# Patient Record
Sex: MALE | Race: WHITE | NOT HISPANIC OR LATINO | Employment: OTHER | URBAN - METROPOLITAN AREA
[De-identification: names, ages, dates, MRNs, and addresses within clinical notes are randomized per-mention and may not be internally consistent; named-entity substitution may affect disease eponyms.]

---

## 2021-10-16 ENCOUNTER — OFFICE VISIT (OUTPATIENT)
Dept: URGENT CARE | Facility: URGENT CARE | Age: 31
End: 2021-10-16
Payer: COMMERCIAL

## 2021-10-16 VITALS
TEMPERATURE: 97.9 F | WEIGHT: 315 LBS | OXYGEN SATURATION: 96 % | DIASTOLIC BLOOD PRESSURE: 94 MMHG | RESPIRATION RATE: 20 BRPM | SYSTOLIC BLOOD PRESSURE: 154 MMHG | HEART RATE: 84 BPM

## 2021-10-16 DIAGNOSIS — L02.211 ABDOMINAL WALL ABSCESS: ICD-10-CM

## 2021-10-16 DIAGNOSIS — L03.311 ABDOMINAL WALL CELLULITIS: Primary | ICD-10-CM

## 2021-10-16 PROCEDURE — 99203 OFFICE O/P NEW LOW 30 MIN: CPT | Performed by: NURSE PRACTITIONER

## 2021-10-16 RX ORDER — HYDROXYZINE HYDROCHLORIDE 25 MG/1
TABLET, FILM COATED ORAL
COMMUNITY
Start: 2019-12-04 | End: 2023-07-20

## 2021-10-16 RX ORDER — GABAPENTIN 300 MG/1
600 CAPSULE ORAL
COMMUNITY
Start: 2021-08-18 | End: 2023-07-13

## 2021-10-16 RX ORDER — SULFAMETHOXAZOLE/TRIMETHOPRIM 800-160 MG
1 TABLET ORAL 2 TIMES DAILY
Qty: 14 TABLET | Refills: 0 | Status: SHIPPED | OUTPATIENT
Start: 2021-10-16 | End: 2021-10-23

## 2021-10-16 NOTE — PROGRESS NOTES
Chief Complaint   Patient presents with     Urgent Care     cyst on his abdominal          ICD-10-CM    1. Abdominal wall cellulitis  L03.311 sulfamethoxazole-trimethoprim (BACTRIM DS) 800-160 MG tablet   2. Abdominal wall abscess  L02.211    Warm wet compresses, antibiotics as prescribed.  Not able to drain at this point but if symptoms worsen patient will return for possible drainage in the future.    Subjective     Troy Mccoy is an 31 year old male who presents to clinic today for abdominal cysts.  He has a history of developing cysts and abscesses that need to be drained.  The 2 he has now are on his waistline and lower abdomen, no drainage, present for about a week.    ROS: 10 point ROS neg other than the symptoms noted above in the HPI.       Objective    BP (!) 154/94   Pulse 84   Temp 97.9  F (36.6  C)   Resp 20   Wt (!) 152.9 kg (337 lb)   SpO2 96%     Physical Exam       GENERAL APPEARANCE: healthy appearing, alert     ABDOMEN:  soft, nontender, no HSM or masses and bowel sounds normal     MS: extremities normal- no gross deformities noted; normal muscle tone.     SKIN: 1.5 cm diameter area of erythema at the bottom of center abdominal wall with another larger area of erythema 3 x 3 cm underneath the first, no flocculence palpated area is hot to the touch    Patient Instructions   MUST START MEDICATION TODAY!!      Patient Education     Cellulitis  Cellulitis is an infection of the deep layers of skin. A break in the skin, such as a cut or scratch, can let bacteria under the skin. If the bacteria get to deep layers of the skin, it can be serious. If not treated, cellulitis can get into the bloodstream and lymph nodes. The infection can then spread throughout the body. This causes serious illness.   Cellulitis causes the affected skin to become red, swollen, warm, and sore. The reddened areas have a visible border. An open sore may leak fluid (pus). You may have a fever, chills, and pain.    Cellulitis is treated with antibiotics taken for 7 to 10 days. An open sore may be cleaned and covered with cool wet gauze. Symptoms should get better 1 to 2 days after treatment is started. Make sure to take all the antibiotics for the full number of days until they are gone. Keep taking the medicine even if your symptoms go away.   Home care  Follow these tips:    Limit the use of the part of your body with cellulitis.     If the infection is on your leg, keep your leg raised while sitting. This helps reduce swelling.    Take all of the antibiotic medicine exactly as directed until it is gone. Don't miss any doses, especially during the first 7 days. Don t stop taking the medicine when your symptoms get better.    Keep the affected area clean and dry.    Wash your hands with soap and clean, running water before and after touching your skin. Anyone else who touches your skin should also wash his or her hands. Don't share towels.  Follow-up care  Follow up with your healthcare provider, or as advised. If your infection doesn't go away on the first antibiotic, your healthcare provider will prescribe a different one.   When to seek medical advice  Call your healthcare provider right away if any of these occur:    Red areas that spread    Swelling or pain that gets worse    Fluid leaking from the skin (pus)    Fever higher of 100.4  F (38.0  C) or higher after 2 days on antibiotics  [employer] last reviewed this educational content on 8/1/2019 2000-2021 [employer]. All rights reserved. This information is not intended as a substitute for professional medical care. Always follow your healthcare professional's instructions.               ARMANDO Boggs, CNP  [facility] Provider

## 2021-10-16 NOTE — PATIENT INSTRUCTIONS
MUST START MEDICATION TODAY!!      Patient Education     Cellulitis  Cellulitis is an infection of the deep layers of skin. A break in the skin, such as a cut or scratch, can let bacteria under the skin. If the bacteria get to deep layers of the skin, it can be serious. If not treated, cellulitis can get into the bloodstream and lymph nodes. The infection can then spread throughout the body. This causes serious illness.   Cellulitis causes the affected skin to become red, swollen, warm, and sore. The reddened areas have a visible border. An open sore may leak fluid (pus). You may have a fever, chills, and pain.   Cellulitis is treated with antibiotics taken for 7 to 10 days. An open sore may be cleaned and covered with cool wet gauze. Symptoms should get better 1 to 2 days after treatment is started. Make sure to take all the antibiotics for the full number of days until they are gone. Keep taking the medicine even if your symptoms go away.   Home care  Follow these tips:    Limit the use of the part of your body with cellulitis.     If the infection is on your leg, keep your leg raised while sitting. This helps reduce swelling.    Take all of the antibiotic medicine exactly as directed until it is gone. Don't miss any doses, especially during the first 7 days. Don t stop taking the medicine when your symptoms get better.    Keep the affected area clean and dry.    Wash your hands with soap and clean, running water before and after touching your skin. Anyone else who touches your skin should also wash his or her hands. Don't share towels.  Follow-up care  Follow up with your healthcare provider, or as advised. If your infection doesn't go away on the first antibiotic, your healthcare provider will prescribe a different one.   When to seek medical advice  Call your healthcare provider right away if any of these occur:    Red areas that spread    Swelling or pain that gets worse    Fluid leaking from the skin  (pus)    Fever higher of 100.4  F (38.0  C) or higher after 2 days on antibiotics  [employer] last reviewed this educational content on 8/1/2019 2000-2021 [employer]. All rights reserved. This information is not intended as a substitute for professional medical care. Always follow your healthcare professional's instructions.

## 2022-12-22 ENCOUNTER — HOSPITAL ENCOUNTER (EMERGENCY)
Facility: CLINIC | Age: 32
Discharge: HOME | End: 2022-12-22
Attending: EMERGENCY MEDICINE | Admitting: EMERGENCY MEDICINE
Payer: COMMERCIAL

## 2022-12-22 ENCOUNTER — APPOINTMENT (OUTPATIENT)
Dept: CT IMAGING | Facility: CLINIC | Age: 32
End: 2022-12-22
Attending: EMERGENCY MEDICINE
Payer: COMMERCIAL

## 2022-12-22 VITALS
DIASTOLIC BLOOD PRESSURE: 94 MMHG | WEIGHT: 315 LBS | HEART RATE: 83 BPM | HEIGHT: 75 IN | OXYGEN SATURATION: 99 % | BODY MASS INDEX: 39.17 KG/M2 | SYSTOLIC BLOOD PRESSURE: 147 MMHG | RESPIRATION RATE: 18 BRPM | TEMPERATURE: 98.7 F

## 2022-12-22 DIAGNOSIS — Z87.19 HISTORY OF CIRRHOSIS: ICD-10-CM

## 2022-12-22 DIAGNOSIS — R10.31 ABDOMINAL PAIN, RIGHT LOWER QUADRANT: ICD-10-CM

## 2022-12-22 DIAGNOSIS — R19.7 VOMITING AND DIARRHEA: ICD-10-CM

## 2022-12-22 DIAGNOSIS — R11.10 VOMITING AND DIARRHEA: ICD-10-CM

## 2022-12-22 LAB
ALBUMIN SERPL-MCNC: 4.5 G/DL (ref 3.4–5)
ALP SERPL-CCNC: 98 U/L (ref 40–150)
ALT SERPL W P-5'-P-CCNC: 34 U/L (ref 0–70)
ANION GAP SERPL CALCULATED.3IONS-SCNC: 11 MMOL/L (ref 3–14)
AST SERPL W P-5'-P-CCNC: 23 U/L (ref 0–45)
BASOPHILS # BLD AUTO: 0 10E3/UL (ref 0–0.2)
BASOPHILS NFR BLD AUTO: 0 %
BILIRUB SERPL-MCNC: 0.9 MG/DL (ref 0.2–1.3)
BUN SERPL-MCNC: 17 MG/DL (ref 7–30)
CALCIUM SERPL-MCNC: 9.9 MG/DL (ref 8.5–10.1)
CHLORIDE BLD-SCNC: 102 MMOL/L (ref 94–109)
CO2 SERPL-SCNC: 23 MMOL/L (ref 20–32)
CREAT SERPL-MCNC: 0.93 MG/DL (ref 0.66–1.25)
EOSINOPHIL # BLD AUTO: 0 10E3/UL (ref 0–0.7)
EOSINOPHIL NFR BLD AUTO: 0 %
ERYTHROCYTE [DISTWIDTH] IN BLOOD BY AUTOMATED COUNT: 13.2 % (ref 10–15)
GFR SERPL CREATININE-BSD FRML MDRD: >90 ML/MIN/1.73M2
GLUCOSE BLD-MCNC: 96 MG/DL (ref 70–99)
HCT VFR BLD AUTO: 50.3 % (ref 40–53)
HGB BLD-MCNC: 17.8 G/DL (ref 13.3–17.7)
HOLD SPECIMEN: NORMAL
IMM GRANULOCYTES # BLD: 0.1 10E3/UL
IMM GRANULOCYTES NFR BLD: 1 %
LIPASE SERPL-CCNC: 124 U/L (ref 73–393)
LYMPHOCYTES # BLD AUTO: 3.1 10E3/UL (ref 0.8–5.3)
LYMPHOCYTES NFR BLD AUTO: 23 %
MCH RBC QN AUTO: 30.5 PG (ref 26.5–33)
MCHC RBC AUTO-ENTMCNC: 35.4 G/DL (ref 31.5–36.5)
MCV RBC AUTO: 86 FL (ref 78–100)
MONOCYTES # BLD AUTO: 0.6 10E3/UL (ref 0–1.3)
MONOCYTES NFR BLD AUTO: 4 %
NEUTROPHILS # BLD AUTO: 9.6 10E3/UL (ref 1.6–8.3)
NEUTROPHILS NFR BLD AUTO: 72 %
NRBC # BLD AUTO: 0 10E3/UL
NRBC BLD AUTO-RTO: 0 /100
PLATELET # BLD AUTO: 193 10E3/UL (ref 150–450)
POTASSIUM BLD-SCNC: 4 MMOL/L (ref 3.4–5.3)
PROT SERPL-MCNC: 10.2 G/DL (ref 6.8–8.8)
RBC # BLD AUTO: 5.84 10E6/UL (ref 4.4–5.9)
SODIUM SERPL-SCNC: 136 MMOL/L (ref 133–144)
WBC # BLD AUTO: 13.5 10E3/UL (ref 4–11)

## 2022-12-22 PROCEDURE — 250N000011 HC RX IP 250 OP 636: Performed by: EMERGENCY MEDICINE

## 2022-12-22 PROCEDURE — 80053 COMPREHEN METABOLIC PANEL: CPT | Performed by: EMERGENCY MEDICINE

## 2022-12-22 PROCEDURE — 250N000009 HC RX 250: Performed by: EMERGENCY MEDICINE

## 2022-12-22 PROCEDURE — 258N000003 HC RX IP 258 OP 636: Performed by: EMERGENCY MEDICINE

## 2022-12-22 PROCEDURE — 99285 EMERGENCY DEPT VISIT HI MDM: CPT | Mod: 25

## 2022-12-22 PROCEDURE — 36415 COLL VENOUS BLD VENIPUNCTURE: CPT | Performed by: EMERGENCY MEDICINE

## 2022-12-22 PROCEDURE — 83690 ASSAY OF LIPASE: CPT | Performed by: EMERGENCY MEDICINE

## 2022-12-22 PROCEDURE — 85014 HEMATOCRIT: CPT | Performed by: EMERGENCY MEDICINE

## 2022-12-22 PROCEDURE — 74177 CT ABD & PELVIS W/CONTRAST: CPT

## 2022-12-22 PROCEDURE — 96374 THER/PROPH/DIAG INJ IV PUSH: CPT | Mod: 59

## 2022-12-22 PROCEDURE — 96361 HYDRATE IV INFUSION ADD-ON: CPT

## 2022-12-22 PROCEDURE — 85025 COMPLETE CBC W/AUTO DIFF WBC: CPT | Performed by: EMERGENCY MEDICINE

## 2022-12-22 RX ORDER — ONDANSETRON 2 MG/ML
4 INJECTION INTRAMUSCULAR; INTRAVENOUS
Status: DISCONTINUED | OUTPATIENT
Start: 2022-12-22 | End: 2022-12-23 | Stop reason: HOSPADM

## 2022-12-22 RX ORDER — IOPAMIDOL 755 MG/ML
135 INJECTION, SOLUTION INTRAVASCULAR ONCE
Status: COMPLETED | OUTPATIENT
Start: 2022-12-22 | End: 2022-12-22

## 2022-12-22 RX ORDER — QUETIAPINE FUMARATE 100 MG/1
100 TABLET, FILM COATED ORAL 2 TIMES DAILY
COMMUNITY
End: 2023-07-20

## 2022-12-22 RX ADMIN — ONDANSETRON 4 MG: 2 INJECTION INTRAMUSCULAR; INTRAVENOUS at 21:36

## 2022-12-22 RX ADMIN — IOPAMIDOL 135 ML: 755 INJECTION, SOLUTION INTRAVENOUS at 21:14

## 2022-12-22 RX ADMIN — SODIUM CHLORIDE 79 ML: 9 INJECTION, SOLUTION INTRAVENOUS at 21:15

## 2022-12-22 RX ADMIN — SODIUM CHLORIDE 1000 ML: 9 INJECTION, SOLUTION INTRAVENOUS at 21:35

## 2022-12-22 ASSESSMENT — ACTIVITIES OF DAILY LIVING (ADL): ADLS_ACUITY_SCORE: 33

## 2022-12-23 NOTE — ED TRIAGE NOTES
States R lower abd pain with N/V and fatigue. States has hx of liver problems     Triage Assessment     Row Name 12/22/22 1928       Triage Assessment (Adult)    Airway WDL WDL       Respiratory WDL    Respiratory WDL WDL       Skin Circulation/Temperature WDL    Skin Circulation/Temperature WDL WDL       Cardiac WDL    Cardiac WDL WDL       Peripheral/Neurovascular WDL    Peripheral Neurovascular WDL WDL       Cognitive/Neuro/Behavioral WDL    Cognitive/Neuro/Behavioral WDL WDL

## 2022-12-23 NOTE — ED PROVIDER NOTES
"  History   Chief Complaint:  Abdominal Pain     The history is provided by the patient, medical records and a parent.      Troy Mccoy is a 32 year old male with history of polysubstance abuse, alcoholic cirrhosis, and Hepatitis C who presents with nausea and non-bloody diarrhea and vomiting, all worsening over the past few weeks. The patient has history of hepatitis (recently started on Mavyret) and alcoholic cirrhosis and states that over the past 3 weeks, \"I can feel my liver distending more than normal.\" Over this time, he also reports worsening right-sided lower abdominal pain from his baseline (has chronic RLQ abdominal pain \"for years\") with accompanying nausea, diarrhea (10x yesterday, 4x today) and \"retching.\" He has been treating at home with Tylenol - has taken 1000 mg twice today. Today, his mother grew particularly concerned over his worsening diarrhea and increasing fatigue, ultimately prompting their presentation to ED today for his evaluation.  The patient denies measured fever along with any urinary symptoms. He denies past abdominal surgeries. The patient endorses THC use and has been sober from alcohol for 90 days. He denies recent antibiotic use, travel, or exposure to new/contaminated food.    Review of Systems   All other systems reviewed and are negative.     Allergies:  Cephalosporins  Cefaclor    Medications:  Quetiapine  Gabapentin  Hydroxyzine  Thiamine  Pantoprazole  Nicotine    Past Medical History:     Opioid use disorder  Psychoactive substance-induced psychosis  Alcoholic cirrhosis  Methamphetamine use disorder, severe  Depression  Atrial fibrillation  Hepatitis C virus  Neuropathy  Anxiety  Upper GI bleed  Polysubstance overdose  Alcoholic hepatitis  Retinal hemorrhage  Leukocytosis  Thrombocytopenia  Normocytic anemia  Alcohol withdrawal syndrome    Past Surgical History:    [place] teeth extraction  EGD, combined  Cyst removal    Social History:  The patient presents to the ED " "with his mother.  The patient arrived in a private vehicle.  PCP: [facility]     Physical Exam     Patient Vitals for the past 24 hrs:   BP Temp Temp src Pulse Resp SpO2 Height Weight   12/22/22 2230 (!) 147/94 -- -- 83 -- -- -- --   12/22/22 1931 136/87 98.7  F (37.1  C) Oral 97 18 99 % -- --   12/22/22 1927 -- -- -- -- -- -- 1.905 m (6' 3\") 142.9 kg (315 lb)     Physical Exam  General: Nontoxic-appearing male recumbent in room 21, mother at bedside  HENT: mucous membranes slightly dry, OP clear  CV: rate as above, no murmur audible  Resp: normal effort, speaks in full phrases, no stridor, no cough observed  GI: Abdomen soft, nondistended, no palpable fluid wave, minimal tenderness in right lower quadrant without masses or bulging appreciable, no distention, negative Caballero sign  MSK: no bony tenderness, no CVAT  Skin: appropriately warm and dry, no jaundice  Neuro: alert, clear speech, oriented, clear speech, no tremors, no asterixis  Psych: cooperative, no apparent hallucinations    Emergency Department Course     Imaging:  CT Abdomen Pelvis w Contrast   Final Result   IMPRESSION:    1.  Descending and sigmoid colon are nondistended and may account for wall thickening. Colitis possible, however there are no additional inflammatory changes. No evidence for obstruction.   2.  Normal appendix.   3.  Cirrhosis.   4.  No other findings to account for the patient's symptoms.        Report per radiology    Laboratory:  Labs Ordered and Resulted from Time of ED Arrival to Time of ED Departure   COMPREHENSIVE METABOLIC PANEL - Abnormal       Result Value    Sodium 136      Potassium 4.0      Chloride 102      Carbon Dioxide (CO2) 23      Anion Gap 11      Urea Nitrogen 17      Creatinine 0.93      Calcium 9.9      Glucose 96      Alkaline Phosphatase 98      AST 23      ALT 34      Protein Total 10.2 (*)     Albumin 4.5      Bilirubin Total 0.9      GFR Estimate >90     CBC WITH PLATELETS AND " DIFFERENTIAL - Abnormal    WBC Count 13.5 (*)     RBC Count 5.84      Hemoglobin 17.8 (*)     Hematocrit 50.3      MCV 86      MCH 30.5      MCHC 35.4      RDW 13.2      Platelet Count 193      % Neutrophils 72      % Lymphocytes 23      % Monocytes 4      % Eosinophils 0      % Basophils 0      % Immature Granulocytes 1      NRBCs per 100 WBC 0      Absolute Neutrophils 9.6 (*)     Absolute Lymphocytes 3.1      Absolute Monocytes 0.6      Absolute Eosinophils 0.0      Absolute Basophils 0.0      Absolute Immature Granulocytes 0.1      Absolute NRBCs 0.0     LIPASE - Normal    Lipase 124     ROUTINE UA WITH MICROSCOPIC   ENTERIC BACTERIA AND VIRUS PANEL BY DRISS STOOL   C. DIFFICILE TOXIN B PCR WITH REFLEX TO C. DIFFICILE ANTIGEN AND TOXINS A/B EIA      Emergency Department Course:      Reviewed:  I reviewed nursing notes, vitals, past medical history and Care Everywhere.    Assessments:  2050 I obtained history and examined the patient as noted above.   2159 I rechecked the patient and explained findings. I believe that they are safe for discharge at this time.     Interventions:  2135 NS 1 L IV  2136 Zofran 4 mg IV    Disposition:  The patient was discharged to home.     Impression & Plan     Medical Decision Making:  Differential diagnosis includes gastroenteritis, colitis, diverticulitis, appendicitis, inflammatory bowel disease, and many others.  He has no peritonitis, and reports some longstanding abdominal pain, but given concern for possible recent worsening in the setting of known liver disease, work-up was pursued as above.  CT does not show any immediately surgical condition nor clear diagnosis for his symptoms, noting possible colitis for which I do not think that antibiotics are indicated.  He expressed concern for his liver though his liver labs actually look quite good at this time.  No evidence of decompensated cirrhosis.  He is tolerating oral intake here and agreed with the plan for subsequent  discharge home in improved condition.  He should return here for worsening at any hour, maintain abstinence from alcohol, and follow-up closely through his clinic for recheck.    Diagnosis:    ICD-10-CM    1. Abdominal pain, right lower quadrant  R10.31       2. Vomiting and diarrhea  R11.10     R19.7       3. History of cirrhosis  Z87.19         Scribe Disclosure:  I, Larissa Dean, am serving as a scribe at 8:46 PM on 12/22/2022 to document services personally performed by Malik Lucia MD based on my observations and the provider's statements to me.     Malik Lucia MD  12/23/22 0035

## 2023-07-13 ENCOUNTER — OFFICE VISIT (OUTPATIENT)
Dept: BEHAVIORAL HEALTH | Facility: CLINIC | Age: 33
End: 2023-07-13
Payer: COMMERCIAL

## 2023-07-13 VITALS — SYSTOLIC BLOOD PRESSURE: 142 MMHG | HEART RATE: 111 BPM | DIASTOLIC BLOOD PRESSURE: 91 MMHG

## 2023-07-13 DIAGNOSIS — F11.90 OPIOID USE DISORDER: Primary | ICD-10-CM

## 2023-07-13 DIAGNOSIS — F19.90 IVDU (INTRAVENOUS DRUG USER): ICD-10-CM

## 2023-07-13 DIAGNOSIS — Z86.19 HISTORY OF HEPATITIS C: ICD-10-CM

## 2023-07-13 DIAGNOSIS — F11.20 OPIOID USE DISORDER, SEVERE, DEPENDENCE (H): Primary | ICD-10-CM

## 2023-07-13 DIAGNOSIS — F15.21 METHAMPHETAMINE USE DISORDER, SEVERE, IN SUSTAINED REMISSION (H): ICD-10-CM

## 2023-07-13 DIAGNOSIS — F10.20 ALCOHOL USE DISORDER, SEVERE, DEPENDENCE (H): ICD-10-CM

## 2023-07-13 DIAGNOSIS — F11.93 OPIATE WITHDRAWAL (H): ICD-10-CM

## 2023-07-13 LAB
AMPHETAMINE QUAL URINE POCT: NEGATIVE
BARBITURATE QUAL URINE POCT: NEGATIVE
BENZODIAZEPINE QUAL URINE POCT: NEGATIVE
BUPRENORPHINE QUAL URINE POCT: NEGATIVE
COCAINE QUAL URINE POCT: NEGATIVE
CREATININE QUAL URINE POCT: ABNORMAL
FENTANYL UR QL: ABNORMAL
INTERNAL QC QUAL URINE POCT: ABNORMAL
MDMA QUAL URINE POCT: NEGATIVE
METHADONE QUAL URINE POCT: NEGATIVE
METHAMPHETAMINE QUAL URINE POCT: NEGATIVE
OPIATE QUAL URINE POCT: NEGATIVE
OXYCODONE QUAL URINE POCT: NEGATIVE
PH QUAL URINE POCT: ABNORMAL
PHENCYCLIDINE QUAL URINE POCT: NEGATIVE
POCT KIT EXPIRATION DATE: ABNORMAL
POCT KIT LOT NUMBER: ABNORMAL
SPECIFIC GRAVITY POCT: 1.02
TEMPERATURE URINE POCT: ABNORMAL
THC QUAL URINE POCT: ABNORMAL

## 2023-07-13 PROCEDURE — 99207 FENTANYL, QUALITATIVE, WITH REFLEX TO QUANT URINE: CPT | Performed by: NURSE PRACTITIONER

## 2023-07-13 PROCEDURE — 99207 DRUGS OF ABUSE SCREEN URINE (POC CUPS) POCT: CPT | Performed by: NURSE PRACTITIONER

## 2023-07-13 PROCEDURE — G0480 DRUG TEST DEF 1-7 CLASSES: HCPCS | Mod: 90 | Performed by: NURSE PRACTITIONER

## 2023-07-13 PROCEDURE — 99204 OFFICE O/P NEW MOD 45 MIN: CPT | Performed by: NURSE PRACTITIONER

## 2023-07-13 PROCEDURE — H0038 SELF-HELP/PEER SVC PER 15MIN: HCPCS

## 2023-07-13 RX ORDER — GABAPENTIN 300 MG/1
900 CAPSULE ORAL 3 TIMES DAILY
Qty: 270 CAPSULE | Refills: 0 | Status: SHIPPED | OUTPATIENT
Start: 2023-07-13 | End: 2023-09-12

## 2023-07-13 RX ORDER — CLONIDINE HYDROCHLORIDE 0.1 MG/1
0.1 TABLET ORAL 3 TIMES DAILY PRN
Qty: 15 TABLET | Refills: 0 | Status: SHIPPED | OUTPATIENT
Start: 2023-07-13 | End: 2023-07-20

## 2023-07-13 RX ORDER — BUPRENORPHINE AND NALOXONE 8; 2 MG/1; MG/1
1 FILM, SOLUBLE BUCCAL; SUBLINGUAL 2 TIMES DAILY
Qty: 18 FILM | Refills: 0 | Status: SHIPPED | OUTPATIENT
Start: 2023-07-13 | End: 2023-07-20

## 2023-07-13 ASSESSMENT — PATIENT HEALTH QUESTIONNAIRE - PHQ9: SUM OF ALL RESPONSES TO PHQ QUESTIONS 1-9: 22

## 2023-07-13 NOTE — PROGRESS NOTES
[facility] Initial Visit    ASSESSMENT/PLAN                                                        1. Opioid use disorder, severe, dependence (H)  32 year old male with 1 year h/o of IV fentanyl/heroin, 0.5-1 gram daily, last use 7/2/23. He entered [facility] yesterday and is interested in starting suboxone.   -Plan to titrate suboxone to 16 mg TDD. Providing extra films to take an additional 4 mg if needed.   -It has been >7 days since his last use, okay to start suboxone.   -Confirms access to narcan at treatment, providing narcan today as well to have when he his discharged.   -Continue programming at [facility]. Encouraged plans for IOP with sober living at discharge.   -Attendance at support groups encouraged as able.   - Drugs of Abuse Screen Urine (POC CUPS) POCT; Standing  - Fentanyl Qualitative with Reflex to Quant Urine; Future  - Drugs of Abuse Screen Urine (POC CUPS) POCT  - Fentanyl Qualitative with Reflex to Quant Urine  - buprenorphine HCl-naloxone HCl (SUBOXONE) 8-2 MG per film; Place 1 Film under the tongue 2 times daily  Dispense: 18 Film; Refill: 0  - naloxone (NARCAN) 4 MG/0.1ML nasal spray; Spray 1 spray (4 mg) into one nostril alternating nostrils as needed every 2-3 minutes until assistance arrives  Dispense: 0.2 mL; Refill: 11  - Fentanyl and Metabolite Quantitative, Urine    2. Opiate withdrawal (H)  Patient still experiencing withdrawal symptoms. Providing clonidine today.    Is prescribed gabapentin 800 mg TID by his PCP, last received 7/3. States he lost his prescription.  Providing refill today and increasing to 900 mg TID to help with anxiety and withdrawal symptoms.   -Recommend that he discuss future refills with his PCP.   - gabapentin (NEURONTIN) 300 MG capsule; Take 3 capsules (900 mg) by mouth 3 times daily  Dispense: 270 capsule; Refill: 0  - cloNIDine (CATAPRES) 0.1 MG tablet; Take 1 tablet (0.1 mg) by mouth 3 times daily as needed (opiate  withdrawal (restlessness, anxiety, sweats))  Dispense: 15 tablet; Refill: 0    3. Alcohol use disorder, severe, dependence (H)  History of drinking 1 gallon of vodka daily until 2018. Currently drinking a few drinks a few times monthly. Reports occasional cravings.   -Gabapentin may provide some benefit.   -Consider adding acamprosate if cravings persist.   -Recommend rechecking hepatic profile at follow up. Encouraged him to complete prior to his follow up appointment   - Hepatic panel (Albumin, ALT, AST, Bili, Alk Phos, TP); Future    4. Methamphetamine use disorder, severe, in sustained remission (H)  Hx of daily IV meth use, in remission since 2018. Monitor for cravings.     5. History of hepatitis C  Received treatment with [facility] 2022, but has not had follow up lab work.    -Recommend rechecking Hep C RNA, encouraged to complete at follow up.    - Hepatitis C RNA, quantitative; Future  - Hepatic panel (Albumin, ALT, AST, Bili, Alk Phos, TP); Future    6. IVDU (intravenous drug user)  Recommend rechecking HIV at follow up.            Return in about 1 week (around 7/20/2023) for Follow up, in person.    Patient counseling completed today:  Discussed mechanism of action, potential risks/benefits/side effects of medications and other recommendations above.    Discussed risk of precipitated withdrawal with initiation of buprenorphine in the presence of full opioid agonists.    Reviewed directions for initiation of buprenorphine to reduce risk of precipitated withdrawal and maximize efficacy.    Harm reduction counseling including never use alone, availability of naloxone, avoiding combination of opioids with benzodiazepines, alcohol, or other sedatives, safer administration.      Discussed importance of avoiding isolation, building a network of supportive relationships, avoiding people/places/things associated with past use to reduce risk of relapse; including motivational interviewing regarding psychosocial  "treatment for addiction.     SUBJECTIVE                                                      CC/HPI:  Troy Mccoy is a 32 year old male with PMH  HX of hepatitis C, anxiety, severe alcohol use disorder, methamphetamine use disorder, in remission, and opioid use disorder who presents to the Recovery Clinic for initial visit.      Brief History:  Troy Mccoy was first seen in Recovery Clinic on 23. They were referred by [facility]   Patient's reasons for seeking treatment on this date include starting suboxone. He has been using heroin/fentnayl 0.5-1 gram daily, last use was 23.      He has history of daily IV meth use, last use , and history of drinking 1 gallon of vodka daily until 2018 when he was hospitalized and in ICU for seizures. He maintained 1 year of abstinence, and returned to alcohol use. He began using fentanyl/heroin IV 1 year ago,  and decreased his alcohol use to \"few drinks\", couple times per month. He last used He entered residential treatment at [facility] yesterday and wants to start suboxone.       Substance Use History :  Opioids:   Age at first use: [age] years. For past year 0.5-1 gram daily. Using IV  Current use: substance: fentanayl, Heroin; quantity NA; route: fent inhalation, heroin, IV ; timing of last use: approx week of  ;      IV drug use: Yes: heroin   History of overdose: Yes: 12 times  Previous residential or outpatient treatments for addiction : Yes: [facility] 23  Previous medication treatments for addiction: No  Longest period of sobriety: 1 year,   Medical complications related to substance use: no  Hepatitis C: pt report positive in , but has been cleared since ; Date of most recent testin, havent been sharing any needles  HIV: pt report neg; Date of most recent testin    Taking buprenorphine? No  Narcan currently available: Yes    DSM-5 OUD criteria met:  Taken in larger amounts/greater time spent in " behavior over longer period of time than intended,Yes: opioids   Persistent desire or unsuccessful efforts to cut down or control use/behavior, Yes: opioids   A great deal of time is spent in activities necessary to obtain the substance/participate in the behavior or recover from its effects, Yes:    Cravings, Yes:    Recurrent use/behavior resulting in failure to fulfill major role obligations at work, school, or home, Yes:    Continued use/behavior despite having persistent or recurrent social or interpersonal problems caused or exacerbated by effects of use/behavior, Yes:    Important social, occupational, or recreational activities are given up or reduced because of use/behavior, Yes:    Recurrent use/behavior in situations in which it is physically hazardous, Yes:    Continued use/behavior despite knowledge of having a persistent or recurrent physical or psychological problem that is likely to have been caused or exacerbated by use/behavior, Yes:    Tolerance, Yes:     Withdrawal, Yes:      Other Addiction History:  Stimulants (cocaine, methamphetamine, MDMA/ecstasy)   Meth IV, hx of faily before 2018. Last use was in 2018  Sedatives/hypnotics/anxiolytics: (benzodiazepines, GHB, Ambien, phenobarbital)  BDZ sometimes, 2018  Alcohol:   Yes, HX of drinking 1 gallon a day of vodka until 2018. Since then drinking few drinks couple times monthly.   Nicotine: (cigarettes, vaping, chew/snuff)  vaping  Cannabis:   Yes, smokes couple of hits here and there.   Hallucinogens/Dissociatives: (acid, mushrooms, ketamine)  Yes, none recently  Eating disorder:  no  Gambling:   no        [place] Prescription Drug Monitoring Program Reviewed:  Yes    07/03/2023 07/03/2023   2  Gabapentin 800 Mg Tablet 90.00  30  Francia Snyder  [id number]   Sup ([id number])  0/2   Medicaid  [place]    05/26/2023 04/07/2023   2  Gabapentin 800 Mg Tablet 90.00  30  Francia Snyder  [id number]   Sup ([id number]             Past Medical History:   Diagnosis Date     Cirrhosis of liver  (H)          PAST PSYCHIATRIC HISTORY:  Diagnoses- anxiety  Suicide Attempts: Yes 2 years ago approx 2021  Hospitalizations: Yes 2 nyears ago         7/13/2023     1:00 PM   PHQ   PHQ-9 Total Score 22   Q9: Thoughts of better off dead/self-harm past 2 weeks Not at all         If PHQ-9 score of 15 or higher, has [facility] therapist or provider been notified? Yes    Any current suicidal ideation? No  If yes, has [facility] therapist or provider been notified? N/A    Mental health provider: none(follow up on  referral if needed)    No past surgical history on file.    Medications:  hydrOXYzine (ATARAX) 25 MG tablet, TAKE 1 TABLET BY MOUTH THREE TIMES A DAY AS NEEDED FOR ANXIETY. (Patient not taking: Reported on 7/13/2023)  QUEtiapine (SEROQUEL) 100 MG tablet, Take 100 mg by mouth 2 times daily (Patient not taking: Reported on 7/13/2023)    No current facility-administered medications on file prior to visit.      Allergies   Allergen Reactions     Cephalosporins Hives     Cefaclor Rash       No family history on file.      Social History  Housing status: [facility]  Employment status: Unemployed, not seeking work  Relationship status: Single  Children: no children  Legal: none  Insurance needs: active  Contact information up to date? yes    3rd Party Involvement no tttoday (please obtain LILIAN if pt would like to include)    REVIEW OF SYSTEMS:  General: Withdrawal symptoms as described below.  No recent fevers.   Eyes:  No vision concerns.  No jaundice.    Resp: No coughing, wheezing or shortness of breath  CV: No chest pains or palpitations  GI: No complaints other than as above  : No urinary frequency or dysuria, no discharge  Musculoskeletal: No significant muscle or joint pains other than as above.  No edema  Neurologic: No numbness, tingling, weakness, problems with balance or coordination  Psychiatric: No acute concerns other than as above.   Skin: No rashes or areas of acute  infection    OBJECTIVE                                                        Clinical Opioid Withdrawal Scale (COWS)    Resting Pulse Rate  2  =  101-120   Sweating    (over past 1/2 hour) 1  =  subjective report of chills or flushing   Restlessness  1  =  reports difficulty sitting still, but is able to do so   Pupil size  0  =  pupils pinned or normal size for room light   Bone or Joint Aches    (acute only) 0  =  not present   Runny nose or tearing    (unrelated to cold/allergies) 2  =  nose running or tearing   GI Upset    (over past 1/2 hour) 0  =  no GI symptoms   Tremor    (outstretched hands) 0  =  no tremor   Yawning    (during assessment) 0  =  no yawning   Anxiety/Irritability 2  =  patient obviously irritable or anxious   Gooseflesh skin 0  =  skin is smooth     TOTAL SCORE  Add column for score   8       BP (!) 142/91   Pulse 111     Physical Exam  HENT:      Head: Normocephalic.      Nose: Congestion and rhinorrhea present.   Eyes:      Conjunctiva/sclera: Conjunctivae normal.   Cardiovascular:      Rate and Rhythm: Tachycardia present.   Pulmonary:      Effort: Pulmonary effort is normal.   Neurological:      General: No focal deficit present.      Mental Status: He is alert and oriented to person, place, and time.   Psychiatric:         Attention and Perception: Attention normal.         Mood and Affect: Mood is anxious. Affect is blunt.         Speech: Speech normal.         Behavior: Behavior is cooperative.         Thought Content: Thought content does not include suicidal ideation.         Judgment: Judgment normal.      Comments: Mood is congruent with subject matter         Labs:    UDS:   Lab Results   Component Value Date    BUP Negative 07/13/2023    BZO Negative 07/13/2023    BAR Negative 07/13/2023    DELROY Negative 07/13/2023    MAMP Negative 07/13/2023    AMP Negative 07/13/2023    MDMA Negative 07/13/2023    MTD Negative 07/13/2023    [id number] Negative 07/13/2023    OXY Negative  07/13/2023    PCP Negative 07/13/2023    THC Screen Positive (A) 07/13/2023    TEMP 94 F 07/13/2023    SGPOCT 1.025 07/13/2023       *POC urine drug screen does not screen for Fentanyl    Recent Results (from the past 720 hour(s))   Drugs of Abuse Screen Urine (POC CUPS) POCT    Collection Time: 07/13/23  1:02 PM   Result Value Ref Range    POCT Kit Lot Number [id number]     POCT Kit Expiration Date 26645101     Temperature Urine POCT 94 F 90 F, 92 F, 94 F, 96 F, 98 F, 100 F    Specific [place] POCT 1.025 1.005, 1.015, 1.025    pH Qual Urine POCT 5 pH 4 pH, 5 pH, 7 pH, 9 pH    Creatinine Qual Urine POCT 200 mg/dL 20 mg/dL, 50 mg/dL, 100 mg/dL, 200 mg/dL    Internal QC Qual Urine POCT Valid Valid    Amphetamine Qual Urine POCT Negative Negative    Barbiturate Qual Urine POCT Negative Negative    Buprenorphine Qual Urine POCT Negative Negative    Benzodiazepine Qual Urine POCT Negative Negative    Cocaine Qual Urine POCT Negative Negative    Methamphetamine Qual Urine POCT Negative Negative    MDMA Qual Urine POCT Negative Negative    Methadone Qual Urine POCT Negative Negative    Opiate Qual Urine POCT Negative Negative    Oxycodone Qual Urine POCT Negative Negative    Phencyclidine Qual Urine POCT Negative Negative    THC Qual Urine POCT Screen Positive (A) Negative               At least 60 min spent in review of medical record,  review, obtaining histories, discussing recommendations, counseling, providing support.      [place] [address], [address]  [place], [place] [address] [phone]

## 2023-07-13 NOTE — PATIENT INSTRUCTIONS
Start suboxone 8 mg twice daily. May take an additional 4 mg daily as needed for cravings/ withdrawal.    May take clonidine for opiate withdrawal (restlessness, anxiety, sweats)    Increased gabapentin to 300 mg TID to allow insurance to cover refill.

## 2023-07-13 NOTE — PROGRESS NOTES
"[facility]    Peer  met with Troy Mccoy in the Recovery Clinic to introduce himself, detail services provided and discuss current status of recovery. Pt appeared alert, oriented and open to feedback during our discussion.   PRC sees patient today under provider diagnosis of opioid use disorder     Pt arrives for visit with provider for suboxone.  Pt reports has  an addiction problem a couple years and is at [facility]. Pt explained has been to many out patient treatments. Writer stated, Recovery is hard but its worth it. \" You just have to keep on fighting and when you fall get right back up.  Pt thanked writer for talking with him.          PRC provided business card to pt welcoming contact for recovery based support and resources. PRC and pt agree to speak again during an upcoming  visit.           Service Type:     Individual     Session Start Time:   2:00 pm                    Session End Time:    2:15 pm    Session Length:        15 mins      Patient Goal:   To utilize suboxone assisted treatment for sobriety and long term recovery.     Goal Progress:   Ongoing.    Key Risk Factors to Recovery:   PRC encourages being aware of risk factors that can lead to re-use which include avoiding isolation, avoiding triggers and managing cravings in a healthy manner. being open and willing to acceptance and change on a daily basis.  PRC encourages pt to establish a sober network calling tree to reach out to when needed.  Continue to practice honesty with ourselves and trusted support person(s).   PRC encourages regular attendance at recovery based meetings as well as finding a sponsor for mentoring and accountability.   PRC encourages consideration of other services such as counseling for mental health issues which can correlate with our substance use.      Support Needs:   Ongoing care, support and resources for opioid substance use disorder.     Follow up:   PRC has " provided pt with his contact information for support and resource needs.    PRC and pt agree to meet during an upcoming  visit.       [facility]  [address], [address]   [place], [place], [address]  Clinic Phone: [phone]  Clinic Fax: [phone]  Peer  phone: [phone]    Open Monday - Friday  9:00am-4:00pm  Walk in hours: 9am-3pm      Bernadette Zarco  July 13, 2023  2:55 PM    MADHAV Pettit provides clinical oversite and supervision of care.

## 2023-07-13 NOTE — PROGRESS NOTES
07/03/2023 07/03/2023   2  Gabapentin 800 Mg Tablet 90.00  30  Francia Snyder  [id number]   Sup ([id number])  0/2   Medicaid  [place]

## 2023-07-20 ENCOUNTER — OFFICE VISIT (OUTPATIENT)
Dept: BEHAVIORAL HEALTH | Facility: CLINIC | Age: 33
End: 2023-07-20
Payer: COMMERCIAL

## 2023-07-20 ENCOUNTER — LAB (OUTPATIENT)
Dept: LAB | Facility: CLINIC | Age: 33
End: 2023-07-20
Payer: COMMERCIAL

## 2023-07-20 ENCOUNTER — TELEPHONE (OUTPATIENT)
Dept: BEHAVIORAL HEALTH | Facility: CLINIC | Age: 33
End: 2023-07-20

## 2023-07-20 VITALS — HEART RATE: 87 BPM | DIASTOLIC BLOOD PRESSURE: 89 MMHG | SYSTOLIC BLOOD PRESSURE: 130 MMHG

## 2023-07-20 DIAGNOSIS — F10.20 ALCOHOL USE DISORDER, SEVERE, DEPENDENCE (H): ICD-10-CM

## 2023-07-20 DIAGNOSIS — F19.90 IVDU (INTRAVENOUS DRUG USER): ICD-10-CM

## 2023-07-20 DIAGNOSIS — F15.21 METHAMPHETAMINE USE DISORDER, SEVERE, IN SUSTAINED REMISSION (H): ICD-10-CM

## 2023-07-20 DIAGNOSIS — F11.20 OPIOID USE DISORDER, SEVERE, DEPENDENCE (H): Primary | ICD-10-CM

## 2023-07-20 DIAGNOSIS — Z86.19 HISTORY OF HEPATITIS C: ICD-10-CM

## 2023-07-20 LAB
ALBUMIN SERPL BCG-MCNC: 4.1 G/DL (ref 3.5–5.2)
ALP SERPL-CCNC: 59 U/L (ref 40–129)
ALT SERPL W P-5'-P-CCNC: 58 U/L (ref 0–70)
AMPHETAMINE QUAL URINE POCT: NEGATIVE
AST SERPL W P-5'-P-CCNC: 43 U/L (ref 0–45)
BARBITURATE QUAL URINE POCT: NEGATIVE
BENZODIAZEPINE QUAL URINE POCT: NEGATIVE
BILIRUB DIRECT SERPL-MCNC: <0.2 MG/DL (ref 0–0.3)
BILIRUB SERPL-MCNC: 0.4 MG/DL
BUPRENORPHINE QUAL URINE POCT: ABNORMAL
COCAINE QUAL URINE POCT: NEGATIVE
CREATININE QUAL URINE POCT: ABNORMAL
FENTANYL UR-MCNC: 1.6 NG/ML
HIV 1+2 AB+HIV1 P24 AG SERPL QL IA: NONREACTIVE
INTERNAL QC QUAL URINE POCT: ABNORMAL
MDMA QUAL URINE POCT: NEGATIVE
METHADONE QUAL URINE POCT: NEGATIVE
METHAMPHETAMINE QUAL URINE POCT: NEGATIVE
NORFENTANYL UR-MCNC: 4.5 NG/ML
OPIATE QUAL URINE POCT: NEGATIVE
OXYCODONE QUAL URINE POCT: NEGATIVE
PH QUAL URINE POCT: ABNORMAL
PHENCYCLIDINE QUAL URINE POCT: NEGATIVE
POCT KIT EXPIRATION DATE: ABNORMAL
POCT KIT LOT NUMBER: ABNORMAL
PROT SERPL-MCNC: 7.8 G/DL (ref 6.4–8.3)
SPECIFIC GRAVITY POCT: 1.02
TEMPERATURE URINE POCT: ABNORMAL
THC QUAL URINE POCT: NEGATIVE

## 2023-07-20 PROCEDURE — 36415 COLL VENOUS BLD VENIPUNCTURE: CPT

## 2023-07-20 PROCEDURE — 82248 BILIRUBIN DIRECT: CPT

## 2023-07-20 PROCEDURE — 87389 HIV-1 AG W/HIV-1&-2 AB AG IA: CPT

## 2023-07-20 PROCEDURE — 80305 DRUG TEST PRSMV DIR OPT OBS: CPT | Performed by: NURSE PRACTITIONER

## 2023-07-20 PROCEDURE — 99214 OFFICE O/P EST MOD 30 MIN: CPT | Performed by: NURSE PRACTITIONER

## 2023-07-20 PROCEDURE — 87522 HEPATITIS C REVRS TRNSCRPJ: CPT

## 2023-07-20 RX ORDER — BUPRENORPHINE AND NALOXONE 8; 2 MG/1; MG/1
1 FILM, SOLUBLE BUCCAL; SUBLINGUAL 3 TIMES DAILY
Qty: 18 FILM | Refills: 0 | COMMUNITY
Start: 2023-07-20 | End: 2023-09-12

## 2023-07-20 ASSESSMENT — PATIENT HEALTH QUESTIONNAIRE - PHQ9: SUM OF ALL RESPONSES TO PHQ QUESTIONS 1-9: 15

## 2023-07-20 NOTE — TELEPHONE ENCOUNTER
RN received the following directive from provider:    Lori Otero APRN CNP  P [facility] Rn Pool  Reviewed. Liver function panel without abnormalities. Pt requesting call with results.     ARMANDO Lopez CNP on 7/20/2023 at 4:05 PM     RN called patient and reviewed the above and patient conveyed good understanding. Patient is aware HIV and Hep C labs are still in process.     Heidy Vizcaino RN on 7/20/2023 at 4:26 PM

## 2023-07-20 NOTE — PROGRESS NOTES
[facility] Follow Up    ASSESSMENT/PLAN                                                      1. Opioid use disorder, severe, dependence (H)  - S/p home induction, was taking 8 mg BID and 4 mg prn for cravings. No full opioid agonist use since his last visit. Was seen by NP at [facility] today. Plans to continue following with this provider. Dose was increased to 8 mg TID. No prescription needed today.  Confirms access to narcan   - Continue programming at [facility]    - Drugs of Abuse Screen Urine (POC CUPS) POCT    2. Alcohol use disorder, severe, dependence (H)  - Denies recent use, intermittent cravings. Discussed pharmacotherapy for AUD. Pt declines at this time. Has follow up with NP at [facility].   - Continue programming at [facility]      3. Methamphetamine use disorder, severe, in sustained remission (H)  - Denies recent use or cravings. Sustained remission since 2018.   - Continue programming at [facility]      4. History of hepatitis C  Received treatment with [facility] 2022, needs HCV RNA recheck, lab orders placed on 7/13/23, encouraged pt to stop at lab.    - Hepatitis C RNA, quantitative; Future  - Hepatic panel (Albumin, ALT, AST, Bili, Alk Phos, TP); Future    5. IVDU (intravenous drug user)  - Recommended up to date HIV screening.   - HIV Antigen Antibody Combo; Future  - please call pt on cell phone with results      Welcome to return at anytime, plans to follow with provider at [facility].     Patient counseling completed today:  Discussed mechanism of action, potential risks/benefits/side effects of medications and other recommendations above.      Harm reduction counseling including availability of naloxone.    Discussed importance of avoiding isolation, building a network of supportive relationships, avoiding people/places/things associated with past use to reduce risk of relapse; including motivational interviewing regarding psychosocial  "treatment for addiction.     SUBJECTIVE                                                        CC/HPI:  Troy Mccoy is a 32 year old male with PMH  HX of hepatitis C, anxiety, severe alcohol use disorder, methamphetamine use disorder, in remission, and opioid use disorder who presents to the [facility] for follow up.      Brief History:  Troy Mccoy was first seen in Recovery Clinic on 23. They were referred by [facility]   Patient's reasons for seeking treatment on this date include starting suboxone. He has been using heroin/fentnayl 0.5-1 gram daily, last use was 23. He has history of daily IV meth use, last use , and history of drinking 1 gallon of vodka daily until 2018 when he was hospitalized and in ICU for seizures. He maintained 1 year of abstinence, and returned to alcohol use. He began using fentanyl/heroin IV 1 year ago,  and decreased his alcohol use to \"few drinks\", couple times per month. He entered residential treatment at [facility], was started on Suboxone at initial visit.       Substance Use History :  Opioids:   Age at first use: 14-15 years. For past year 0.5-1 gram daily. Using IV  Current use: substance: fentanayl, Heroin; quantity NA; route: fent inhalation, heroin, IV ; timing of last use: approx week of  ;                 IV drug use: Yes: heroin   History of overdose: Yes: 12 times  Previous residential or outpatient treatments for addiction : Yes: [facility] 23  Previous medication treatments for addiction: No  Longest period of sobriety: 1 year,   Medical complications related to substance use: no  Hepatitis C: Received treatment with [facility] , but has not had follow up lab work ; Date of most recent testin  HIV: pt report neg; Date of most recent testin     Other Addiction History:  Stimulants (cocaine, methamphetamine, MDMA/ecstasy)   Meth IV, hx of faily before . Last use was in " 2018  Sedatives/hypnotics/anxiolytics: (benzodiazepines, GHB, Ambien, phenobarbital)  BDZ sometimes, 2018  Alcohol:   Yes, HX of drinking 1 gallon a day of vodka until 2018. Since then drinking few drinks couple times monthly.   Nicotine: (cigarettes, vaping, chew/snuff)  vaping  Cannabis:   Yes, smokes couple of hits here and there.   Hallucinogens/Dissociatives: (acid, mushrooms, ketamine)  Yes, none recently  Eating disorder:  no  Gambling:              no        PAST PSYCHIATRIC HISTORY:  Diagnoses- anxiety  Suicide Attempts: Yes 2 years ago approx 2021  Hospitalizations: Yes 2 years ago      Social History  Housing status: [facility]  Employment status: Unemployed, not seeking work  Relationship status: Single  Children: no children  Legal: none  Insurance needs: active  Contact information up to date? yes        7/13/2023     1:00 PM 7/20/2023     1:00 PM   PHQ   PHQ-9 Total Score 22 15   Q9: Thoughts of better off dead/self-harm past 2 weeks Not at all Not at all           Most recent [facility] Clinic visit 7/13/23    A/P last visit:  1. Opioid use disorder, severe, dependence (H)  32 year old male with 1 year h/o of IV fentanyl/heroin, 0.5-1 gram daily, last use 7/2/23. He entered [facility] yesterday and is interested in starting suboxone.   -Plan to titrate suboxone to 16 mg TDD. Providing extra films to take an additional 4 mg if needed.   -It has been >7 days since his last use, okay to start suboxone.   -Confirms access to narcan at treatment, providing narcan today as well to have when he his discharged.   -Continue programming at [facility]. Encouraged plans for IOP with sober living at discharge.   -Attendance at support groups encouraged as able.   - Drugs of Abuse Screen Urine (POC CUPS) POCT; Standing  - Fentanyl Qualitative with Reflex to Quant Urine; Future  - Drugs of Abuse Screen Urine (POC CUPS) POCT  - Fentanyl Qualitative with Reflex to Quant Urine  - buprenorphine  HCl-naloxone HCl (SUBOXONE) 8-2 MG per film; Place 1 Film under the tongue 2 times daily  Dispense: 18 Film; Refill: 0  - naloxone (NARCAN) 4 MG/0.1ML nasal spray; Spray 1 spray (4 mg) into one nostril alternating nostrils as needed every 2-3 minutes until assistance arrives  Dispense: 0.2 mL; Refill: 11  - Fentanyl and Metabolite Quantitative, Urine     2. Opiate withdrawal (H)  Patient still experiencing withdrawal symptoms. Providing clonidine today.    Is prescribed gabapentin 800 mg TID by his PCP, last received 7/3. States he lost his prescription.  Providing refill today and increasing to 900 mg TID to help with anxiety and withdrawal symptoms.   -Recommend that he discuss future refills with his PCP.   - gabapentin (NEURONTIN) 300 MG capsule; Take 3 capsules (900 mg) by mouth 3 times daily  Dispense: 270 capsule; Refill: 0  - cloNIDine (CATAPRES) 0.1 MG tablet; Take 1 tablet (0.1 mg) by mouth 3 times daily as needed (opiate withdrawal (restlessness, anxiety, sweats))  Dispense: 15 tablet; Refill: 0     3. Alcohol use disorder, severe, dependence (H)  History of drinking 1 gallon of vodka daily until 2018. Currently drinking a few drinks a few times monthly. Reports occasional cravings.   -Gabapentin may provide some benefit.   -Consider adding acamprosate if cravings persist.   -Recommend rechecking hepatic profile at follow up. Encouraged him to complete prior to his follow up appointment   - Hepatic panel (Albumin, ALT, AST, Bili, Alk Phos, TP); Future     4. Methamphetamine use disorder, severe, in sustained remission (H)  Hx of daily IV meth use, in remission since 2018. Monitor for cravings.      5. History of hepatitis C  Received treatment with [facility] 2022, but has not had follow up lab work.    -Recommend rechecking Hep C RNA, encouraged to complete at follow up.    - Hepatitis C RNA, quantitative; Future  - Hepatic panel (Albumin, ALT, AST, Bili, Alk Phos, TP); Future     6. IVDU (intravenous drug  user)  Recommend rechecking HIV at follow up.        07/20/23 visit:  - here today for follow up. Reports he was able to start on suboxone without concerns. No precipitated withdrawal. Denies adverse SE. Currently taking as prescribed, taking 8 mg bid 4 mg daily prn. Was taking additional 4 mg daily prn. TDD 20 mg.   - saw NP at [facility] will take over management of Suboxone. This provider sent a prescription and increased his dose to 8 mg TID. He does not need Rx today. He wanted to follow up on lab work ordered at last visit.   - Denies methamphetamine use or cravings  - intermittent alcohol cravings, no recenet use,  not interested in medication at this time   - without other concerns today.     Labs discussed with patient?  Yes    [place] Prescription Drug Monitoring Program Reviewed:  Yes  07/13/2023 07/13/2023   3  Suboxone 8 Mg-2 Mg Sl Film 18.00  9      07/13/2023 07/13/2023   3  Gabapentin 300 Mg Capsule 270.00  30      Medications:  buprenorphine HCl-naloxone HCl (SUBOXONE) 8-2 MG per film, Place 1 Film under the tongue 2 times daily  cloNIDine (CATAPRES) 0.1 MG tablet, Take 1 tablet (0.1 mg) by mouth 3 times daily as needed (opiate withdrawal (restlessness, anxiety, sweats))  gabapentin (NEURONTIN) 300 MG capsule, Take 3 capsules (900 mg) by mouth 3 times daily  hydrOXYzine (ATARAX) 25 MG tablet, TAKE 1 TABLET BY MOUTH THREE TIMES A DAY AS NEEDED FOR ANXIETY. (Patient not taking: Reported on 7/13/2023)  naloxone (NARCAN) 4 MG/0.1ML nasal spray, Spray 1 spray (4 mg) into one nostril alternating nostrils as needed every 2-3 minutes until assistance arrives  QUEtiapine (SEROQUEL) 100 MG tablet, Take 100 mg by mouth 2 times daily (Patient not taking: Reported on 7/13/2023)    No current facility-administered medications on file prior to visit.      Allergies   Allergen Reactions     Cephalosporins Hives     Cefaclor Rash       PMH, PSH, FamHx reviewed      OBJECTIVE                                                       /89   Pulse 87     Physical Exam  Vitals and nursing note reviewed.   Constitutional:       General: He is not in acute distress.     Appearance: Normal appearance. He is not ill-appearing or diaphoretic.   HENT:      Nose: No rhinorrhea.   Eyes:      Extraocular Movements: Extraocular movements intact.   Pulmonary:      Effort: Pulmonary effort is normal.   Neurological:      Mental Status: He is alert and oriented to person, place, and time.   Psychiatric:         Mood and Affect: Mood normal.         Behavior: Behavior normal.         Thought Content: Thought content normal.         Judgment: Judgment normal.         Labs:    UDS:    Lab Results   Component Value Date    BUP Screen Positive (A) 07/20/2023    BZO Negative 07/20/2023    BAR Negative 07/20/2023    DELROY Negative 07/20/2023    MAMP Negative 07/20/2023    AMP Negative 07/20/2023    MDMA Negative 07/20/2023    MTD Negative 07/20/2023    HGU174 Negative 07/20/2023    OXY Negative 07/20/2023    PCP Negative 07/20/2023    THC Negative 07/20/2023    TEMP 96 F 07/20/2023    SGPOCT 1.025 07/20/2023     *POC urine drug screen does not screen for Fentanyl    Recent Results (from the past 240 hour(s))   Fentanyl Qualitative with Reflex to Quant Urine    Collection Time: 07/13/23  1:01 PM   Result Value Ref Range    Fentanyl Qual Urine Screen Positive (A) Screen Negative   Drugs of Abuse Screen Urine (POC CUPS) POCT    Collection Time: 07/13/23  1:02 PM   Result Value Ref Range    POCT Kit Lot Number [id number]     POCT Kit Expiration Date 86597897     Temperature Urine POCT 94 F 90 F, 92 F, 94 F, 96 F, 98 F, 100 F    Specific [place] POCT 1.025 1.005, 1.015, 1.025    pH Qual Urine POCT 5 pH 4 pH, 5 pH, 7 pH, 9 pH    Creatinine Qual Urine POCT 200 mg/dL 20 mg/dL, 50 mg/dL, 100 mg/dL, 200 mg/dL    Internal QC Qual Urine POCT Valid Valid    Amphetamine Qual Urine POCT Negative Negative    Barbiturate Qual Urine POCT Negative Negative     Buprenorphine Qual Urine POCT Negative Negative    Benzodiazepine Qual Urine POCT Negative Negative    Cocaine Qual Urine POCT Negative Negative    Methamphetamine Qual Urine POCT Negative Negative    MDMA Qual Urine POCT Negative Negative    Methadone Qual Urine POCT Negative Negative    Opiate Qual Urine POCT Negative Negative    Oxycodone Qual Urine POCT Negative Negative    Phencyclidine Qual Urine POCT Negative Negative    THC Qual Urine POCT Screen Positive (A) Negative   Drugs of Abuse Screen Urine (POC CUPS) POCT    Collection Time: 07/20/23  1:42 PM   Result Value Ref Range    POCT Kit Lot Number [id number]     POCT Kit Expiration Date [id number]     Temperature Urine POCT 96 F 90 F, 92 F, 94 F, 96 F, 98 F, 100 F    Specific [place] POCT 1.025 1.005, 1.015, 1.025    pH Qual Urine POCT 5 pH 4 pH, 5 pH, 7 pH, 9 pH    Creatinine Qual Urine POCT 50 mg/dL 20 mg/dL, 50 mg/dL, 100 mg/dL, 200 mg/dL    Internal QC Qual Urine POCT Valid Valid    Amphetamine Qual Urine POCT Negative Negative    Barbiturate Qual Urine POCT Negative Negative    Buprenorphine Qual Urine POCT Screen Positive (A) Negative    Benzodiazepine Qual Urine POCT Negative Negative    Cocaine Qual Urine POCT Negative Negative    Methamphetamine Qual Urine POCT Negative Negative    MDMA Qual Urine POCT Negative Negative    Methadone Qual Urine POCT Negative Negative    Opiate Qual Urine POCT Negative Negative    Oxycodone Qual Urine POCT Negative Negative    Phencyclidine Qual Urine POCT Negative Negative    THC Qual Urine POCT Negative Negative       ARMANDO Lopez [facility]  [address] [place] [id number] [phone]

## 2023-07-20 NOTE — NURSING NOTE
[facility]    Prescription being filled by NP at treatment center, he ran out of suboxone yesterday and getting med delivered today. 1 week follow up check in at  and get labs done  Rooming information:  Approximate last use of full opioid agonist: approx week 7/2/23  Taking buprenorphine? Yes:  As prescribed? Yes:   Number of buprenorphine films/tablets remaining currently: 0  Side effects related to buprenorphine (constipation, dry mouth, sedation?) No   Narcan currently available: Yes  Other recent substance use:    Denies  NICOTINE-Yes:   If using nicotine, ready to quit? No    Point of care urine drug screen positive for:  Lab Results   Component Value Date    BUP Screen Positive (A) 07/20/2023    BZO Negative 07/20/2023    BAR Negative 07/20/2023    DELROY Negative 07/20/2023    MAMP Negative 07/20/2023    AMP Negative 07/20/2023    MDMA Negative 07/20/2023    MTD Negative 07/20/2023    [id number] Negative 07/20/2023    OXY Negative 07/20/2023    PCP Negative 07/20/2023    THC Negative 07/20/2023    TEMP 96 F 07/20/2023    SGPOCT 1.025 07/20/2023       *POC urine drug screen does not screen for Fentanyl          7/20/2023     1:00 PM   PHQ Assesment Total Score(s)   PHQ-9 Score 15       If PHQ-9 score of 15 or higher, has Recovery Clinic therapist or provider been notified? Yes    Any current suicidal ideation? No  If yes, has [facility] therapist or provider been notified? N/A    Primary care provider: [facility] [place]     Mental health provider: not currently (follow up on MH referral if needed)    Insurance needs: active    Housing needs: stable    Contact information up to date? yes    3rd Party Involvement not today (please obtain LILIAN if pt would like to include)    Yuri Corey MA  July 20, 2023  1:37 PM

## 2023-07-22 LAB — HCV RNA SERPL NAA+PROBE-ACNC: NOT DETECTED IU/ML

## 2023-07-24 ENCOUNTER — TELEPHONE (OUTPATIENT)
Dept: BEHAVIORAL HEALTH | Facility: CLINIC | Age: 33
End: 2023-07-24
Payer: COMMERCIAL

## 2023-07-24 NOTE — TELEPHONE ENCOUNTER
----- Message from ARMANDO Lopez CNP sent at 7/24/2023  8:52 AM CDT -----  Reviewed. HIV screening negative. Hepatitis RNA not detected. Liver function panel with out abnormalities. No follow up needed at this time. Please call patient with results.     ARMANDO Lopez CNP on 7/24/2023 at 8:51 AM    Writer spoke with patient per provider directive to notify him of lab results as above. Patient conveyed understanding.    Laina Bagley RN on 7/24/2023 at 10:20 AM

## 2023-09-12 ENCOUNTER — OFFICE VISIT (OUTPATIENT)
Dept: BEHAVIORAL HEALTH | Facility: CLINIC | Age: 33
End: 2023-09-12
Payer: COMMERCIAL

## 2023-09-12 ENCOUNTER — TELEPHONE (OUTPATIENT)
Dept: BEHAVIORAL HEALTH | Facility: CLINIC | Age: 33
End: 2023-09-12

## 2023-09-12 VITALS — SYSTOLIC BLOOD PRESSURE: 152 MMHG | HEART RATE: 66 BPM | DIASTOLIC BLOOD PRESSURE: 93 MMHG

## 2023-09-12 DIAGNOSIS — F15.20 METHAMPHETAMINE USE DISORDER, SEVERE (H): ICD-10-CM

## 2023-09-12 DIAGNOSIS — R60.0 BILATERAL LEG EDEMA: ICD-10-CM

## 2023-09-12 DIAGNOSIS — F10.20 ALCOHOL USE DISORDER, SEVERE, DEPENDENCE (H): ICD-10-CM

## 2023-09-12 DIAGNOSIS — K59.03 THERAPEUTIC OPIOID-INDUCED CONSTIPATION (OIC): ICD-10-CM

## 2023-09-12 DIAGNOSIS — F11.20 OPIOID USE DISORDER, SEVERE, DEPENDENCE (H): Primary | ICD-10-CM

## 2023-09-12 DIAGNOSIS — F17.200 NICOTINE USE DISORDER: ICD-10-CM

## 2023-09-12 DIAGNOSIS — F51.01 PRIMARY INSOMNIA: ICD-10-CM

## 2023-09-12 DIAGNOSIS — F41.9 ANXIETY: ICD-10-CM

## 2023-09-12 DIAGNOSIS — T40.2X5A THERAPEUTIC OPIOID-INDUCED CONSTIPATION (OIC): ICD-10-CM

## 2023-09-12 LAB

## 2023-09-12 PROCEDURE — 99214 OFFICE O/P EST MOD 30 MIN: CPT | Performed by: FAMILY MEDICINE

## 2023-09-12 PROCEDURE — 80299 QUANTITATIVE ASSAY DRUG: CPT | Mod: 90 | Performed by: FAMILY MEDICINE

## 2023-09-12 PROCEDURE — 80307 DRUG TEST PRSMV CHEM ANLYZR: CPT | Performed by: FAMILY MEDICINE

## 2023-09-12 PROCEDURE — 99000 SPECIMEN HANDLING OFFICE-LAB: CPT | Performed by: FAMILY MEDICINE

## 2023-09-12 RX ORDER — LANOLIN ALCOHOL/MO/W.PET/CERES
100 CREAM (GRAM) TOPICAL DAILY
Qty: 90 TABLET | Refills: 3 | Status: SHIPPED | OUTPATIENT
Start: 2023-09-12 | End: 2024-09-30

## 2023-09-12 RX ORDER — CLONIDINE HYDROCHLORIDE 0.1 MG/1
0.1 TABLET ORAL 3 TIMES DAILY PRN
Qty: 90 TABLET | Refills: 0 | Status: SHIPPED | OUTPATIENT
Start: 2023-09-12 | End: 2023-10-10

## 2023-09-12 RX ORDER — POLYETHYLENE GLYCOL 3350 17 G/17G
1 POWDER, FOR SOLUTION ORAL 2 TIMES DAILY PRN
Qty: 578 G | Refills: 11 | Status: SHIPPED | OUTPATIENT
Start: 2023-09-12 | End: 2024-01-29

## 2023-09-12 RX ORDER — QUETIAPINE FUMARATE 100 MG/1
TABLET, FILM COATED ORAL
COMMUNITY
Start: 2023-08-11 | End: 2023-09-12

## 2023-09-12 RX ORDER — GABAPENTIN 300 MG/1
900 CAPSULE ORAL 3 TIMES DAILY
Qty: 270 CAPSULE | Refills: 0 | Status: SHIPPED | OUTPATIENT
Start: 2023-09-12 | End: 2023-10-10

## 2023-09-12 RX ORDER — AMOXICILLIN 875 MG
TABLET ORAL
COMMUNITY
Start: 2023-09-06 | End: 2023-10-10

## 2023-09-12 RX ORDER — FOLIC ACID 1 MG/1
1 TABLET ORAL DAILY
Qty: 90 TABLET | Refills: 3 | Status: SHIPPED | OUTPATIENT
Start: 2023-09-12 | End: 2023-11-07

## 2023-09-12 RX ORDER — QUETIAPINE FUMARATE 100 MG/1
100 TABLET, FILM COATED ORAL
Qty: 30 TABLET | Refills: 0 | Status: SHIPPED | OUTPATIENT
Start: 2023-09-12 | End: 2023-11-07

## 2023-09-12 RX ORDER — BUPRENORPHINE HYDROCHLORIDE AND NALOXONE HYDROCHLORIDE DIHYDRATE 8; 2 MG/1; MG/1
1 TABLET SUBLINGUAL 3 TIMES DAILY
Qty: 90 TABLET | Refills: 0 | Status: SHIPPED | OUTPATIENT
Start: 2023-09-12 | End: 2023-10-10

## 2023-09-12 RX ORDER — IBUPROFEN 600 MG/1
TABLET ORAL
COMMUNITY
Start: 2023-09-06 | End: 2023-10-15

## 2023-09-12 RX ORDER — CLONIDINE HYDROCHLORIDE 0.1 MG/1
TABLET ORAL
COMMUNITY
Start: 2023-08-16 | End: 2023-09-12

## 2023-09-12 RX ORDER — MULTIPLE VITAMINS W/ MINERALS TAB 9MG-400MCG
1 TAB ORAL DAILY
Qty: 90 TABLET | Refills: 3 | Status: SHIPPED | OUTPATIENT
Start: 2023-09-12 | End: 2024-09-30

## 2023-09-12 RX ORDER — ACETAMINOPHEN 325 MG/1
TABLET ORAL
COMMUNITY
Start: 2023-09-07 | End: 2023-10-10

## 2023-09-12 ASSESSMENT — PATIENT HEALTH QUESTIONNAIRE - PHQ9: SUM OF ALL RESPONSES TO PHQ QUESTIONS 1-9: 8

## 2023-09-12 NOTE — TELEPHONE ENCOUNTER
Pt expressed interest in scheduling w/ [facility] for individual therapy, does not appear this was scheduled.     Please contact pt to schedule individual therapy with MATTHEW Ford

## 2023-09-12 NOTE — NURSING NOTE
[facility]    Having difficulty with swelling in bilateral lower legs, left greater than right. Also feels swollen in his face.     Rooming information:  Approximate last use of full opioid agonist: 7/3/2023 - Fentanyl and Heroin  Taking buprenorphine? Yes: suboxone  How much per day? 24mg  Number of buprenorphine films/tablets remaining currently: approx 12  Side effects related to buprenorphine (constipation, dry mouth, sedation?) Yes: swelling, constipation, dry mouth   Narcan currently available: Yes  Other recent substance use:    Denies  NICOTINE-Yes: vape  If using nicotine, ready to quit? No    Point of care urine drug screen positive for:  [unfilled]  *POC urine drug screen does not screen for Fentanyl            9/12/2023    12:00 PM   PHQ Assesment Total Score(s)   PHQ-9 Score 8       If PHQ-9 score of 15 or higher, has [facility] therapist or provider been notified? N/A    Any current suicidal ideation? No  If yes, has [facility] therapist or provider been notified? N/A    Primary care provider: [facility] health provider: no (follow up on MH referral if needed)    Insurance needs: active    Housing needs: stable    Contact information up to date? yes    3rd Party Involvement  (please obtain LILIAN if pt would like to include)    YASMIN SADLER MA  September 12, 2023  12:51 PM

## 2023-09-12 NOTE — PROGRESS NOTES
[facility] Follow Up    ASSESSMENT/PLAN                                                      1. Opioid use disorder, severe, dependence (H)  Controlled  Continue buprenorphine 24mg/day  Continue programming with [facility]  Pt states he has naloxone  Recommend individual therapy  - Buprenorphine & Metabolite Screen; Future  - Fentanyl Qualitative with Reflex to Quant Urine; Future  - Drugs of Abuse Screen Urine (POC CUPS) POCT  - Buprenorphine & Metabolite Screen  - Fentanyl Qualitative with Reflex to Quant Urine  - buprenorphine-naloxone (SUBOXONE) 8-2 MG SUBL sublingual tablet; Place 1 tablet under the tongue 3 times daily  Dispense: 90 tablet; Refill: 0    2. Alcohol use disorder, severe, dependence (H)  Denies cravings  Start vitamin supplementation as noted.  Recommend follow-up with PCP to discuss his concerns about peripheral neuropathy.   Too early to refill gabapentin; pt will continue 800mg tid from rx filled 9/5/23, pharmacy is holding rx provided today  Continue programming at [facility]  - thiamine (B-1) 100 MG tablet; Take 1 tablet (100 mg) by mouth daily  Dispense: 90 tablet; Refill: 3  - folic acid (FOLVITE) 1 MG tablet; Take 1 tablet (1 mg) by mouth daily  Dispense: 90 tablet; Refill: 3  - multivitamin w/minerals (MULTIVITAMINS W/MINERALS) tablet; Take 1 tablet by mouth daily  Dispense: 90 tablet; Refill: 3  - gabapentin (NEURONTIN) 300 MG capsule; Take 3 capsules (900 mg) by mouth 3 times daily  Dispense: 270 capsule; Refill: 0    3. Methamphetamine use disorder, severe (H)  Denies cravings or return to use since 2018    4. Nicotine use disorder  Not ready to quit vaping at this time    5. Anxiety  Refilled clonidine  Too early to refill gabapentin; pt will continue 800mg tid from rx filled 9/5/23, pharmacy is holding rx provided today  Recommend individual therapy  Pt is uncertain if he wants to establish with new psychiatrist.   - gabapentin (NEURONTIN) 300 MG capsule; Take 3  capsules (900 mg) by mouth 3 times daily  Dispense: 270 capsule; Refill: 0  - cloNIDine (CATAPRES) 0.1 MG tablet; Take 1 tablet (0.1 mg) by mouth 3 times daily as needed (anxiety)  Dispense: 90 tablet; Refill: 0    6. Primary insomnia  Refilled Seroquel, pt states he is only taking ~2x/week.  Encouraged continuing to minimize dosing, reviewed metabolic side effects of prolonged use.   - QUEtiapine (SEROQUEL) 100 MG tablet; Take 1 tablet (100 mg) by mouth nightly as needed (insomnia)  Dispense: 30 tablet; Refill: 0    7. Therapeutic opioid-induced constipation (OIC)  Start Miralax  - polyethylene glycol (MIRALAX) 17 GM/Dose powder; Take 17 g (1 Capful) by mouth 2 times daily as needed for constipation  Dispense: 578 g; Refill: 11    8. Bilateral leg edema  Discussed possible etiology including combination of opioids and gabapentin w/ ibuprofen.  Pt not interested in lowering doses of buprenorphine or gabapentin.  Recommend compression stockings, elevation, avoiding prolonged sitting/standing.  Follow-up with PCP if not improving or any additional symptoms develop.     Return in about 4 weeks (around 10/10/2023) for Follow up, in person, with any available provider; and schedule with individual therapist.    Patient counseling completed today:  Discussed mechanism of action, potential risks/benefits/side effects of medications and other recommendations above.      Harm reduction counseling including availability of naloxone, never use alone.    Discussed importance of avoiding isolation, building a network of supportive relationships, avoiding people/places/things associated with past use to reduce risk of relapse; including motivational interviewing regarding psychosocial treatment for addiction.     SUBJECTIVE                                                        CC/HPI:  Troy Mccoy is a 33 year old male with PMH  HX of hepatitis C s/p treatment with [facility] 2022, seizures related to alcohol withdrawal, chronic  "LBP, anxiety,  alcohol use disorder, methamphetamine use disorder, and opioid use disorder on buprenorphine who [facility] to the [facility] for return visit.      Brief History:  Troy Mccoy was first seen in Recovery Clinic on 07/13/23. They were referred by staff at [facility] to assist pt in starting buprenorphine.  He has been using heroin/fentnayl 0.5-1 gram daily, last use was 7/2/23. He has history of daily IV meth use, last use 2018, and history of drinking 1 gallon of vodka daily until 2018 when he was hospitalized and in ICU for seizures. He maintained 1 year of abstinence, and returned to alcohol use. He began using fentanyl/heroin IV 1 year ago,  and decreased his alcohol use to \"few drinks\", couple times per month. He entered residential treatment at [facility], was started on Suboxone at initial visit.   Returned to  9/12/23 after graduating from [facility].  Had been prescribed buprenorphine by Elías Read NP while in [facility], unable to continue after graduation due to distance to [facility] for in person visits.  Starting OP with [facility].       Substance Use History :  Opioids:   Age at first use: 14-15 years.   Current use: substance: fentanyl, heroin; quantity 0.5-1 gram daily route: inhaled and IV; timing of last use: approx week of 7/2/223 ;                 IV drug use: Yes:   History of overdose: Yes: 12 times  Previous residential or outpatient treatments for addiction : Yes: [facility] 7/12/23  Previous medication treatments for addiction: No  Longest period of sobriety: 1 year, 2018  Medical complications related to substance use: no  Hepatitis C: Received treatment with [facility] 2022; 7/20/23 HCV RNA not detected  HIV: 7/20/23 HIV ag/ab nonreactive     Other Addiction History:  Stimulants   Meth IV, hx of daily use before 2018. Last use was in 2018  Sedatives/hypnotics/anxiolytics:   BDZ sometimes, 2018  Alcohol:   Yes, HX of drinking 1 gallon a day " of vodka until 2018. Since then drinking few drinks couple times monthly.   Nicotine:   vaping  Cannabis:   Yes, smokes couple of hits here and there.   Hallucinogens/Dissociatives:   Yes, none recently  Eating disorder:  no  Gambling:              no        PAST PSYCHIATRIC HISTORY:  Diagnoses- anxiety  Suicide Attempts: Yes  approx 2021  Hospitalizations: Yes 2021     Social History  Housing status: sober house  Employment status: Unemployed, not seeking work  Relationship status: Single  Children: no children  Legal: none  Insurance needs: active  Contact information up to date? yes        7/13/2023     1:00 PM 7/20/2023     1:00 PM 9/12/2023    12:00 PM   PHQ   PHQ-9 Total Score 22 15 8   Q9: Thoughts of better off dead/self-harm past 2 weeks Not at all Not at all Not at all           Most recent [facility] visit 7/20/23    A/P last visit:  1. Opioid use disorder, severe, dependence (H)  - S/p home induction, was taking 8 mg BID and 4 mg prn for cravings. No full opioid agonist use since his last visit. Was seen by NP at [facility] today. Plans to continue following with this provider. Dose was increased to 8 mg TID. No prescription needed today.  Confirms access to narcan   - Continue programming at [facility]    - Drugs of Abuse Screen Urine (POC CUPS) POCT    2. Alcohol use disorder, severe, dependence (H)  - Denies recent use, intermittent cravings. Discussed pharmacotherapy for AUD. Pt declines at this time. Has follow up with NP at [facility].   - Continue programming at [facility]      3. Methamphetamine use disorder, severe, in sustained remission (H)  - Denies recent use or cravings. Sustained remission since 2018.   - Continue programming at [facility]      4. History of hepatitis C  Received treatment with [facility] 2022, needs HCV RNA recheck, lab orders placed on 7/13/23, encouraged pt to stop at lab.    - Hepatitis C RNA, quantitative; Future  - Hepatic panel (Albumin,  "ALT, AST, Bili, Alk Phos, TP); Future    5. IVDU (intravenous drug user)  - Recommended up to date HIV screening.   - HIV Antigen Antibody Combo; Future  - please call pt on cell phone with results      Welcome to return at anytime, plans to follow with provider at [facility].          9/12/23 visit:  Pt returns to clinic to re-establish care.  Since his last visit he has been prescribed buprenorphine through staff at [facility] while at [facility].  Pt graduated from [facility] and is starting outpatient with [facility], living in a sober house.  He is unable to continue with [facility] due to lack of transportation.    Has continued on buprenorphine 24mg/day.  Denies cravings for opioids, or other substances.  Would like to continue daily dosed SL formulation to also address chronic low back pain.    Pt states he developed LE swelling that has persisted since he started buprenorphine.  He also takes gabapentin daily (dose recently decreased from 900mg tid to 800mg tid) and has been taking ibuprofen for dental pain in the last week.  He has noticed increased, L>R LE edema in the last week.  No orthopnea, no SOB, no CP, no nausea.    He is also experiencing side effects of constipation and dry mouth.  Has Biotene for dry mouth.  Not taking anything for constipation.    Plans to reschedule appt with a spine specialist to discuss recent MRI and plan for chronic LBP.  C/o \"neuropathy\" affecting both hands and both feet that he attributes to alcohol use.  He has not had EMG or neurology evaluation.    Also will be having multiple teeth extracted in the near future, has been approved for dentures.      Labs discussed with patient?  Yes    [place] Prescription Drug Monitoring Program Reviewed:  Yes  09/05/2023 08/15/2023 4 Gabapentin 800 Mg Tablet 90.00 30 [place] [id number] Gen (0831) 0/1  [place]   08/16/2023 08/15/2023 3 Suboxone 8 Mg-2 Mg Sl Film 90.00 30 [place] [id number] Gen (0831) 0/0 24.00 mg Comm " [place]   08/08/2023 07/03/2023 3 Gabapentin 800 Mg Tablet 90.00 30 Francia Hok [id number] Gen (0831) 0/1  [place]   07/20/2023 07/20/2023 3 Suboxone 8 Mg-2 Mg Sl Film 90.00 30 [place] [id number] Gen (0831) 0/0 24.[place] mg [place]   07/13/2023 07/13/2023 2 Gabapentin 300 Mg Capsule 270.00 30 [place] [id number] Javed (1359) 0/0  [place]icaid [place]   07/13/2023 07/13/2023 2 Suboxone 8 Mg-2 Mg Sl Film 18.00 9 He Bat [id number] Javed (1359) 0/0 16.00 mg [place]     Medications:  acetaminophen (TYLENOL) 325 MG tablet,   amoxicillin (AMOXIL) 875 MG tablet, TAKE ONE TABLET BY MOUTH EVERY TWELVE HOURS until gone*   MG tablet,   naloxone (NARCAN) 4 MG/0.1ML nasal spray, Spray 1 spray (4 mg) into one nostril alternating nostrils as needed every 2-3 minutes until assistance arrives    No current facility-administered medications on file prior to visit.      Allergies   Allergen Reactions    Cephalosporins Hives    Cefaclor Rash       PMH, PSH, FamHx reviewed      OBJECTIVE                                                      BP (!) 152/93   Pulse 66     Physical Exam  Vitals and nursing note reviewed.   Constitutional:       General: He is not in acute distress.     Appearance: He is overweight.   HENT:      Head: Atraumatic.      Nose: No rhinorrhea.   Eyes:      General: No scleral icterus.     Extraocular Movements: Extraocular movements intact.   Pulmonary:      Effort: Pulmonary effort is normal.   Musculoskeletal:      Comments: 2+ pitting edema B LE 1/2 way up    Neurological:      Mental Status: He is alert and oriented to person, place, and time.      Motor: No tremor.      Coordination: Coordination is intact.      Gait: Gait is intact.   Psychiatric:         Mood and Affect: Mood normal.         Behavior: Behavior normal. Behavior is cooperative.         Thought Content: Thought content normal.         Judgment: Judgment normal.         Labs:    UDS:    Lab Results   Component Value Date    BUP Screen Positive (A) 09/12/2023    BZO  Negative 09/12/2023    BAR Negative 09/12/2023    DELROY Negative 09/12/2023    MAMP Negative 09/12/2023    AMP Negative 09/12/2023    MDMA Negative 09/12/2023    MTD Negative 09/12/2023    SDY344 Negative 09/12/2023    OXY Negative 09/12/2023    PCP Negative 09/12/2023    THC Negative 09/12/2023    TEMP 94 F 09/12/2023    SGPOCT 1.015 09/12/2023     *POC urine drug screen does not screen for Fentanyl    Recent Results (from the past 240 hour(s))   Drugs of Abuse Screen Urine (POC CUPS) POCT    Collection Time: 09/12/23 12:58 PM   Result Value Ref Range    POCT Kit Lot Number [id number]     POCT Kit Expiration Date 9106751     Temperature Urine POCT 94 F 90 F, 92 F, 94 F, 96 F, 98 F, 100 F    Specific [place] POCT 1.015 1.005, 1.015, 1.025    pH Qual Urine POCT 5 pH 4 pH, 5 pH, 7 pH, 9 pH    Creatinine Qual Urine POCT 50 mg/dL 20 mg/dL, 50 mg/dL, 100 mg/dL, 200 mg/dL    Internal QC Qual Urine POCT Valid Valid    Amphetamine Qual Urine POCT Negative Negative    Barbiturate Qual Urine POCT Negative Negative    Buprenorphine Qual Urine POCT Screen Positive (A) Negative    Benzodiazepine Qual Urine POCT Negative Negative    Cocaine Qual Urine POCT Negative Negative    Methamphetamine Qual Urine POCT Negative Negative    MDMA Qual Urine POCT Negative Negative    Methadone Qual Urine POCT Negative Negative    Opiate Qual Urine POCT Negative Negative    Oxycodone Qual Urine POCT Negative Negative    Phencyclidine Qual Urine POCT Negative Negative    THC Qual Urine POCT Negative Negative   Fentanyl Qualitative with Reflex to Quant Urine    Collection Time: 09/12/23  1:00 PM   Result Value Ref Range    Fentanyl Qual Urine Screen Negative Screen Negative         Fannie Ayala MD  Addiction Medicine  [place] [id number] [address] [id number] [phone]

## 2023-09-13 NOTE — TELEPHONE ENCOUNTER
Hi, I called patient this morning and he stated he is currently in classes at his treatment, and COULD NOT talk right now. He explained that he will give   a call back to get scheduled with evert CAMPBELL

## 2023-09-15 LAB
BUPRENORPHINE UR-MCNC: 19 NG/ML
BUPRENORPHINE UR-MCNC: 339 NG/ML
NALOXONE UR CFM-MCNC: <100 NG/ML
NORBUPRENORPHINE UR CFM-MCNC: >1000 NG/ML
NORBUPRENORPHINE UR-MCNC: 136 NG/ML

## 2023-10-10 ENCOUNTER — OFFICE VISIT (OUTPATIENT)
Dept: BEHAVIORAL HEALTH | Facility: CLINIC | Age: 33
End: 2023-10-10
Payer: COMMERCIAL

## 2023-10-10 VITALS — SYSTOLIC BLOOD PRESSURE: 126 MMHG | OXYGEN SATURATION: 97 % | HEART RATE: 64 BPM | DIASTOLIC BLOOD PRESSURE: 78 MMHG

## 2023-10-10 DIAGNOSIS — F10.20 ALCOHOL USE DISORDER, SEVERE, DEPENDENCE (H): ICD-10-CM

## 2023-10-10 DIAGNOSIS — F51.01 PRIMARY INSOMNIA: ICD-10-CM

## 2023-10-10 DIAGNOSIS — F17.200 NICOTINE USE DISORDER: ICD-10-CM

## 2023-10-10 DIAGNOSIS — F41.9 ANXIETY: ICD-10-CM

## 2023-10-10 DIAGNOSIS — F11.20 OPIOID USE DISORDER, SEVERE, DEPENDENCE (H): Primary | ICD-10-CM

## 2023-10-10 DIAGNOSIS — F15.20 METHAMPHETAMINE USE DISORDER, SEVERE (H): ICD-10-CM

## 2023-10-10 LAB
AMPHETAMINE QUAL URINE POCT: NEGATIVE
BARBITURATE QUAL URINE POCT: NEGATIVE
BENZODIAZEPINE QUAL URINE POCT: NEGATIVE
BUPRENORPHINE QUAL URINE POCT: ABNORMAL
COCAINE QUAL URINE POCT: NEGATIVE
CREATININE QUAL URINE POCT: ABNORMAL
INTERNAL QC QUAL URINE POCT: ABNORMAL
MDMA QUAL URINE POCT: NEGATIVE
METHADONE QUAL URINE POCT: NEGATIVE
METHAMPHETAMINE QUAL URINE POCT: NEGATIVE
OPIATE QUAL URINE POCT: NEGATIVE
OXYCODONE QUAL URINE POCT: NEGATIVE
PH QUAL URINE POCT: ABNORMAL
PHENCYCLIDINE QUAL URINE POCT: NEGATIVE
POCT KIT EXPIRATION DATE: ABNORMAL
POCT KIT LOT NUMBER: ABNORMAL
SPECIFIC GRAVITY POCT: 1.02
TEMPERATURE URINE POCT: ABNORMAL
THC QUAL URINE POCT: NEGATIVE

## 2023-10-10 PROCEDURE — 80305 DRUG TEST PRSMV DIR OPT OBS: CPT | Performed by: FAMILY MEDICINE

## 2023-10-10 PROCEDURE — 99215 OFFICE O/P EST HI 40 MIN: CPT | Performed by: FAMILY MEDICINE

## 2023-10-10 RX ORDER — MIRTAZAPINE 15 MG/1
15 TABLET, FILM COATED ORAL AT BEDTIME
Qty: 30 TABLET | Refills: 1 | Status: SHIPPED | OUTPATIENT
Start: 2023-10-10 | End: 2023-12-04

## 2023-10-10 RX ORDER — CLONIDINE HYDROCHLORIDE 0.1 MG/1
0.1 TABLET ORAL 3 TIMES DAILY PRN
Qty: 90 TABLET | Refills: 0 | Status: SHIPPED | OUTPATIENT
Start: 2023-10-10 | End: 2023-11-07

## 2023-10-10 RX ORDER — BUPRENORPHINE HYDROCHLORIDE AND NALOXONE HYDROCHLORIDE DIHYDRATE 8; 2 MG/1; MG/1
1 TABLET SUBLINGUAL 4 TIMES DAILY
Qty: 120 TABLET | Refills: 0 | Status: SHIPPED | OUTPATIENT
Start: 2023-10-10 | End: 2023-11-07

## 2023-10-10 RX ORDER — GABAPENTIN 300 MG/1
900 CAPSULE ORAL 3 TIMES DAILY
Qty: 270 CAPSULE | Refills: 0 | Status: SHIPPED | OUTPATIENT
Start: 2023-10-10 | End: 2023-11-07

## 2023-10-10 ASSESSMENT — PATIENT HEALTH QUESTIONNAIRE - PHQ9: SUM OF ALL RESPONSES TO PHQ QUESTIONS 1-9: 7

## 2023-10-10 NOTE — PROGRESS NOTES
[facility] Follow Up    ASSESSMENT/PLAN                                                      1. Opioid use disorder, severe, dependence (H)  Endorsing cravings  Increase buprenorphine to 32mg TDD  Continue programming  Pt has Miralax for OIC  Confirms he has naloxone  - Drugs of Abuse Screen Urine (POC CUPS) POCT  - buprenorphine-naloxone (SUBOXONE) 8-2 MG SUBL sublingual tablet; Place 1 tablet under the tongue 4 times daily  Dispense: 120 tablet; Refill: 0    2. Alcohol use disorder, severe, dependence (H)  Denies cravings  Continue gabapentin 900mg tid  Continue programming  - gabapentin (NEURONTIN) 300 MG capsule; Take 3 capsules (900 mg) by mouth 3 times daily  Dispense: 270 capsule; Refill: 0    3. Methamphetamine use disorder, severe (H)  Denies cravings.   Continue programming    4. Nicotine use disorder  Not ready to quit vaping    5. Primary insomnia  Discontinue Seroquel  Starting mirtazapine as noted  - mirtazapine (REMERON) 15 MG tablet; Take 1 tablet (15 mg) by mouth at bedtime  Dispense: 30 tablet; Refill: 1    6. Anxiety  Continue clonidine prn and gabapentin scheduled.  Starting mirtazapine which may also provide some mood benefits.   - mirtazapine (REMERON) 15 MG tablet; Take 1 tablet (15 mg) by mouth at bedtime  Dispense: 30 tablet; Refill: 1  - gabapentin (NEURONTIN) 300 MG capsule; Take 3 capsules (900 mg) by mouth 3 times daily  Dispense: 270 capsule; Refill: 0  - cloNIDine (CATAPRES) 0.1 MG tablet; Take 1 tablet (0.1 mg) by mouth 3 times daily as needed (anxiety)  Dispense: 90 tablet; Refill: 0    Return in about 4 weeks (around 11/7/2023) for Follow up, in person.    Patient counseling completed today:  Discussed mechanism of action, potential risks/benefits/side effects of medications and other recommendations above.      Harm reduction counseling including availability of naloxone, never use alone.    Discussed importance of avoiding isolation, building a network of  "supportive relationships, avoiding people/places/things associated with past use to reduce risk of relapse; including motivational interviewing regarding psychosocial treatment for addiction.     SUBJECTIVE                                                        CC/HPI:  Troy Mccoy is a 33 year old male with PMH  HX of hepatitis C s/p treatment with [facility] 2022, seizures related to alcohol withdrawal, chronic LBP, anxiety,  alcohol use disorder, methamphetamine use disorder, and opioid use disorder on buprenorphine who presents to the Recovery Clinic for return visit.      Brief History:  Troy Mccoy was first seen in Recovery Clinic on 07/13/23. They were referred by staff at [facility] to assist pt in starting buprenorphine.  He has been using heroin/fentnayl 0.5-1 gram daily, last use was 7/2/23. He has history of daily IV meth use, last use 2018, and history of drinking 1 gallon of vodka daily until 2018 when he was hospitalized and in ICU for seizures. He maintained 1 year of abstinence, and returned to alcohol use. He began using fentanyl/heroin IV Summer 2022,  and decreased his alcohol use to \"few drinks\", couple times per month. He entered residential treatment at [facility], was started on Suboxone at initial visit.   Returned to  9/12/23 after graduating from [facility].  Had been prescribed buprenorphine by Elías Read NP while in [facility], unable to continue after graduation due to distance to [place] for in person visits.  Starting OP with Edith.       Substance Use History :  Opioids:   Age at first use: [age] years.   Current use: substance: fentanyl, heroin; quantity 0.5-1 gram daily route: inhaled and IV; timing of last use: approx week of 7/2/223 ;                 IV drug use: Yes:   History of overdose: Yes: 12 times  Previous residential or outpatient treatments for addiction : Yes: [facility] 7/12/23  Previous medication treatments for addiction: " No  Longest period of sobriety: 1 year, 2018  Medical complications related to substance use: no  Hepatitis C: Received treatment with [facility] 2022; 7/20/23 HCV RNA not detected  HIV: 7/20/23 HIV ag/ab nonreactive     Other Addiction History:  Stimulants   Meth IV, hx of daily use before 2018. Last use was in 2018  Sedatives/hypnotics/anxiolytics:   BDZ sometimes, 2018  Alcohol:   Yes, HX of drinking 1 gallon a day of vodka until 2018. Since then drinking few drinks couple times monthly.   Nicotine:   vaping  Cannabis:   Yes, smokes couple of hits here and there.   Hallucinogens/Dissociatives:   Yes, none recently  Eating disorder:  no  Gambling:              no        PAST PSYCHIATRIC HISTORY:  Diagnoses- anxiety  Suicide Attempts: Yes  approx 2021  Hospitalizations: Yes 2021     Social History  Housing status: sober house  Employment status: Unemployed, not seeking work  Relationship status: Single  Children: no children  Legal: none  Insurance needs: active  Contact information up to date? yes        7/13/2023     1:00 PM 7/20/2023     1:00 PM 9/12/2023    12:00 PM   PHQ   PHQ-9 Total Score 22 15 8   Q9: Thoughts of better off dead/self-harm past 2 weeks Not at all Not at all Not at all           Most recent Recovery Clinic visit 9/12/23  A/P last visit:  1. Opioid use disorder, severe, dependence (H)  Controlled  Continue buprenorphine 24mg/day  Continue programming with [email or web address]  Pt states he has naloxone  Recommend individual therapy  - Buprenorphine & Metabolite Screen; Future  - Fentanyl Qualitative with Reflex to Quant Urine; Future  - Drugs of Abuse Screen Urine (POC CUPS) POCT  - Buprenorphine & Metabolite Screen  - Fentanyl Qualitative with Reflex to Quant Urine  - buprenorphine-naloxone (SUBOXONE) 8-2 MG SUBL sublingual tablet; Place 1 tablet under the tongue 3 times daily  Dispense: 90 tablet; Refill: 0     2. Alcohol use disorder, severe, dependence (H)  Denies cravings  Start vitamin supplementation as  noted.  Recommend follow-up with PCP to discuss his concerns about peripheral neuropathy.   Too early to refill gabapentin; pt will continue 800mg tid from rx filled 9/5/23, pharmacy is holding rx provided today  [facility] at [facility]  - thiamine (B-1) 100 MG tablet; Take 1 tablet (100 mg) by mouth daily  Dispense: 90 tablet; Refill: 3  - folic acid (FOLVITE) 1 MG tablet; Take 1 tablet (1 mg) by mouth daily  Dispense: 90 tablet; Refill: 3  - multivitamin w/minerals (MULTIVITAMINS W/MINERALS) tablet; Take 1 tablet by mouth daily  Dispense: 90 tablet; Refill: 3  - gabapentin (NEURONTIN) 300 MG capsule; Take 3 capsules (900 mg) by mouth 3 times daily  Dispense: 270 capsule; Refill: 0     3. Methamphetamine use disorder, severe (H)  Denies cravings or return to use since 2018     4. Nicotine use disorder  Not ready to quit vaping at this time     5. Anxiety  Refilled clonidine  Too early to refill gabapentin; pt will continue 800mg tid from rx filled 9/5/23, pharmacy is holding rx provided today  Recommend individual therapy  Pt is uncertain if he wants to establish with new psychiatrist.   - gabapentin (NEURONTIN) 300 MG capsule; Take 3 capsules (900 mg) by mouth 3 times daily  Dispense: 270 capsule; Refill: 0  - cloNIDine (CATAPRES) 0.1 MG tablet; Take 1 tablet (0.1 mg) by mouth 3 times daily as needed (anxiety)  Dispense: 90 tablet; Refill: 0     6. Primary insomnia  Refilled Seroquel, pt states he is only taking ~2x/week.  Encouraged continuing to minimize dosing, reviewed metabolic side effects of prolonged use.   - QUEtiapine (SEROQUEL) 100 MG tablet; Take 1 tablet (100 mg) by mouth nightly as needed (insomnia)  Dispense: 30 tablet; Refill: 0     7. Therapeutic opioid-induced constipation (OIC)  Start Miralax  - polyethylene glycol (MIRALAX) 17 GM/Dose powder; Take 17 g (1 Capful) by mouth 2 times daily as needed for constipation  Dispense: 578 g; Refill: 11     8. Bilateral leg edema  Discussed possible  etiology including combination of opioids and gabapentin w/ ibuprofen.  Pt not interested in lowering doses of buprenorphine or gabapentin.  Recommend compression stockings, elevation, avoiding prolonged sitting/standing.  Follow-up with PCP if not improving or any additional symptoms develop.      Return in about 4 weeks (around 10/10/2023) for Follow up, in person, with any available provider; and schedule with individual therapist.      10/10/23 visit:  Pt reports he is taking buprenorphine as prescribed.  Endorses opioid cravings. No significant side effects.  Continues in programming.   Denies significant cravings for alcohol or methamphetamine.   Continues to endorse some anxiety.  Gabapentin is helpful.  No c/o sedation with increase from 800mg tid to 900mg tid.    Only taking Seroquel a couple of times per week.  Open to alternative sleep medication.   Swelling in legs has been improving.  No c/o CP, SOB, cough.      Labs discussed with patient?  Yes    [place] Prescription Drug Monitoring Program Reviewed:  Yes  10/03/2023 09/12/2023 5 Gabapentin 300 Mg Capsule 270.00 30  Vol [id number] Javed (5159) 0/0  Medicaid [place]   09/25/2023 09/12/2023 5 Buprenorphine-Nalox 8-2 Mg Tab 45.00 15  Vol [id number] Javed (5641) 1/0 24.00 mg Medicaid [place]   09/12/2023 09/12/2023 5 Buprenorphine-Nalox 8-2 Mg Tab 45.00 15  Vol [id number] Javed (9604) 0/0 24.00 mg Medicaid [place]     Medications:  acetaminophen (TYLENOL) 325 MG tablet,   amoxicillin (AMOXIL) 875 MG tablet, TAKE ONE TABLET BY MOUTH EVERY TWELVE HOURS until gone*  buprenorphine-naloxone (SUBOXONE) 8-2 MG SUBL sublingual tablet, Place 1 tablet under the tongue 3 times daily  cloNIDine (CATAPRES) 0.1 MG tablet, Take 1 tablet (0.1 mg) by mouth 3 times daily as needed (anxiety)  folic acid (FOLVITE) 1 MG tablet, Take 1 tablet (1 mg) by mouth daily  gabapentin (NEURONTIN) 300 MG capsule, Take 3 capsules (900 mg) by mouth 3 times daily   MG tablet,   multivitamin w/minerals  (MULTIVITAMINS W/MINERALS) tablet, Take 1 tablet by mouth daily  naloxone (NARCAN) 4 MG/0.1ML nasal spray, Spray 1 spray (4 mg) into one nostril alternating nostrils as needed every 2-3 minutes until assistance arrives  polyethylene glycol (MIRALAX) 17 GM/Dose powder, Take 17 g (1 Capful) by mouth 2 times daily as needed for constipation  QUEtiapine (SEROQUEL) 100 MG tablet, Take 1 tablet (100 mg) by mouth nightly as needed (insomnia)  thiamine (B-1) 100 MG tablet, Take 1 tablet (100 mg) by mouth daily    No current facility-administered medications on file prior to visit.      Allergies   Allergen Reactions    Cephalosporins Hives    Cefaclor Rash       PMH, PSH, FamHx reviewed      OBJECTIVE                                                      /78   Pulse 64   SpO2 97%     Physical Exam  Vitals and nursing note reviewed.   Constitutional:       General: He is not in acute distress.     Appearance: He is overweight.   HENT:      Head: Atraumatic.      Nose: No rhinorrhea.   Eyes:      General: No scleral icterus.     Extraocular Movements: Extraocular movements intact.   Pulmonary:      Effort: Pulmonary effort is normal.   Musculoskeletal:      Comments: trace pitting edema B LE 3/4 way up    Neurological:      Mental Status: He is alert and oriented to person, place, and time.      Motor: No tremor.      Coordination: Coordination is intact.      Gait: Gait is intact.   Psychiatric:         Mood and Affect: Mood normal.         Behavior: Behavior normal. Behavior is cooperative.         Thought Content: Thought content normal.         Judgment: Judgment normal.         Labs:    UDS:    Lab Results   Component Value Date    BUP Screen Positive (A) 10/10/2023    BZO Negative 10/10/2023    BAR Negative 10/10/2023    DELROY Negative 10/10/2023    MAMP Negative 10/10/2023    AMP Negative 10/10/2023    MDMA Negative 10/10/2023    MTD Negative 10/10/2023    [id number] Negative 10/10/2023    OXY Negative 10/10/2023     PCP Negative 10/10/2023    THC Negative 10/10/2023    TEMP Invalid (A) 10/10/2023    SGPOCT 1.025 10/10/2023     *POC urine drug screen does not screen for Fentanyl    Recent Results (from the past 240 hour(s))   Drugs of Abuse Screen Urine (POC CUPS) POCT    Collection Time: 10/10/23  1:15 PM   Result Value Ref Range    POCT Kit Lot Number [id number]     POCT Kit Expiration Date 2024-11-20     Temperature Urine POCT Invalid (A) 90 F, 92 F, 94 F, 96 F, 98 F, 100 F    Specific [place] POCT 1.025 1.005, 1.015, 1.025    pH Qual Urine POCT 7 pH 4 pH, 5 pH, 7 pH, 9 pH    Creatinine Qual Urine POCT 20 mg/dL 20 mg/dL, 50 mg/dL, 100 mg/dL, 200 mg/dL    Internal QC Qual Urine POCT Valid Valid    Amphetamine Qual Urine POCT Negative Negative    Barbiturate Qual Urine POCT Negative Negative    Buprenorphine Qual Urine POCT Screen Positive (A) Negative    Benzodiazepine Qual Urine POCT Negative Negative    Cocaine Qual Urine POCT Negative Negative    Methamphetamine Qual Urine POCT Negative Negative    MDMA Qual Urine POCT Negative Negative    Methadone Qual Urine POCT Negative Negative    Opiate Qual Urine POCT Negative Negative    Oxycodone Qual Urine POCT Negative Negative    Phencyclidine Qual Urine POCT Negative Negative    THC Qual Urine POCT Negative Negative       At least 40min spent in review of medical record,  review, obtaining histories, discussing recommendations, counseling    Fannie Ayala MD  Addiction Medicine  [place] [id number] [address] [address] [phone]

## 2023-10-10 NOTE — NURSING NOTE
[facility]      Rooming information:    Swelling better but still there    Needs results of UDS emailed to [facility] counselor.     Approximate last use of full opioid agonist: 7/3/2023  Taking buprenorphine? Yes:   How much per day? 8 mg TID  Number of buprenorphine films/tablets remaining currently: about 5  Side effects related to buprenorphine (constipation, dry mouth, sedation?) No   Narcan currently available: Yes  Other recent substance use:    Denies  NICOTINE-Yes: vaping  If using nicotine, ready to quit? No    Point of care urine drug screen positive for:  Lab Results   Component Value Date    BUP Screen Positive (A) 10/10/2023    BZO Negative 10/10/2023    BAR Negative 10/10/2023    DELROY Negative 10/10/2023    MAMP Negative 10/10/2023    AMP Negative 10/10/2023    MDMA Negative 10/10/2023    MTD Negative 10/10/2023    ZVT686 Negative 10/10/2023    OXY Negative 10/10/2023    PCP Negative 10/10/2023    THC Negative 10/10/2023    TEMP Invalid (A) 10/10/2023    SGPOCT 1.025 10/10/2023       *POC urine drug screen does not screen for Fentanyl          10/10/2023     1:00 PM   PHQ Assesment Total Score(s)   PHQ-9 Score 7       If PHQ-9 score of 15 or higher, has Recovery Clinic therapist or provider been notified? N/A    Any current suicidal ideation? No  If yes, has Recovery Clinic therapist or provider been notified? N/A    Primary care provider: [facility]     Mental health provider: None (follow up on MH referral if needed)    Insurance needs: Active    Housing needs: Stable    Contact information up to date? Yes    3rd Party Involvement: [facility] counselor  (please obtain LILIAN if pt would like to include)    Heidy Negron RN  October 10, 2023  1:12 PM

## 2023-11-07 ENCOUNTER — OFFICE VISIT (OUTPATIENT)
Dept: BEHAVIORAL HEALTH | Facility: CLINIC | Age: 33
End: 2023-11-07
Payer: COMMERCIAL

## 2023-11-07 VITALS — SYSTOLIC BLOOD PRESSURE: 139 MMHG | DIASTOLIC BLOOD PRESSURE: 79 MMHG | HEART RATE: 74 BPM

## 2023-11-07 DIAGNOSIS — F10.20 ALCOHOL USE DISORDER, SEVERE, DEPENDENCE (H): ICD-10-CM

## 2023-11-07 DIAGNOSIS — F11.20 OPIOID USE DISORDER, SEVERE, DEPENDENCE (H): Primary | ICD-10-CM

## 2023-11-07 DIAGNOSIS — F51.01 PRIMARY INSOMNIA: ICD-10-CM

## 2023-11-07 DIAGNOSIS — F17.200 NICOTINE USE DISORDER: ICD-10-CM

## 2023-11-07 DIAGNOSIS — F41.9 ANXIETY: ICD-10-CM

## 2023-11-07 LAB
AMPHETAMINE QUAL URINE POCT: NEGATIVE
BARBITURATE QUAL URINE POCT: NEGATIVE
BENZODIAZEPINE QUAL URINE POCT: NEGATIVE
BUPRENORPHINE QUAL URINE POCT: ABNORMAL
COCAINE QUAL URINE POCT: NEGATIVE
CREATININE QUAL URINE POCT: ABNORMAL
INTERNAL QC QUAL URINE POCT: ABNORMAL
MDMA QUAL URINE POCT: NEGATIVE
METHADONE QUAL URINE POCT: NEGATIVE
METHAMPHETAMINE QUAL URINE POCT: NEGATIVE
OPIATE QUAL URINE POCT: NEGATIVE
OXYCODONE QUAL URINE POCT: NEGATIVE
PH QUAL URINE POCT: ABNORMAL
PHENCYCLIDINE QUAL URINE POCT: NEGATIVE
POCT KIT EXPIRATION DATE: ABNORMAL
POCT KIT LOT NUMBER: ABNORMAL
SPECIFIC GRAVITY POCT: 1.01
TEMPERATURE URINE POCT: ABNORMAL
THC QUAL URINE POCT: NEGATIVE

## 2023-11-07 PROCEDURE — H0038 SELF-HELP/PEER SVC PER 15MIN: HCPCS

## 2023-11-07 PROCEDURE — 99214 OFFICE O/P EST MOD 30 MIN: CPT | Performed by: FAMILY MEDICINE

## 2023-11-07 PROCEDURE — 80305 DRUG TEST PRSMV DIR OPT OBS: CPT | Performed by: FAMILY MEDICINE

## 2023-11-07 RX ORDER — BUPRENORPHINE HYDROCHLORIDE AND NALOXONE HYDROCHLORIDE DIHYDRATE 8; 2 MG/1; MG/1
1 TABLET SUBLINGUAL DAILY
Qty: 30 TABLET | Refills: 0 | Status: SHIPPED | OUTPATIENT
Start: 2023-11-07 | End: 2023-11-07

## 2023-11-07 RX ORDER — CLONIDINE HYDROCHLORIDE 0.1 MG/1
.1-.2 TABLET ORAL 3 TIMES DAILY PRN
Qty: 180 TABLET | Refills: 0 | Status: SHIPPED | OUTPATIENT
Start: 2023-11-07 | End: 2023-12-04

## 2023-11-07 RX ORDER — GABAPENTIN 300 MG/1
900 CAPSULE ORAL 3 TIMES DAILY
Qty: 63 CAPSULE | Refills: 0 | Status: SHIPPED | OUTPATIENT
Start: 2023-11-07 | End: 2023-12-04

## 2023-11-07 RX ORDER — BUPRENORPHINE AND NALOXONE 12; 3 MG/1; MG/1
1 FILM, SOLUBLE BUCCAL; SUBLINGUAL 2 TIMES DAILY
Qty: 60 FILM | Refills: 0 | Status: SHIPPED | OUTPATIENT
Start: 2023-11-07 | End: 2023-12-04

## 2023-11-07 RX ORDER — GABAPENTIN 300 MG/1
900 CAPSULE ORAL 3 TIMES DAILY
Qty: 270 CAPSULE | Refills: 0 | Status: SHIPPED | OUTPATIENT
Start: 2023-11-07 | End: 2023-11-07

## 2023-11-07 RX ORDER — BUPRENORPHINE AND NALOXONE 8; 2 MG/1; MG/1
1 FILM, SOLUBLE BUCCAL; SUBLINGUAL DAILY
Qty: 30 FILM | Refills: 0 | Status: SHIPPED | OUTPATIENT
Start: 2023-11-07 | End: 2023-12-04

## 2023-11-07 ASSESSMENT — PATIENT HEALTH QUESTIONNAIRE - PHQ9: SUM OF ALL RESPONSES TO PHQ QUESTIONS 1-9: 4

## 2023-11-07 NOTE — PROGRESS NOTES
[facility]    Peer  met with Troy Mccoy in the Recovery Clinic to introduce herself, detail services provided and discuss current status of recovery. Pt appeared alert, oriented and open to feedback during our discussion.     Pt arrives for visit with provider for suboxone.  PRC sees patient today under provider diagnosis of opioid substance disorder, severe, dependence  (H)     Troy reports reported that his recovery is going well. He mentioned engaging in self-care activities such as playing frisbee golf. He also stated that he has been making an effort to eat healthier. Troy expressed satisfaction with the meetings at [facility].     He demonstrates a positive attitude towards self-care and is engaging in recreational activities like frisbee golf. His commitment to improving his diet indicates a proactive approach to his overall well-being.     [facility] provided business card to pt welcoming contact for recovery based support and resources. PRC and pt agree to speak again during an upcoming  visit.           Service Type:     Individual     Session Start Time:          12:45 pm             Session End Time:    1:00 pm pm    Session Length:         15    Patient Goal:   To utilize suboxone assisted treatment for sobriety and long term recovery.     Goal Progress:   Ongoing.    Key Risk Factors to Recovery:   PRC encourages being aware of risk factors that can lead to re-use which include avoiding isolation, avoiding triggers and managing cravings in a healthy manner. being open and willing to acceptance and change on a daily basis.  [facility] encourages pt to establish a sober network calling tree to reach out to when needed.  Continue to practice honesty with ourselves and trusted support person(s).   [facility] encourages regular attendance at recovery based meetings as well as finding a sponsor for mentoring and accountability.   [facility] encourages consideration of other services such  as counseling for mental health issues which can correlate with our substance use.      Support Needs:   Ongoing care, support and resources for opioid substance use disorder.     Follow up:   PRC has provided pt with her contact information for support and resource needs.    PRC and pt agree to meet during an upcoming  visit.       [facility]  [address], [address]   [place], [address]  Clinic Phone: [phone]  Clinic Fax: [phone]  Peer  phone: [phone]    Open Monday - Friday  9:00am-4:00pm  Walk in hours: 9am-3pm      Bernadette Zarco  November 7, 2023  12:58 PM    MADHAV Pettit provides clinical oversite and supervision of care.

## 2023-11-07 NOTE — NURSING NOTE
[facility]    Would like a copy of todays UDS results.     Rooming information:  Approximate last use of full opioid agonist: 7/3/23  Taking buprenorphine? Yes:   How much per day? 8mg TID  Number of buprenorphine films/tablets remaining currently: about 3 days worth   Side effects related to buprenorphine (constipation, dry mouth, sedation?) No   Narcan currently available: Yes  Other recent substance use:    Denies  NICOTINE-Yes:   If using nicotine, ready to quit? No    Point of care urine drug screen positive for:  Lab Results   Component Value Date    BUP Screen Positive (A) 11/07/2023    BZO Negative 11/07/2023    BAR Negative 11/07/2023    DELROY Negative 11/07/2023    MAMP Negative 11/07/2023    AMP Negative 11/07/2023    MDMA Negative 11/07/2023    MTD Negative 11/07/2023    EON706 Negative 11/07/2023    OXY Negative 11/07/2023    PCP Negative 11/07/2023    THC Negative 11/07/2023    TEMP 94 F 11/07/2023    SGPOCT 1.015 11/07/2023       *POC urine drug screen does not screen for Fentanyl          11/7/2023    12:00 PM   PHQ Assesment Total Score(s)   PHQ-9 Score 4       If PHQ-9 score of 15 or higher, has Recovery Clinic therapist or provider been notified? N/A    Any current suicidal ideation? No  If yes, has [facility] therapist or provider been notified? N/A    Primary care provider: [facility]     Mental health provider: none (follow up on MH referral if needed)    Insurance needs: active    Housing needs: stable    Current legal issues: none    Contact information up to date? yes    3rd Party Involvement none (please obtain LILIAN if pt would like to include)    Guillermo Powell MA  November 7, 2023  12:52 PM

## 2023-11-10 NOTE — PROGRESS NOTES
[facility] Recovery Clinic Follow Up    ASSESSMENT/PLAN                                                      1. Opioid use disorder, severe, dependence (H)  Controlled  Continue buprenorphine 32mg/day  Continue programming at [facility]  - Drugs of Abuse Screen Urine (POC CUPS) POCT  - Buprenorphine HCl-Naloxone HCl (SUBOXONE) 12-3 MG FILM per film; Place 1 Film under the tongue 2 times daily  Dispense: 60 Film; Refill: 0  - buprenorphine HCl-naloxone HCl (SUBOXONE) 8-2 MG per film; Place 1 Film under the tongue daily  Dispense: 30 Film; Refill: 0    2. Alcohol use disorder, severe, dependence (H)  Continue gabapentin unchanged  Continue programming at [facility]  - gabapentin (NEURONTIN) 300 MG capsule; Take 3 capsules (900 mg) by mouth 3 times daily  Dispense: 63 capsule; Refill: 0    3. Anxiety  Continue gabapentin unchanged  Increasing clonidine, recommend psychiatry evaluation.  He was given contact information to schedule with [facility].   - cloNIDine (CATAPRES) 0.1 MG tablet; Take 1-2 tablets (0.1-0.2 mg) by mouth 3 times daily as needed (anxiety)  Dispense: 180 tablet; Refill: 0  - gabapentin (NEURONTIN) 300 MG capsule; Take 3 capsules (900 mg) by mouth 3 times daily  Dispense: 63 capsule; Refill: 0    4. Nicotine use disorder  Not ready to quit    5. Primary insomnia  Continue mirtazapine 15mg qhs      Return in about 4 weeks (around 12/5/2023) for Follow up.    Patient counseling completed today:  Discussed mechanism of action, potential risks/benefits/side effects of medications and other recommendations above.      Harm reduction counseling including availability of naloxone, never use alone.    Discussed importance of avoiding isolation, building a network of supportive relationships, avoiding people/places/things associated with past use to reduce risk of relapse; including motivational interviewing regarding psychosocial treatment for addiction.     SUBJECTIVE                                                  "       CC/HPI:  Troy Mccoy is a 33 year old male with PMH  HX of hepatitis C s/p treatment with [facility] 2022, seizures related to alcohol withdrawal, chronic LBP, anxiety,  alcohol use disorder, methamphetamine use disorder, and opioid use disorder on buprenorphine who presents to the Recovery Clinic for return visit.      Brief History:  Troy Mccyo was first seen in Recovery Clinic on 07/13/23. They were referred by staff at [facility] to assist pt in starting buprenorphine.  He has been using heroin/fentnayl 0.5-1 gram daily, last use was 7/2/23. He has history of daily IV meth use, last use 2018, and history of drinking 1 gallon of vodka daily until 2018 when he was hospitalized and in ICU for seizures. He maintained 1 year of abstinence, and returned to alcohol use. He began using fentanyl/heroin IV Summer 2022,  and decreased his alcohol use to \"few drinks\", couple times per month. He entered residential treatment at [facility], was started on Suboxone at initial visit.   Returned to  9/12/23 after graduating from [facility].  Had been prescribed buprenorphine by Elías Read NP while in [facility], unable to continue after graduation due to distance to [facility] [place] for in person visits.  Started OP with [facility] 9/2023.   10/10/23 buprenorphine increased to 32mg TDD.     11/7/23 HPI:  Pt states he has been taking buprenorphine 32mg/day as prescribed.  Denies daytime sedation or other side effects.  States cravings for opioids has improved.    Requests increase in clonidine dose to address anxiety.  States current dose is no longer effective.   Has been taking mirtazapine 15mg/night and states this is helping him sleep.  He does not want to increase dose due to concern he would have morning grogginess.    Still in programming at [facility].       Substance Use History :  Opioids:   Age at first use: [age] years.   Current use: substance: fentanyl, heroin; quantity 0.5-1 gram daily " route: inhaled and IV; timing of last use: approx week of 7/2/223 ;                 IV drug use: Yes:   History of overdose: Yes: 12 times  Previous residential or outpatient treatments for addiction : Yes: [facility] 7/12/23  Previous medication treatments for addiction: No  Longest period of sobriety: 1 year, 2018  Medical complications related to substance use: no  Hepatitis C: Received treatment with [facility] 2022; 7/20/23 HCV RNA not detected  HIV: 7/20/23 HIV ag/ab nonreactive     Other Addiction History:  Stimulants   Meth IV, hx of daily use before 2018. Last use was in 2018  Sedatives/hypnotics/anxiolytics:   BDZ occasional  Alcohol:   Yes, HX of drinking 1 gallon a day of vodka until 2018. Since then drinking few drinks couple times monthly.   Nicotine:   vaping  Cannabis:   Yes, smokes couple of hits here and there.   Hallucinogens/Dissociatives:   Yes, none recently  Eating disorder:  no  Gambling:              no        PAST PSYCHIATRIC HISTORY:  Diagnoses- anxiety  Suicide Attempts: Yes  approx 2021  Hospitalizations: Yes 2021     Social History  Housing status: sober house  Employment status: Unemployed, not seeking work  Relationship status: Single  Children: no children  Legal: none  Insurance needs: active  Contact information up to date? yes        9/12/2023    12:00 PM 10/10/2023     1:00 PM 11/7/2023    12:00 PM   PHQ   PHQ-9 Total Score 8 7 4   Q9: Thoughts of better off dead/self-harm past 2 weeks Not at all Not at all Not at all         Labs discussed with patient?  Yes    [place] Prescription Drug Monitoring Program Reviewed:  Yes  10/03/2023 09/12/2023 5 Gabapentin 300 Mg Capsule 270.00 30 Li Vol [id number] Javed (8897) 0/0  Medicaid [place]   09/25/2023 09/12/2023 5 Buprenorphine-Nalox 8-2 Mg Tab 45.00 15 Li Vol [id number] Javed (9500) 1/0 24.00 mg [place]   09/12/2023 09/12/2023 5 Buprenorphine-Nalox 8-2 Mg Tab 45.00 15 Li Vol [id number] Javed (6091) 0/0 24.00 mg [place]      Medications:  mirtazapine (REMERON) 15 MG tablet, Take 1 tablet (15 mg) by mouth at bedtime  multivitamin w/minerals (MULTIVITAMINS W/MINERALS) tablet, Take 1 tablet by mouth daily  naloxone (NARCAN) 4 MG/0.1ML nasal spray, Spray 1 spray (4 mg) into one nostril alternating nostrils as needed every 2-3 minutes until assistance arrives (Patient not taking: Reported on 10/10/2023)  polyethylene glycol (MIRALAX) 17 GM/Dose powder, Take 17 g (1 Capful) by mouth 2 times daily as needed for constipation  thiamine (B-1) 100 MG tablet, Take 1 tablet (100 mg) by mouth daily    No current facility-administered medications on file prior to visit.      Allergies   Allergen Reactions    Cephalosporins Hives    Cefaclor Rash       PMH, PSH, FamHx reviewed      OBJECTIVE                                                      /79   Pulse 74     Physical Exam  Vitals and nursing note reviewed.   Constitutional:       General: He is not in acute distress.     Appearance: He is overweight.   HENT:      Head: Atraumatic.      Nose: No rhinorrhea.   Eyes:      General: No scleral icterus.     Extraocular Movements: Extraocular movements intact.   Pulmonary:      Effort: Pulmonary effort is normal.   Musculoskeletal:      Comments: trace pitting edema B LE 3/4 way up    Neurological:      Mental Status: He is alert and oriented to person, place, and time.      Motor: No tremor.      Coordination: Coordination is intact.      Gait: Gait is intact.   Psychiatric:         Mood and Affect: Mood normal.         Behavior: Behavior normal. Behavior is cooperative.         Thought Content: Thought content normal.         Judgment: Judgment normal.         Labs:    UDS:    Lab Results   Component Value Date    BUP Screen Positive (A) 11/07/2023    BZO Negative 11/07/2023    BAR Negative 11/07/2023    DELROY Negative 11/07/2023    MAMP Negative 11/07/2023    AMP Negative 11/07/2023    MDMA Negative 11/07/2023    MTD Negative 11/07/2023     [id number] Negative 11/07/2023    OXY Negative 11/07/2023    PCP Negative 11/07/2023    THC Negative 11/07/2023    TEMP 94 F 11/07/2023    SGPOCT 1.015 11/07/2023     *POC urine drug screen does not screen for Fentanyl    Recent Results (from the past 240 hour(s))   Drugs of Abuse Screen Urine (POC CUPS) POCT    Collection Time: 11/07/23  1:01 PM   Result Value Ref Range    POCT Kit Lot Number [id number]     POCT Kit Expiration Date 6/26/25     Temperature Urine POCT 94 F 90 F, 92 F, 94 F, 96 F, 98 F, 100 F    Specific [place] POCT 1.015 1.005, 1.015, 1.025    pH Qual Urine POCT 5 pH 4 pH, 5 pH, 7 pH, 9 pH    Creatinine Qual Urine POCT 50 mg/dL 20 mg/dL, 50 mg/dL, 100 mg/dL, 200 mg/dL    Internal QC Qual Urine POCT Valid Valid    Amphetamine Qual Urine POCT Negative Negative    Barbiturate Qual Urine POCT Negative Negative    Buprenorphine Qual Urine POCT Screen Positive (A) Negative    Benzodiazepine Qual Urine POCT Negative Negative    Cocaine Qual Urine POCT Negative Negative    Methamphetamine Qual Urine POCT Negative Negative    MDMA Qual Urine POCT Negative Negative    Methadone Qual Urine POCT Negative Negative    Opiate Qual Urine POCT Negative Negative    Oxycodone Qual Urine POCT Negative Negative    Phencyclidine Qual Urine POCT Negative Negative    THC Qual Urine POCT Negative Negative       At least 30min spent in review of medical record,  review, obtaining histories, discussing recommendations, counseling    Fannie Ayala MD  Addiction Medicine  [place] [id number] [address] [address] [phone]

## 2023-12-04 ENCOUNTER — OFFICE VISIT (OUTPATIENT)
Dept: BEHAVIORAL HEALTH | Facility: CLINIC | Age: 33
End: 2023-12-04
Payer: COMMERCIAL

## 2023-12-04 VITALS — DIASTOLIC BLOOD PRESSURE: 85 MMHG | HEART RATE: 78 BPM | SYSTOLIC BLOOD PRESSURE: 124 MMHG

## 2023-12-04 DIAGNOSIS — F10.20 ALCOHOL USE DISORDER, SEVERE, DEPENDENCE (H): ICD-10-CM

## 2023-12-04 DIAGNOSIS — F41.9 ANXIETY: ICD-10-CM

## 2023-12-04 DIAGNOSIS — F11.20 OPIOID USE DISORDER, SEVERE, DEPENDENCE (H): ICD-10-CM

## 2023-12-04 DIAGNOSIS — F51.01 PRIMARY INSOMNIA: ICD-10-CM

## 2023-12-04 LAB
AMPHETAMINE QUAL URINE POCT: NEGATIVE
BARBITURATE QUAL URINE POCT: NEGATIVE
BENZODIAZEPINE QUAL URINE POCT: NEGATIVE
BUPRENORPHINE QUAL URINE POCT: ABNORMAL
COCAINE QUAL URINE POCT: NEGATIVE
CREATININE QUAL URINE POCT: ABNORMAL
FENTANYL UR QL: NORMAL
INTERNAL QC QUAL URINE POCT: ABNORMAL
MDMA QUAL URINE POCT: NEGATIVE
METHADONE QUAL URINE POCT: NEGATIVE
METHAMPHETAMINE QUAL URINE POCT: NEGATIVE
OPIATE QUAL URINE POCT: NEGATIVE
OXYCODONE QUAL URINE POCT: NEGATIVE
PH QUAL URINE POCT: ABNORMAL
PHENCYCLIDINE QUAL URINE POCT: NEGATIVE
POCT KIT EXPIRATION DATE: ABNORMAL
POCT KIT LOT NUMBER: ABNORMAL
SPECIFIC GRAVITY POCT: 1.02
TEMPERATURE URINE POCT: ABNORMAL
THC QUAL URINE POCT: NEGATIVE

## 2023-12-04 PROCEDURE — 99000 SPECIMEN HANDLING OFFICE-LAB: CPT | Performed by: FAMILY MEDICINE

## 2023-12-04 PROCEDURE — 99214 OFFICE O/P EST MOD 30 MIN: CPT | Performed by: FAMILY MEDICINE

## 2023-12-04 PROCEDURE — G0480 DRUG TEST DEF 1-7 CLASSES: HCPCS | Mod: 90 | Performed by: FAMILY MEDICINE

## 2023-12-04 PROCEDURE — 80307 DRUG TEST PRSMV CHEM ANLYZR: CPT | Performed by: FAMILY MEDICINE

## 2023-12-04 RX ORDER — GABAPENTIN 300 MG/1
900 CAPSULE ORAL 3 TIMES DAILY
Qty: 270 CAPSULE | Refills: 0 | Status: SHIPPED | OUTPATIENT
Start: 2023-12-04 | End: 2024-01-02

## 2023-12-04 RX ORDER — CLONIDINE HYDROCHLORIDE 0.1 MG/1
.1-.2 TABLET ORAL 3 TIMES DAILY PRN
Qty: 180 TABLET | Refills: 0 | Status: SHIPPED | OUTPATIENT
Start: 2023-12-04 | End: 2024-01-02

## 2023-12-04 RX ORDER — BUPRENORPHINE AND NALOXONE 12; 3 MG/1; MG/1
1 FILM, SOLUBLE BUCCAL; SUBLINGUAL 2 TIMES DAILY
Qty: 60 FILM | Refills: 0 | Status: SHIPPED | OUTPATIENT
Start: 2023-12-04 | End: 2024-01-02

## 2023-12-04 RX ORDER — MIRTAZAPINE 15 MG/1
15 TABLET, FILM COATED ORAL AT BEDTIME
Qty: 30 TABLET | Refills: 0 | Status: SHIPPED | OUTPATIENT
Start: 2023-12-04 | End: 2024-01-02

## 2023-12-04 RX ORDER — BUPRENORPHINE AND NALOXONE 8; 2 MG/1; MG/1
1 FILM, SOLUBLE BUCCAL; SUBLINGUAL DAILY
Qty: 30 FILM | Refills: 0 | Status: SHIPPED | OUTPATIENT
Start: 2023-12-04 | End: 2024-01-02

## 2023-12-04 ASSESSMENT — PATIENT HEALTH QUESTIONNAIRE - PHQ9: SUM OF ALL RESPONSES TO PHQ QUESTIONS 1-9: 11

## 2023-12-04 NOTE — PROGRESS NOTES
[facility] Follow Up    ASSESSMENT/PLAN                                                    1. Opioid use disorder, severe, dependence (H)  Controlled  Continue buprenorphine 32mg/day  Pt has Miralax if needed for OIC  Pt confirms he has naloxone  Continue programming with [facility]  Buprenorphine/metabolite levels added today for monitoring of high risk medication  - Drugs of Abuse Screen Urine (POC CUPS) POCT  - buprenorphine HCl-naloxone HCl (SUBOXONE) 8-2 MG per film; Place 1 Film under the tongue daily  Dispense: 30 Film; Refill: 0  - Buprenorphine HCl-Naloxone HCl (SUBOXONE) 12-3 MG FILM per film; Place 1 Film under the tongue 2 times daily  Dispense: 60 Film; Refill: 0  - Fentanyl Qualitative with Reflex to Quant Urine; Future  - Fentanyl Qualitative with Reflex to Quant Urine    2. Primary insomnia  Continue mirtazapine unchanged  - mirtazapine (REMERON) 15 MG tablet; Take 1 tablet (15 mg) by mouth at bedtime  Dispense: 30 tablet; Refill: 0    3. Anxiety  Continue mirtazapine, gabapentin, clonidine unchanged.  Pt reporting control of symptoms with current medications.   - mirtazapine (REMERON) 15 MG tablet; Take 1 tablet (15 mg) by mouth at bedtime  Dispense: 30 tablet; Refill: 0  - gabapentin (NEURONTIN) 300 MG capsule; Take 3 capsules (900 mg) by mouth 3 times daily  Dispense: 270 capsule; Refill: 0  - cloNIDine (CATAPRES) 0.1 MG tablet; Take 1-2 tablets (0.1-0.2 mg) by mouth 3 times daily as needed (anxiety)  Dispense: 180 tablet; Refill: 0    4. Alcohol use disorder, severe, dependence (H)  Continue gabapentin 900mg tid  Continue programming at [facility]  - gabapentin (NEURONTIN) 300 MG capsule; Take 3 capsules (900 mg) by mouth 3 times daily  Dispense: 270 capsule; Refill: 0      Return in about 4 weeks (around 1/1/2024) for Follow up, in person in [facility].    Patient counseling completed today:  Discussed mechanism of action, potential risks/benefits/side effects of  "medications and other recommendations above.      Harm reduction counseling including availability of naloxone, never use alone.    Discussed importance of avoiding isolation, building a network of supportive relationships, avoiding people/places/things associated with past use to reduce risk of relapse; including motivational interviewing regarding psychosocial treatment for addiction.     SUBJECTIVE                                                        CC/HPI:  Troy Mccoy is a 33 year old male with PMH  HX of hepatitis C s/p treatment with [facility] 2022, seizures related to alcohol withdrawal, chronic LBP, anxiety,  alcohol use disorder, methamphetamine use disorder, and opioid use disorder on buprenorphine who presents to the [facility] Clinic for return visit.      Brief History:  Troy Mccoy was first seen in Recovery Clinic on 07/13/23. They were referred by staff at [facility] to assist pt in starting buprenorphine.  He has been using heroin/fentnayl 0.5-1 gram daily, last use was 7/2/23. He has history of daily IV meth use, last use 2018, and history of drinking 1 gallon of vodka daily until 2018 when he was hospitalized and in ICU for seizures. He maintained 1 year of abstinence, and returned to alcohol use. He began using fentanyl/heroin IV Summer 2022,  and decreased his alcohol use to \"few drinks\", couple times per month. He entered residential treatment at [facility], was started on Suboxone at initial visit.   Returned to  9/12/23 after graduating from [facility].  Had been prescribed buprenorphine by Elías Read NP while in [facility], unable to continue after graduation due to distance to [facility] [place] for in person visits.  Started OP with [facility] 9/2023.   10/10/23 buprenorphine increased to 32mg TDD.     12/4/23 HPI:  Taking total of 32mg buprenorphine/day.   Flushed extra 8mg buprenorphine tablets he received from pharmacy last visit.  Rx fills discussed w/ pharmacy " today.   Denies opioid cravings or return to use.   Still in programming at [facility] 5 days per week 8-12.    Starting to look for work.  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   States anxiety and sleep have been improving lately.  Taking clonidine 0.1mg bid and 0.2mg at bedtime.  Mirtazapine 15mg at night, happy he does not have morning grogginess he experienced with Seroquel.        Substance Use History :  Opioids:   Age at first use: [age] years.   Current use: substance: fentanyl, heroin; quantity 0.5-1 gram daily route: inhaled and IV; timing of last use: approx week of 7/2/223 ;                 IV drug use: Yes:   History of overdose: Yes: 12 times  Previous residential or outpatient treatments for addiction : Yes: [facility] 7/12/23  Previous medication treatments for addiction: No  Longest period of sobriety: 1 year, 2018  Medical complications related to substance use: no  Hepatitis C: Received treatment with [facility] 2022; 7/20/23 HCV RNA not detected  HIV: 7/20/23 HIV ag/ab nonreactive     Other Addiction History:  Stimulants   Meth IV, hx of daily use before 2018. Last use was in 2018  Sedatives/hypnotics/anxiolytics:   BDZ occasional  Alcohol:   Yes, HX of drinking 1 gallon a day of vodka until 2018. Since then drinking few drinks couple times monthly.   Nicotine:   vaping  Cannabis:   Yes, smokes couple of hits here and there.   Hallucinogens/Dissociatives:   Yes, none recently  Eating disorder:  no  Gambling:              no        PAST PSYCHIATRIC HISTORY:  Diagnoses- anxiety  Suicide Attempts: Yes  approx 2021  Hospitalizations: Yes 2021     Social History  Housing status: sober house  Employment status: Unemployed, not seeking work  Relationship status: Single  Children: no children  Legal: none  Insurance needs: active  Contact information up to date? yes        10/10/2023      1:00 PM 11/7/2023    12:00 PM 12/4/2023    12:00 PM   PHQ   PHQ-9 Total Score 7 4 11   Q9: Thoughts of better off dead/self-harm past 2 weeks Not at all Not at all Not at all         Labs discussed with patient?  Yes    [place] Prescription Drug Monitoring Program Reviewed:  Yes  11/28/2023 11/07/2023 5 Gabapentin 300 Mg Capsule 63.00 7 Li Vol [id number] Javed (1359) 0/0  [place]icaid [place]   11/07/2023 11/07/2023 5 Buprenorphine-Nalox 8-2 Mg Tab 30.00 30 Li Vol [id number] Javed (1359) 0/0 8.00 mg [place]   11/07/2023 11/07/2023 5 Suboxone 12 Mg-3 Mg Sl Film 60.00 30 Li Vol [id number] Javed (1359) 0/0 24.00 mg [place]   11/07/2023 11/07/2023 5 Suboxone 8 Mg-2 Mg Sl Film 30.00 30 Li Vol [id number] Javed (1359) 0/0 8.00 mg [place]   10/31/2023 10/10/2023 5 Gabapentin 300 Mg Capsule 270.00 30  Vol [id number] Javed (1359) 0/0  [place]icaid [place]     Medications:  Buprenorphine HCl-Naloxone HCl (SUBOXONE) 12-3 MG FILM per film, Place 1 Film under the tongue 2 times daily  buprenorphine HCl-naloxone HCl (SUBOXONE) 8-2 MG per film, Place 1 Film under the tongue daily  cloNIDine (CATAPRES) 0.1 MG tablet, Take 1-2 tablets (0.1-0.2 mg) by mouth 3 times daily as needed (anxiety)  gabapentin (NEURONTIN) 300 MG capsule, Take 3 capsules (900 mg) by mouth 3 times daily  mirtazapine (REMERON) 15 MG tablet, Take 1 tablet (15 mg) by mouth at bedtime  multivitamin w/minerals (MULTIVITAMINS W/MINERALS) tablet, Take 1 tablet by mouth daily  polyethylene glycol (MIRALAX) 17 GM/Dose powder, Take 17 g (1 Capful) by mouth 2 times daily as needed for constipation  thiamine (B-1) 100 MG tablet, Take 1 tablet (100 mg) by mouth daily  naloxone (NARCAN) 4 MG/0.1ML nasal spray, Spray 1 spray (4 mg) into one nostril alternating nostrils as needed every 2-3 minutes until assistance arrives (Patient not taking: Reported on 10/10/2023)    No current facility-administered medications on file prior to visit.      Allergies   Allergen Reactions    Cephalosporins Hives     Cefaclor Rash       PMH, PSH, FamHx reviewed      OBJECTIVE                                                      /85 (BP Location: Left arm, Patient Position: Sitting, Cuff Size: Adult Large)   Pulse 78     Physical Exam  Vitals and nursing note reviewed.   Constitutional:       General: He is not in acute distress.     Appearance: He is overweight.   HENT:      Head: Atraumatic.      Nose: No rhinorrhea.   Eyes:      General: No scleral icterus.     Extraocular Movements: Extraocular movements intact.   Pulmonary:      Effort: Pulmonary effort is normal.   Musculoskeletal:      Comments: trace pitting edema B LE 3/4 way up    Neurological:      Mental Status: He is alert and oriented to person, place, and time.      Motor: No tremor.      Coordination: Coordination is intact.      Gait: Gait is intact.   Psychiatric:         Mood and Affect: Mood normal.         Behavior: Behavior normal. Behavior is cooperative.         Thought Content: Thought content normal.         Judgment: Judgment normal.         Labs:    UDS:    Lab Results   Component Value Date    BUP Screen Positive (A) 12/04/2023    BZO Negative 12/04/2023    BAR Negative 12/04/2023    DELROY Negative 12/04/2023    MAMP Negative 12/04/2023    AMP Negative 12/04/2023    MDMA Negative 12/04/2023    MTD Negative 12/04/2023    [id number] Negative 12/04/2023    OXY Negative 12/04/2023    PCP Negative 12/04/2023    THC Negative 12/04/2023    TEMP 90 F 12/04/2023    SGPOCT 1.025 12/04/2023     *POC urine drug screen does not screen for Fentanyl    Recent Results (from the past 240 hour(s))   Drugs of Abuse Screen Urine (POC CUPS) POCT    Collection Time: 12/04/23 12:44 PM   Result Value Ref Range    POCT Kit Lot Number [id number]     POCT Kit Expiration Date 2025-06-26     Temperature Urine POCT 90 F 90 F, 92 F, 94 F, 96 F, 98 F, 100 F    Specific [place] POCT 1.025 1.005, 1.015, 1.025    pH Qual Urine POCT 7 pH 4 pH, 5 pH, 7 pH, 9 pH    Creatinine Qual Urine  POCT 20 mg/dL 20 mg/dL, 50 mg/dL, 100 mg/dL, 200 mg/dL    Internal QC Qual Urine POCT Valid Valid    Amphetamine Qual Urine POCT Negative Negative    Barbiturate Qual Urine POCT Negative Negative    Buprenorphine Qual Urine POCT Screen Positive (A) Negative    Benzodiazepine Qual Urine POCT Negative Negative    Cocaine Qual Urine POCT Negative Negative    Methamphetamine Qual Urine POCT Negative Negative    MDMA Qual Urine POCT Negative Negative    Methadone Qual Urine POCT Negative Negative    Opiate Qual Urine POCT Negative Negative    Oxycodone Qual Urine POCT Negative Negative    Phencyclidine Qual Urine POCT Negative Negative    THC Qual Urine POCT Negative Negative   Fentanyl Qualitative with Reflex to Quant Urine    Collection Time: 12/04/23  3:41 PM   Result Value Ref Range    Fentanyl Qual Urine Screen Negative Screen Negative       Fannie Ayala MD  Addiction Medicine  [place] [address] [address] [address]  [phone]

## 2023-12-04 NOTE — NURSING NOTE
[facility]      Rooming information:  Approximate last use of full opioid agonist: 07/03/2023  Taking buprenorphine? Yes:   How much per day? 32 mg  Number of buprenorphine films/tablets remaining currently: 9 films  Side effects related to buprenorphine (constipation, dry mouth, sedation?) No   Narcan currently available: Yes  Other recent substance use:    Denies  NICOTINE-Yes: Vaping  If using nicotine, ready to quit? No    Point of care urine drug screen positive for:  Lab Results   Component Value Date    BUP Screen Positive (A) 12/04/2023    BZO Negative 12/04/2023    BAR Negative 12/04/2023    DELROY Negative 12/04/2023    MAMP Negative 12/04/2023    AMP Negative 12/04/2023    MDMA Negative 12/04/2023    MTD Negative 12/04/2023    [id number] Negative 12/04/2023    OXY Negative 12/04/2023    PCP Negative 12/04/2023    THC Negative 12/04/2023    TEMP 90 F 12/04/2023    SGPOCT 1.025 12/04/2023       *POC urine drug screen does not screen for Fentanyl          12/4/2023    12:00 PM   PHQ Assesment Total Score(s)   PHQ-9 Score 11       If PHQ-9 score of 15 or higher, has Recovery Clinic therapist or provider been notified? No    Any current suicidal ideation? No  If yes, has [facility] therapist or provider been notified? N/A    Primary care provider: [facility]     Mental health provider: No (follow up on MH referral if needed)    Insurance needs: Active    Housing needs: Stable    Current legal issues: No    Contact information up to date? yes    3rd Party Involvement  (please obtain LILIAN if pt would like to include)    Angeles Noyola MA  December 4, 2023  12:33 PM

## 2023-12-07 LAB
BUPRENORPHINE UR-MCNC: 49 NG/ML
BUPRENORPHINE UR-MCNC: 664 NG/ML
NALOXONE UR CFM-MCNC: <100 NG/ML
NORBUPRENORPHINE UR CFM-MCNC: >1000 NG/ML
NORBUPRENORPHINE UR-MCNC: 310 NG/ML

## 2024-01-02 ENCOUNTER — OFFICE VISIT (OUTPATIENT)
Dept: ADDICTION MEDICINE | Facility: CLINIC | Age: 34
End: 2024-01-02
Payer: COMMERCIAL

## 2024-01-02 VITALS
HEART RATE: 52 BPM | BODY MASS INDEX: 39.17 KG/M2 | HEIGHT: 75 IN | OXYGEN SATURATION: 97 % | DIASTOLIC BLOOD PRESSURE: 71 MMHG | SYSTOLIC BLOOD PRESSURE: 134 MMHG | WEIGHT: 315 LBS | RESPIRATION RATE: 16 BRPM

## 2024-01-02 DIAGNOSIS — F10.20 ALCOHOL USE DISORDER, SEVERE, DEPENDENCE (H): ICD-10-CM

## 2024-01-02 DIAGNOSIS — Z51.81 ENCOUNTER FOR MONITORING OPIOID MAINTENANCE THERAPY: ICD-10-CM

## 2024-01-02 DIAGNOSIS — F41.9 ANXIETY: ICD-10-CM

## 2024-01-02 DIAGNOSIS — F11.20 OPIOID USE DISORDER, SEVERE, DEPENDENCE (H): Primary | ICD-10-CM

## 2024-01-02 DIAGNOSIS — Z79.891 ENCOUNTER FOR MONITORING OPIOID MAINTENANCE THERAPY: ICD-10-CM

## 2024-01-02 DIAGNOSIS — F51.01 PRIMARY INSOMNIA: ICD-10-CM

## 2024-01-02 PROCEDURE — 99214 OFFICE O/P EST MOD 30 MIN: CPT | Performed by: NURSE PRACTITIONER

## 2024-01-02 PROCEDURE — 80305 DRUG TEST PRSMV DIR OPT OBS: CPT | Performed by: NURSE PRACTITIONER

## 2024-01-02 RX ORDER — BUPRENORPHINE AND NALOXONE 8; 2 MG/1; MG/1
1 FILM, SOLUBLE BUCCAL; SUBLINGUAL DAILY
Qty: 30 FILM | Refills: 0 | Status: SHIPPED | OUTPATIENT
Start: 2024-01-02 | End: 2024-01-29

## 2024-01-02 RX ORDER — MIRTAZAPINE 15 MG/1
15 TABLET, FILM COATED ORAL AT BEDTIME
Qty: 30 TABLET | Refills: 0 | Status: SHIPPED | OUTPATIENT
Start: 2024-01-02 | End: 2024-01-29

## 2024-01-02 RX ORDER — CLONIDINE HYDROCHLORIDE 0.1 MG/1
.1-.2 TABLET ORAL 3 TIMES DAILY PRN
Qty: 180 TABLET | Refills: 0 | Status: SHIPPED | OUTPATIENT
Start: 2024-01-02 | End: 2024-01-29

## 2024-01-02 RX ORDER — BUPRENORPHINE AND NALOXONE 12; 3 MG/1; MG/1
1 FILM, SOLUBLE BUCCAL; SUBLINGUAL 2 TIMES DAILY
Qty: 60 FILM | Refills: 0 | Status: SHIPPED | OUTPATIENT
Start: 2024-01-02 | End: 2024-01-29

## 2024-01-02 RX ORDER — GABAPENTIN 300 MG/1
900 CAPSULE ORAL 3 TIMES DAILY
Qty: 270 CAPSULE | Refills: 0 | Status: SHIPPED | OUTPATIENT
Start: 2024-01-02 | End: 2024-01-29

## 2024-01-02 ASSESSMENT — ANXIETY QUESTIONNAIRES
6. BECOMING EASILY ANNOYED OR IRRITABLE: SEVERAL DAYS
IF YOU CHECKED OFF ANY PROBLEMS ON THIS QUESTIONNAIRE, HOW DIFFICULT HAVE THESE PROBLEMS MADE IT FOR YOU TO DO YOUR WORK, TAKE CARE OF THINGS AT HOME, OR GET ALONG WITH OTHER PEOPLE: SOMEWHAT DIFFICULT
3. WORRYING TOO MUCH ABOUT DIFFERENT THINGS: SEVERAL DAYS
1. FEELING NERVOUS, ANXIOUS, OR ON EDGE: SEVERAL DAYS
7. FEELING AFRAID AS IF SOMETHING AWFUL MIGHT HAPPEN: NOT AT ALL
4. TROUBLE RELAXING: SEVERAL DAYS
2. NOT BEING ABLE TO STOP OR CONTROL WORRYING: SEVERAL DAYS
GAD7 TOTAL SCORE: 5
GAD7 TOTAL SCORE: 5
5. BEING SO RESTLESS THAT IT IS HARD TO SIT STILL: NOT AT ALL

## 2024-01-02 ASSESSMENT — PATIENT HEALTH QUESTIONNAIRE - PHQ9: SUM OF ALL RESPONSES TO PHQ QUESTIONS 1-9: 7

## 2024-01-02 ASSESSMENT — PAIN SCALES - GENERAL: PAINLEVEL: NO PAIN (0)

## 2024-01-02 NOTE — PROGRESS NOTES
[facility] - Addiction Medicine       Rooming information: Hx of opiate abuse.      Point of care urine drug screen positive for:  Lab Results   Component Value Date    BUP Screen Positive (A) 01/02/2024    BZO Negative 01/02/2024    BAR Negative 01/02/2024    DELROY Negative 01/02/2024    MAMP Negative 01/02/2024    AMP Negative 01/02/2024    MDMA Negative 01/02/2024    MTD Negative 01/02/2024    [id number] Negative 01/02/2024    OXY Negative 01/02/2024    PCP Negative 01/02/2024    THC Negative 01/02/2024    TEMP 92 F 01/02/2024    SGPOCT 1.015 01/02/2024       *POC urine drug screen does not screen for Fentanyl    PHQ-9 Scores:       10/10/2023     1:00 PM 11/7/2023    12:00 PM 12/4/2023    12:00 PM   PHQ   PHQ-9 Total Score 7 4 11   Q9: Thoughts of better off dead/self-harm past 2 weeks Not at all Not at all Not at all     ZENIA-7 Scores:       No data to display                Any other recent substance use:     Denies    NICOTINE-Yes:   If using nicotine, ready to quit? No    Side effects related to medications pt would like to discuss with provider (constipation, dry mouth, HA, GI upset, sedation?) No     Narcan currently available: Yes    Primary care provider: [facility]     Mental health provider: Psychiatry (follow up on MH referral if needed)    Any housing, insurance deficits?: None    Contact information up to date? yes    3rd Party Involvement No (please obtain LILIAN if pt would like to include)    Everette Paredes RN on 1/2/2024 at 12:43 PM

## 2024-01-02 NOTE — PROGRESS NOTES
[facility] Addiction Medicine    A/P                                                    ASSESSMENT/PLAN    1. Opioid use disorder, severe, dependence (H)  Has 6 months of abstinence this week. Cravings are controlled with suboxone 32 mg TDD.   Is considering transferring to [facility].   Plan to continue suboxone without changes  Continue programming at [facility], and remain in sober living.   Continue meeting attendance.   Confirms narcan access.   - Buprenorphine HCl-Naloxone HCl (SUBOXONE) 12-3 MG FILM per film; Place 1 Film under the tongue 2 times daily  Dispense: 60 Film; Refill: 0  - buprenorphine HCl-naloxone HCl (SUBOXONE) 8-2 MG per film; Place 1 Film under the tongue daily  Dispense: 30 Film; Refill: 0    2. Alcohol use disorder, severe, dependence (H)  Denies cravings or return to use. Continue gabapentin 900 mg TID  - gabapentin (NEURONTIN) 300 MG capsule; Take 3 capsules (900 mg) by mouth 3 times daily  Dispense: 270 capsule; Refill: 0    3. Encounter for monitoring opioid maintenance therapy  - Drugs of Abuse Screen Urine (POC CUPS) POCT; Standing  - Drugs of Abuse Screen Urine (POC CUPS) POCT    4. Anxiety  Symptoms managed with clonidine and gabapentin, continue.   - cloNIDine (CATAPRES) 0.1 MG tablet; Take 1-2 tablets (0.1-0.2 mg) by mouth 3 times daily as needed (anxiety)  Dispense: 180 tablet; Refill: 0  - gabapentin (NEURONTIN) 300 MG capsule; Take 3 capsules (900 mg) by mouth 3 times daily  Dispense: 270 capsule; Refill: 0  - mirtazapine (REMERON) 15 MG tablet; Take 1 tablet (15 mg) by mouth at bedtime  Dispense: 30 tablet; Refill: 0    5. Primary insomnia  Sleeping well with mirtazapine and clonidine. Continue  - mirtazapine (REMERON) 15 MG tablet; Take 1 tablet (15 mg) by mouth at bedtime  Dispense: 30 tablet; Refill: 0    Orders Placed This Encounter   Medications    Buprenorphine HCl-Naloxone HCl (SUBOXONE) 12-3 MG FILM per film     Sig: Place 1 Film under the tongue 2 times daily      Dispense:  60 Film     Refill:  0    buprenorphine HCl-naloxone HCl (SUBOXONE) 8-2 MG per film     Sig: Place 1 Film under the tongue daily     Dispense:  30 Film     Refill:  0    cloNIDine (CATAPRES) 0.1 MG tablet     Sig: Take 1-2 tablets (0.1-0.2 mg) by mouth 3 times daily as needed (anxiety)     Dispense:  180 tablet     Refill:  0    gabapentin (NEURONTIN) 300 MG capsule     Sig: Take 3 capsules (900 mg) by mouth 3 times daily     Dispense:  270 capsule     Refill:  0    mirtazapine (REMERON) 15 MG tablet     Sig: Take 1 tablet (15 mg) by mouth at bedtime     Dispense:  30 tablet     Refill:  0       Problem list updated Jan 2, 2024   No problems updated.          PDMP Review         Value Time User    State PDMP site checked  Yes 12/4/2023 12:55 PM Fannie Ayala MD              RTC  Return in about 27 days (around 1/29/2024) for Follow up, with me, in person 130 pm.      Counseled the patient on the importance of having a recovery program in addition to medication to manage recovery.  Components include avoiding isolating, having willingness to change, avoiding triggers and managing cravings. Encouraged having some type of sober network and practicing honesty with trusted support person(s). Encouraged other services such as counseling, 12 step or other self-help organizations.      Opioid warning reviewed.  Risk of overdose following a period of abstinence due to decrease tolerance was discussed including risk of death.  Strongly recommended abstain from alcohol, benzodiazepines, THC, opioids and other drugs of abuse.  Increased risk of return to opioid use after use of these substances discussed.  Increased risk of overdose/death with use of other substances particularly benzodiazepines/alcohol reviewed.        SUBJECTIVE                                                    CC/HPI:  Troy Mccoy is a 33 year old male with PMH  HX of hepatitis C s/p treatment with [facility] 2022, seizures related to alcohol  "withdrawal, chronic LBP, anxiety,  alcohol use disorder, methamphetamine use disorder, and opioid use disorder on buprenorphine who presents to the Recovery Clinic for return visit.      Brief History:  Troy Mccoy was first seen in Recovery Clinic on 07/13/23. They were referred by staff at [facility] to assist pt in starting buprenorphine.  He has been using heroin/fentnayl 0.5-1 gram daily, last use was 7/2/23. He has history of daily IV meth use, last use 2018, and history of drinking 1 gallon of vodka daily until 2018 when he was hospitalized and in ICU for seizures. He maintained 1 year of abstinence, and returned to alcohol use. He began using fentanyl/heroin IV Summer 2022,  and decreased his alcohol use to \"few drinks\", couple times per month. He entered residential treatment at [facility], was started on Suboxone at initial visit.   Returned to  9/12/23 after graduating from [facility].  Had been prescribed buprenorphine by Elías Read NP while in [facility], unable to continue after graduation due to distance to [place] for in person visits.  Started OP with [facility] 9/2023.   10/10/23 buprenorphine increased to 32mg TDD.            Substance Use History :  Opioids:   Age at first use: [age] years.   Current use: substance: fentanyl, heroin; quantity 0.5-1 gram daily route: inhaled and IV; timing of last use: approx week of 7/2/223 ;                 IV drug use: Yes:   History of overdose: Yes: 12 times  Previous residential or outpatient treatments for addiction : Yes: [facility] 7/12/23  Previous medication treatments for addiction: No  Longest period of sobriety: 1 year, 2018  Medical complications related to substance use: no  Hepatitis C: Received treatment with [facility] 2022; 7/20/23 HCV RNA not detected  HIV: 7/20/23 HIV ag/ab nonreactive     Other Addiction History:  Stimulants   Meth IV, hx of daily use before 2018. Last use was in " 2018  Sedatives/hypnotics/anxiolytics:   BDZ occasional  Alcohol:   Yes, HX of drinking 1 gallon a day of vodka until 2018. Since then drinking few drinks couple times monthly.   Nicotine:   vaping  Cannabis:   Yes, smokes couple of hits here and there.   Hallucinogens/Dissociatives:   Yes, none recently  Eating disorder:  no  Gambling:              no        PAST PSYCHIATRIC HISTORY:  Diagnoses- anxiety  Suicide Attempts: Yes  approx 2021  Hospitalizations: Yes 2021     Social History  Housing status: sober house  Employment status: Unemployed, not seeking work  Relationship status: Single  Children: no children  Legal: none  Insurance needs: active  Contact information up to date? yes       Recent HPI Details:12/4/3  12/4/23 HPI:  Taking total of 32mg buprenorphine/day.   Flushed extra 8mg buprenorphine tablets he received from pharmacy last visit.  Rx fills discussed w/ pharmacy today.   Denies opioid cravings or return to use.   Still in programming at [email or web address] 5 days per week 8-12.    Starting to look for work.  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   States anxiety and sleep have been improving lately.  Taking clonidine 0.1mg bid and 0.2mg at bedtime.  Mirtazapine 15mg at night, happy he does not have morning grogginess he experienced with Seroquel.    1. Opioid use disorder, severe, dependence (H)  Controlled  Continue buprenorphine 32mg/day  Pt has Miralax if needed for OIC  Pt confirms he has naloxone  Continue programming with [email or web address]  Buprenorphine/metabolite levels added today for monitoring of high risk medication  - Drugs of Abuse Screen Urine (POC CUPS) POCT  - buprenorphine HCl-naloxone HCl (SUBOXONE) 8-2 MG per film; Place 1 Film under the tongue daily  Dispense: 30 Film; Refill: 0  - Buprenorphine HCl-Naloxone HCl (SUBOXONE) 12-3 MG FILM per film; Place 1 Film under the tongue 2 times  "daily  Dispense: 60 Film; Refill: 0  - Fentanyl Qualitative with Reflex to Quant Urine; Future  - Fentanyl Qualitative with Reflex to Quant Urine     2. Primary insomnia  Continue mirtazapine unchanged  - mirtazapine (REMERON) 15 MG tablet; Take 1 tablet (15 mg) by mouth at bedtime  Dispense: 30 tablet; Refill: 0     3. Anxiety  Continue mirtazapine, gabapentin, clonidine unchanged.  Pt reporting control of symptoms with current medications.   - mirtazapine (REMERON) 15 MG tablet; Take 1 tablet (15 mg) by mouth at bedtime  Dispense: 30 tablet; Refill: 0  - gabapentin (NEURONTIN) 300 MG capsule; Take 3 capsules (900 mg) by mouth 3 times daily  Dispense: 270 capsule; Refill: 0  - cloNIDine (CATAPRES) 0.1 MG tablet; Take 1-2 tablets (0.1-0.2 mg) by mouth 3 times daily as needed (anxiety)  Dispense: 180 tablet; Refill: 0     4. Alcohol use disorder, severe, dependence (H)  Continue gabapentin 900mg tid  Continue programming at [facility]  - gabapentin (NEURONTIN) 300 MG capsule; Take 3 capsules (900 mg) by mouth 3 times daily  Dispense: 270 capsule; Refill: 0        Return in about 4 weeks (around 1/1/2024) for Follow up, in person in [facility].    TODAY'S VISIT  HPI Jan 2, 2024  -transferring from , last visit 12/4/23  Still in [facility] and living in sober housing. Will complete programming in March  -Still attending meetings and has a mentor.   Started a new job as a  in [place]  Mood has been really good, Medications working well.         11/7/2023    12:00 PM 12/4/2023    12:00 PM 1/2/2024    12:00 PM   PHQ   PHQ-9 Total Score 4 11 7   Q9: Thoughts of better off dead/self-harm past 2 weeks Not at all Not at all Not at all           OBJECTIVE                                                    PHYSICAL EXAM:  /71 (BP Location: Right arm, Patient Position: Sitting, Cuff Size: Adult Large)   Pulse 52   Resp 16   Ht 1.905 m (6' 3\")   Wt 144.2 kg (318 lb)   SpO2 97%   BMI 39.75 kg/m  "       Physical Exam  HENT:      Head: Normocephalic.   Eyes:      Conjunctiva/sclera: Conjunctivae normal.   Cardiovascular:      Rate and Rhythm: Normal rate.   Pulmonary:      Effort: Pulmonary effort is normal. No respiratory distress.   Neurological:      General: No focal deficit present.      Mental Status: He is alert and oriented to person, place, and time.      Coordination: Coordination is intact.      Gait: Gait is intact.   Psychiatric:         Attention and Perception: Attention normal.         Mood and Affect: Mood normal.         Speech: Speech normal.         Behavior: Behavior is cooperative.         Thought Content: Thought content normal. Thought content does not include suicidal ideation.         Cognition and Memory: Cognition normal.         Judgment: Judgment normal.         LAB  Results for orders placed or performed in visit on 01/02/24   Drugs of Abuse Screen Urine (POC CUPS) POCT     Status: Abnormal   Result Value Ref Range    POCT Kit Lot Number [id number]     POCT Kit Expiration Date 2024-06-08     Temperature Urine POCT 92 F 90 F, 92 F, 94 F, 96 F, 98 F, 100 F    Specific [place] POCT 1.015 1.005, 1.015, 1.025    pH Qual Urine POCT 5 pH 4 pH, 5 pH, 7 pH, 9 pH    Creatinine Qual Urine POCT 50 mg/dL 20 mg/dL, 50 mg/dL, 100 mg/dL, 200 mg/dL    Internal QC Qual Urine POCT Valid Valid    Amphetamine Qual Urine POCT Negative Negative    Barbiturate Qual Urine POCT Negative Negative    Buprenorphine Qual Urine POCT Screen Positive (A) Negative    Benzodiazepine Qual Urine POCT Negative Negative    Cocaine Qual Urine POCT Negative Negative    Methamphetamine Qual Urine POCT Negative Negative    MDMA Qual Urine POCT Negative Negative    Methadone Qual Urine POCT Negative Negative    Opiate Qual Urine POCT Negative Negative    Oxycodone Qual Urine POCT Negative Negative    Phencyclidine Qual Urine POCT Negative Negative    THC Qual Urine POCT Negative Negative         HISTORY                                                     Problem list reviewed & adjusted, as indicated.  There is no problem list on file for this patient.        MEDICATION LIST (prior to visit)  naloxone (NARCAN) 4 MG/0.1ML nasal spray, Spray 1 spray (4 mg) into one nostril alternating nostrils as needed every 2-3 minutes until assistance arrives  polyethylene glycol (MIRALAX) 17 GM/Dose powder, Take 17 g (1 Capful) by mouth 2 times daily as needed for constipation  multivitamin w/minerals (MULTIVITAMINS W/MINERALS) tablet, Take 1 tablet by mouth daily (Patient not taking: Reported on 1/2/2024)  thiamine (B-1) 100 MG tablet, Take 1 tablet (100 mg) by mouth daily (Patient not taking: Reported on 1/2/2024)    No current facility-administered medications on file prior to visit.      MEDICATION LIST (after visit)  Current Outpatient Medications   Medication    Buprenorphine HCl-Naloxone HCl (SUBOXONE) 12-3 MG FILM per film    buprenorphine HCl-naloxone HCl (SUBOXONE) 8-2 MG per film    cloNIDine (CATAPRES) 0.1 MG tablet    gabapentin (NEURONTIN) 300 MG capsule    mirtazapine (REMERON) 15 MG tablet    naloxone (NARCAN) 4 MG/0.1ML nasal spray    polyethylene glycol (MIRALAX) 17 GM/Dose powder    multivitamin w/minerals (MULTIVITAMINS W/MINERALS) tablet    thiamine (B-1) 100 MG tablet     No current facility-administered medications for this visit.         Allergies   Allergen Reactions    Cephalosporins Hives    Cefaclor Rash         Mandy Castillo, POLINA,MSN, AGNP-C  [facility]   [address], [address] [place] [id number]   Phone # [phone]

## 2024-01-02 NOTE — PATIENT INSTRUCTIONS
Patient Education   Addiction Medicine  What to Expect  Here's what to expect from our Addiction Medicine program.  About Addiction Medicine  Addiction Medicine clinics help you with substance use problems. You set your own goals. We try to help you reach your goals. Your care plan can include:  Medicine  Creating a recovery plan  Helping you find local resources  Helping with treatment options  Clinic phone number and addresses  Clinic Phone: [phone]  Mental Health and Addiction Clinic  [facility]  [address], [address]  [place] [address]  [facility]  [address] [facility], [address]  [place] [address]  Walk-in services  We offer walk-in care for patients at the Recovery Clinic. This is only for patients with Opioid Use Disorder (OUD). Anyone with OUD is welcome. Our providers will refer you to the Recovery Clinic if you're struggling to keep up with your medicines or appointments.  Recovery Clinic ([facility])  [address], [address]  [place] [address]  Phone: [phone]  The Recovery Clinic is open for walk-ins Monday to Friday 9 a.m. to 11:30 a.m. and 12:30 p.m. to 3 p.m.  How it works  Come to your visits every time. The treatment works better when you do.   You can have as many visits as you need. When you're better, we'll refer you back to being cared for by your family doctor.   If you need it, we'll send you to doctors, psychiatrists, therapists, and other providers. We focus on treating addiction. We don't treat other problems, like managing other medicines or non-addiction issues.  About visits  Urine drug testing  We'll often test your pee (urine) for drugs. This is the only way we can know for sure whether or not you're using drugs. It helps us treat you without judgement.   Suboxone (buprenorphine)  If you're taking buprenorphine, you'll have a lot of visits at first. If your problem is getting worse, or you're  "using substances, we may schedule you for extra visits.   Cancelling visits  If you can't come to your visit, please call us right away at [phone]. If you don't cancel at least 24 hours (1 full day) before your visit, that's \"late cancellation.\"  Being late to visits  If you come late, you may not be seen. This will count as a \"no-show.\"  Please call the clinic if you're running late. This will help us plan, but it doesn't mean you'll be seen.   Being late is:  More than 15 minutes late for a return visit.  More than 30 minutes late for your first visit.  If you cancel late or don't show up 2 times within 6 months, we may transfer you to another clinic.   Getting help between visits  If you need help between visits, you can call us Monday to Friday from 8 a.m. to 4:30 p.m. at [phone]. You can also send us a message on [facility].  Medicine refills  If you miss or cancel a visit, you can still ask for a refill. But we can only refill your medicines if you've made a new appointment.  Please call your pharmacy for medicine refills. If you have a question about your refill, call us at [phone].  It takes up to 2 business days to refill your drugs. Let us know 2 to 3 days before you run out. Don't call more than 1 week before you run out. That's too early.   Please make sure we have your right phone number.  If we have a problem with your refill, we'll call you. If we call you, please call us back right away. If you don't, you may not get your medicines quickly.   Call your pharmacy to find out if your medicines are ready.   Keep your medicines in a safe place. Keep them away from pets and children. If your medicines are lost or stolen, we usually don't replace them. We recommend you file a police report if your medicines are stolen. Your insurance may not pay for early refills, even if you have a prescription.  Forms  Please give us at least 3 business days to fill out any forms. Bring the forms to your " visits if you can. We may refer you to other members of your care team to complete the forms.   Emergency care   Call 911 or go to the nearest emergency room if your life or someone else's life is in danger.  Call 988 anytime for the Suicide and Crisis Lifeline.  If you need care when we're closed, call your family doctor to see if they can help. You can also go to urgent care or an emergency room. [facility] emergency rooms may be able to give you buprenorphine or other medicine refills.  Thank you for choosing us for your care.  For informational purposes only. Not to replace the advice of your health care provider. Copyright   2023 [facility]. All rights reserved. [employer] [id number] - REV 05/23.

## 2024-01-29 ENCOUNTER — OFFICE VISIT (OUTPATIENT)
Dept: ADDICTION MEDICINE | Facility: CLINIC | Age: 34
End: 2024-01-29
Payer: COMMERCIAL

## 2024-01-29 VITALS
BODY MASS INDEX: 39.17 KG/M2 | SYSTOLIC BLOOD PRESSURE: 137 MMHG | HEIGHT: 75 IN | DIASTOLIC BLOOD PRESSURE: 71 MMHG | HEART RATE: 62 BPM | WEIGHT: 315 LBS

## 2024-01-29 DIAGNOSIS — F10.20 ALCOHOL USE DISORDER, SEVERE, DEPENDENCE (H): ICD-10-CM

## 2024-01-29 DIAGNOSIS — F11.20 OPIOID USE DISORDER, SEVERE, DEPENDENCE (H): Primary | ICD-10-CM

## 2024-01-29 DIAGNOSIS — F51.01 PRIMARY INSOMNIA: ICD-10-CM

## 2024-01-29 DIAGNOSIS — K59.03 THERAPEUTIC OPIOID-INDUCED CONSTIPATION (OIC): ICD-10-CM

## 2024-01-29 DIAGNOSIS — F41.9 ANXIETY: ICD-10-CM

## 2024-01-29 DIAGNOSIS — T40.2X5A THERAPEUTIC OPIOID-INDUCED CONSTIPATION (OIC): ICD-10-CM

## 2024-01-29 PROCEDURE — 80305 DRUG TEST PRSMV DIR OPT OBS: CPT | Performed by: NURSE PRACTITIONER

## 2024-01-29 PROCEDURE — 99214 OFFICE O/P EST MOD 30 MIN: CPT | Performed by: NURSE PRACTITIONER

## 2024-01-29 RX ORDER — CLONIDINE HYDROCHLORIDE 0.1 MG/1
.1-.2 TABLET ORAL 3 TIMES DAILY PRN
Qty: 180 TABLET | Refills: 1 | Status: SHIPPED | OUTPATIENT
Start: 2024-01-29 | End: 2024-03-25

## 2024-01-29 RX ORDER — BUPRENORPHINE AND NALOXONE 12; 3 MG/1; MG/1
1 FILM, SOLUBLE BUCCAL; SUBLINGUAL 2 TIMES DAILY
Qty: 60 FILM | Refills: 1 | Status: SHIPPED | OUTPATIENT
Start: 2024-01-29 | End: 2024-03-25

## 2024-01-29 RX ORDER — BUPRENORPHINE AND NALOXONE 8; 2 MG/1; MG/1
1 FILM, SOLUBLE BUCCAL; SUBLINGUAL DAILY
Qty: 30 FILM | Refills: 1 | Status: SHIPPED | OUTPATIENT
Start: 2024-01-29 | End: 2024-03-25

## 2024-01-29 RX ORDER — GABAPENTIN 300 MG/1
900 CAPSULE ORAL 3 TIMES DAILY
Qty: 270 CAPSULE | Refills: 1 | Status: SHIPPED | OUTPATIENT
Start: 2024-01-29 | End: 2024-03-25

## 2024-01-29 RX ORDER — MIRTAZAPINE 15 MG/1
15 TABLET, FILM COATED ORAL AT BEDTIME
Qty: 30 TABLET | Refills: 1 | Status: SHIPPED | OUTPATIENT
Start: 2024-01-29 | End: 2024-03-25

## 2024-01-29 RX ORDER — POLYETHYLENE GLYCOL 3350 17 G/17G
1 POWDER, FOR SOLUTION ORAL 2 TIMES DAILY PRN
Qty: 578 G | Refills: 11 | Status: SHIPPED | OUTPATIENT
Start: 2024-01-29 | End: 2024-09-30

## 2024-01-29 ASSESSMENT — PAIN SCALES - GENERAL: PAINLEVEL: NO PAIN (0)

## 2024-01-29 ASSESSMENT — ANXIETY QUESTIONNAIRES
5. BEING SO RESTLESS THAT IT IS HARD TO SIT STILL: SEVERAL DAYS
2. NOT BEING ABLE TO STOP OR CONTROL WORRYING: NOT AT ALL
GAD7 TOTAL SCORE: 4
1. FEELING NERVOUS, ANXIOUS, OR ON EDGE: SEVERAL DAYS
4. TROUBLE RELAXING: SEVERAL DAYS
GAD7 TOTAL SCORE: 4
7. FEELING AFRAID AS IF SOMETHING AWFUL MIGHT HAPPEN: NOT AT ALL
6. BECOMING EASILY ANNOYED OR IRRITABLE: SEVERAL DAYS
3. WORRYING TOO MUCH ABOUT DIFFERENT THINGS: NOT AT ALL

## 2024-01-29 ASSESSMENT — PATIENT HEALTH QUESTIONNAIRE - PHQ9: SUM OF ALL RESPONSES TO PHQ QUESTIONS 1-9: 5

## 2024-01-29 NOTE — PATIENT INSTRUCTIONS
Patient Education   Addiction Medicine  What to Expect  Here's what to expect from our Addiction Medicine program.  About Addiction Medicine  Addiction Medicine clinics help you with substance use problems. You set your own goals. We try to help you reach your goals. Your care plan can include:  Medicine  Creating a recovery plan  Helping you find local resources  Helping with treatment options  Clinic phone number and addresses  Clinic Phone: [phone]  Mental Health and Addiction Clinic  [facility]  [address], [address]  [place] [address]  [facility]  [address], [address]  [place] [address]  Walk-in services  We offer walk-in care for patients at the Recovery Clinic. This is only for patients with Opioid Use Disorder (OUD). Anyone with OUD is welcome. Our providers will refer you to the Recovery Clinic if you're struggling to keep up with your medicines or appointments.  Recovery Clinic ([facility])  [address], [address]  [place] [address]  Phone: [phone]  The Recovery Clinic is open for walk-ins Monday to Friday 9 a.m. to 11:30 a.m. and 12:30 p.m. to 3 p.m.  How it works  Come to your visits every time. The treatment works better when you do.   You can have as many visits as you need. When you're better, we'll refer you back to being cared for by your family doctor.   If you need it, we'll send you to doctors, psychiatrists, therapists, and other providers. We focus on treating addiction. We don't treat other problems, like managing other medicines or non-addiction issues.  About visits  Urine drug testing  We'll often test your pee (urine) for drugs. This is the only way we can know for sure whether or not you're using drugs. It helps us treat you without judgement.   Suboxone (buprenorphine)  If you're taking buprenorphine, you'll have a lot of visits at first. If your problem is getting worse, or you're  "using substances, we may schedule you for extra visits.   Cancelling visits  If you can't come to your visit, please call us right away at [phone]. If you don't cancel at least 24 hours (1 full day) before your visit, that's \"late cancellation.\"  Being late to visits  If you come late, you may not be seen. This will count as a \"no-show.\"  Please call the clinic if you're running late. This will help us plan, but it doesn't mean you'll be seen.   Being late is:  More than 15 minutes late for a return visit.  More than 30 minutes late for your first visit.  If you cancel late or don't show up 2 times within 6 months, we may transfer you to another clinic.   Getting help between visits  If you need help between visits, you can call us Monday to Friday from 8 a.m. to 4:30 p.m. at [phone]. You can also send us a message on [employer].  Medicine refills  If you miss or cancel a visit, you can still ask for a refill. But we can only refill your medicines if you've made a new appointment.  Please call your pharmacy for medicine refills. If you have a question about your refill, call us at [phone].  It takes up to 2 business days to refill your drugs. Let us know 2 to 3 days before you run out. Don't call more than 1 week before you run out. That's too early.   Please make sure we have your right phone number.  If we have a problem with your refill, we'll call you. If we call you, please call us back right away. If you don't, you may not get your medicines quickly.   Call your pharmacy to find out if your medicines are ready.   Keep your medicines in a safe place. Keep them away from pets and children. If your medicines are lost or stolen, we usually don't replace them. We recommend you file a police report if your medicines are stolen. Your insurance may not pay for early refills, even if you have a prescription.  Forms  Please give us at least 3 business days to fill out any forms. Bring the forms to your " visits if you can. We may refer you to other members of your care team to complete the forms.   Emergency care   Call 911 or go to the nearest emergency room if your life or someone else's life is in danger.  Call 988 anytime for the Suicide and Crisis Lifeline.  If you need care when we're closed, call your family doctor to see if they can help. You can also go to urgent care or an emergency room. [facility] emergency rooms may be able to give you buprenorphine or other medicine refills.  Thank you for choosing us for your care.  For informational purposes only. Not to replace the advice of your health care provider. Copyright   2023 [facility]. All rights reserved. [employer] [id number] - REV 05/23.

## 2024-01-29 NOTE — PROGRESS NOTES
[facility] Addiction Medicine    A/P                                                    ASSESSMENT/PLAN  1. Opioid use disorder, severe, dependence (H)  Troy continues to work on his recovery. He is still in IOP at [facility], which he will complete in the beginning of March. He has 6 months of abstinence.   Controlled with suboxone 32 ,mg TDD. Continue suboxone without changes.   Continue programming at [facility], encouraged plans to remain in sober living and transfer to Step down program offered at [facility]  Continue meetings attendance  Confirms narcan access.   - Drugs of Abuse Screen Urine (POC CUPS) POCT  - Buprenorphine HCl-Naloxone HCl (SUBOXONE) 12-3 MG FILM per film; Place 1 Film under the tongue 2 times daily  Dispense: 60 Film; Refill: 1  - buprenorphine HCl-naloxone HCl (SUBOXONE) 8-2 MG per film; Place 1 Film under the tongue daily  Dispense: 30 Film; Refill: 1    2. Alcohol use disorder, severe, dependence (H)  Denies cravings or return to use. Gabapentin  helps, refills provided.   Continue AA attendance  - gabapentin (NEURONTIN) 300 MG capsule; Take 3 capsules (900 mg) by mouth 3 times daily  Dispense: 270 capsule; Refill: 1    3. Anxiety  Symptoms controlled with gabapentin and clonidine, continue.   - gabapentin (NEURONTIN) 300 MG capsule; Take 3 capsules (900 mg) by mouth 3 times daily  Dispense: 270 capsule; Refill: 1  - cloNIDine (CATAPRES) 0.1 MG tablet; Take 1-2 tablets (0.1-0.2 mg) by mouth 3 times daily as needed (anxiety)  Dispense: 180 tablet; Refill: 1  - mirtazapine (REMERON) 15 MG tablet; Take 1 tablet (15 mg) by mouth at bedtime  Dispense: 30 tablet; Refill: 1    4. Therapeutic opioid-induced constipation (OIC)  Managed with miralax, refills provided.   - polyethylene glycol (MIRALAX) 17 GM/Dose powder; Take 17 g (1 Capful) by mouth 2 times daily as needed for constipation  Dispense: 578 g; Refill: 11    5. Primary insomnia  Sleeping well with clonidine and Remeron, continue.   -  mirtazapine (REMERON) 15 MG tablet; Take 1 tablet (15 mg) by mouth at bedtime  Dispense: 30 tablet; Refill: 1    No orders of the defined types were placed in this encounter.      Problem list updated Jan 29, 2024   No problems updated.          PDMP Review         Value Time User    State PDMP site checked  Yes 1/29/2024 12:36 PM Mandy Castillo, BELLE          01/03/2024 01/02/2024 5 Gabapentin 300 Mg Capsule 170.00 19 He Bat 3576 Equ (1193) 0/0  Comm Ins [place]   01/02/2024 01/02/2024 5 Suboxone 12 Mg-3 Mg Sl Film 60.00 30 He Bat             RTC  No follow-ups on file.      Counseled the patient on the importance of having a recovery program in addition to medication to manage recovery.  Components include avoiding isolating, having willingness to change, avoiding triggers and managing cravings. Encouraged having some type of sober network and practicing honesty with trusted support person(s). Encouraged other services such as counseling, 12 step or other self-help organizations.      Opioid warning reviewed.  Risk of overdose following a period of abstinence due to decrease tolerance was discussed including risk of death.  Strongly recommended abstain from alcohol, benzodiazepines, THC, opioids and other drugs of abuse.  Increased risk of return to opioid use after use of these substances discussed.  Increased risk of overdose/death with use of other substances particularly benzodiazepines/alcohol reviewed.        SUBJECTIVE                                                    CC/HPI:  Troy Mccoy is a 33 year old male with PMH  HX of hepatitis C s/p treatment with [facility] 2022, seizures related to alcohol withdrawal, chronic LBP, anxiety,  alcohol use disorder, methamphetamine use disorder, and opioid use disorder on buprenorphine who [facility] for [facility].      Brief History:  Troy Mccoy was first seen in [facility] on 07/13/23. They were referred by staff at [facility]  "to assist pt in starting buprenorphine.  He has been using heroin/fentnayl 0.5-1 gram daily, last use was 7/2/23. He has history of daily IV meth use, last use 2018, and history of drinking 1 gallon of vodka daily until 2018 when he was hospitalized and in ICU for seizures. He maintained 1 year of abstinence, and returned to alcohol use. He began using fentanyl/heroin IV Summer 2022,  and decreased his alcohol use to \"few drinks\", couple times per month. He entered residential treatment at [facility], was started on Suboxone at initial visit.   Returned to  9/12/23 after graduating from [facility].  Had been prescribed buprenorphine by Elías Read NP while in [facility], unable to continue after graduation due to distance to [place] for in person visits.  Started OP with Edith 9/2023.   10/10/23 buprenorphine increased to 32mg TDD.             Substance Use History :  Opioids:   Age at first use: 14-15 years.   Current use: substance: fentanyl, heroin; quantity 0.5-1 gram daily route: inhaled and IV; timing of last use: approx week of 7/2/223 ;                 IV drug use: Yes:   History of overdose: Yes: 12 times  Previous residential or outpatient treatments for addiction : Yes: [facility] 7/12/23  Previous medication treatments for addiction: No  Longest period of sobriety: 1 year, 2018  Medical complications related to substance use: no  Hepatitis C: Received treatment with [facility] 2022; 7/20/23 HCV RNA not detected  HIV: 7/20/23 HIV ag/ab nonreactive     Other Addiction History:  Stimulants   Meth IV, hx of daily use before 2018. Last use was in 2018  Sedatives/hypnotics/anxiolytics:   BDZ occasional  Alcohol:   Yes, HX of drinking 1 gallon a day of vodka until 2018. Since then drinking few drinks couple times monthly.   Nicotine:   vaping  Cannabis:   Yes, smokes couple of hits here and there.   Hallucinogens/Dissociatives:   Yes, none recently  Eating " disorder:  no  Gambling:              no        PAST PSYCHIATRIC HISTORY:  Diagnoses- anxiety  Suicide Attempts: Yes  approx 2021  Hospitalizations: Yes 2021     Social History  Housing status: sober house  Employment status: Unemployed, not seeking work  Relationship status: Single  Children: no children  Legal: none  Insurance needs: active  Contact information up to date? yes          Recent HPI Details:1/2/24  -transferring from , last visit 12/4/23  [facility]. Will complete programming in March  -Still attending meetings and has a mentor.   Started a new job as a  in [place]  Mood has been really good, Medications working well.   1. Opioid use disorder, severe, dependence (H)  Has 6 months of abstinence this week. Cravings are controlled with suboxone 32 mg TDD.   Is considering transferring to [facility].   Plan to continue suboxone without changes  [facility] and [facility] in [facility] living.   Continue meeting attendance.   Confirms narcan access.   - Buprenorphine HCl-Naloxone HCl (SUBOXONE) 12-3 MG FILM per film; Place 1 Film under the tongue 2 times daily  Dispense: 60 Film; Refill: 0  - buprenorphine HCl-naloxone HCl (SUBOXONE) 8-2 MG per film; Place 1 Film under the tongue daily  Dispense: 30 Film; Refill: 0     2. Alcohol use disorder, severe, dependence (H)  Denies cravings or return to use. Continue gabapentin 900 mg TID  - gabapentin (NEURONTIN) 300 MG capsule; Take 3 capsules (900 mg) by mouth 3 times daily  Dispense: 270 capsule; Refill: 0     3. Encounter for monitoring opioid maintenance therapy  - Drugs of Abuse Screen Urine (POC CUPS) POCT; Standing  - Drugs of Abuse Screen Urine (POC CUPS) POCT     4. Anxiety  Symptoms managed with clonidine and gabapentin, continue.   - cloNIDine (CATAPRES) 0.1 MG tablet; Take 1-2 tablets (0.1-0.2 mg) by mouth 3 times daily as needed (anxiety)  Dispense: 180 tablet; Refill: 0  - gabapentin  "(NEURONTIN) 300 MG capsule; Take 3 capsules (900 mg) by mouth 3 times daily  Dispense: 270 capsule; Refill: 0  - mirtazapine (REMERON) 15 MG tablet; Take 1 tablet (15 mg) by mouth at bedtime  Dispense: 30 tablet; Refill: 0     5. Primary insomnia  Sleeping well with mirtazapine and clonidine. Continue  - mirtazapine (REMERON) 15 MG tablet; Take 1 tablet (15 mg) by mouth at bedtime  Dispense: 30 tablet; Refill: 0    TODAY'S VISIT  HPI Jan 29, 2024    Still at sober house, plans to stay there for  while.   Will be completing IOP in March.   Going to meetings regularly. 2 meetings weekly, prefers [facility].   Still taking suboxone 32 mg TDD. Denies SE, constipation managed with miralax.   Still gabapentin 900 mg TID for anxiety  Describes mood as \"good\".   Feels ready to move on from IOP. Planning on doing the \"Step Down\" program  Went shopping and was able to buy new shoes and clothes for a change. Also enjoys disc golf.  Work is going, enjoys going to work and his coworkers. Is working as a  in [place].          12/4/2023    12:00 PM 1/2/2024    12:00 PM 1/29/2024    12:00 PM   PHQ   PHQ-9 Total Score 11 7 5   Q9: Thoughts of better off dead/self-harm past 2 weeks Not at all Not at all Not at all           OBJECTIVE                                                    PHYSICAL EXAM:  /71 (BP Location: Left arm, Patient Position: Sitting, Cuff Size: Adult Large)   Pulse 62   Ht 1.905 m (6' 3\")   Wt 147 kg (324 lb)   BMI 40.50 kg/m        Physical Exam  HENT:      Head: Normocephalic.      Nose: No congestion.   Eyes:      Conjunctiva/sclera: Conjunctivae normal.   Cardiovascular:      Rate and Rhythm: Normal rate.   Pulmonary:      Effort: Pulmonary effort is normal. No respiratory distress.   Neurological:      General: No focal deficit present.      Mental Status: He is alert and oriented to person, place, and time.      Coordination: Coordination is intact.      Gait: Gait is intact.   Psychiatric:    "      Attention and Perception: Attention normal.         Mood and Affect: Mood normal.         Speech: Speech normal.         Behavior: Behavior is cooperative.         Thought Content: Thought content normal. Thought content does not include suicidal ideation.         Judgment: Judgment normal.         LAB  Results for orders placed or performed in visit on 01/29/24   Drugs of Abuse Screen Urine (POC CUPS) POCT     Status: Abnormal   Result Value Ref Range    POCT Kit Lot Number [id number]     POCT Kit Expiration Date 2024-06-08     Temperature Urine POCT 94 F 90 F, 92 F, 94 F, 96 F, 98 F, 100 F    Specific [place] POCT 1.025 1.005, 1.015, 1.025    pH Qual Urine POCT 5 pH 4 pH, 5 pH, 7 pH, 9 pH    Creatinine Qual Urine POCT 50 mg/dL 20 mg/dL, 50 mg/dL, 100 mg/dL, 200 mg/dL    Internal QC Qual Urine POCT Valid Valid    Amphetamine Qual Urine POCT Negative Negative    Barbiturate Qual Urine POCT Negative Negative    Buprenorphine Qual Urine POCT Screen Positive (A) Negative    Benzodiazepine Qual Urine POCT Negative Negative    Cocaine Qual Urine POCT Negative Negative    Methamphetamine Qual Urine POCT Negative Negative    MDMA Qual Urine POCT Negative Negative    Methadone Qual Urine POCT Negative Negative    Opiate Qual Urine POCT Negative Negative    Oxycodone Qual Urine POCT Negative Negative    Phencyclidine Qual Urine POCT Negative Negative    THC Qual Urine POCT Negative Negative         HISTORY                                                    Problem list reviewed & adjusted, as indicated.  There is no problem list on file for this patient.        MEDICATION LIST (prior to visit)  Buprenorphine HCl-Naloxone HCl (SUBOXONE) 12-3 MG FILM per film, Place 1 Film under the tongue 2 times daily  buprenorphine HCl-naloxone HCl (SUBOXONE) 8-2 MG per film, Place 1 Film under the tongue daily  cloNIDine (CATAPRES) 0.1 MG tablet, Take 1-2 tablets (0.1-0.2 mg) by mouth 3 times daily as needed (anxiety)  gabapentin  (NEURONTIN) 300 MG capsule, Take 3 capsules (900 mg) by mouth 3 times daily  mirtazapine (REMERON) 15 MG tablet, Take 1 tablet (15 mg) by mouth at bedtime  polyethylene glycol (MIRALAX) 17 GM/Dose powder, Take 17 g (1 Capful) by mouth 2 times daily as needed for constipation  multivitamin w/minerals (MULTIVITAMINS W/MINERALS) tablet, Take 1 tablet by mouth daily (Patient not taking: Reported on 1/2/2024)  naloxone (NARCAN) 4 MG/0.1ML nasal spray, Spray 1 spray (4 mg) into one nostril alternating nostrils as needed every 2-3 minutes until assistance arrives  thiamine (B-1) 100 MG tablet, Take 1 tablet (100 mg) by mouth daily (Patient not taking: Reported on 1/2/2024)    No current facility-administered medications on file prior to visit.      MEDICATION LIST (after visit)  Current Outpatient Medications   Medication    Buprenorphine HCl-Naloxone HCl (SUBOXONE) 12-3 MG FILM per film    buprenorphine HCl-naloxone HCl (SUBOXONE) 8-2 MG per film    cloNIDine (CATAPRES) 0.1 MG tablet    gabapentin (NEURONTIN) 300 MG capsule    mirtazapine (REMERON) 15 MG tablet    polyethylene glycol (MIRALAX) 17 GM/Dose powder    multivitamin w/minerals (MULTIVITAMINS W/MINERALS) tablet    naloxone (NARCAN) 4 MG/0.1ML nasal spray    thiamine (B-1) 100 MG tablet     No current facility-administered medications for this visit.         Allergies   Allergen Reactions    Cephalosporins Hives    Cefaclor Rash         Mandy Castillo, POLINA,MSN, AGNP-C  [facility]   [address], [address]   [place] [address]   Phone # [phone]

## 2024-01-29 NOTE — PROGRESS NOTES
"[facility] Addiction Medicine       Rooming information: Recheck    Point of care urine drug screen positive for:  Lab Results   Component Value Date    BUP Screen Positive (A) 01/29/2024    BZO Negative 01/29/2024    BAR Negative 01/29/2024    DELROY Negative 01/29/2024    MAMP Negative 01/29/2024    AMP Negative 01/29/2024    MDMA Negative 01/29/2024    MTD Negative 01/29/2024    NQC395 Negative 01/29/2024    OXY Negative 01/29/2024    PCP Negative 01/29/2024    THC Negative 01/29/2024    TEMP 94 F 01/29/2024    SGPOCT 1.025 01/29/2024       *POC urine drug screen does not screen for Fentanyl    PHQ-9 Scores:       12/4/2023    12:00 PM 1/2/2024    12:00 PM 1/29/2024    12:00 PM   PHQ   PHQ-9 Total Score 11 7 5   Q9: Thoughts of better off dead/self-harm past 2 weeks Not at all Not at all Not at all     ZENIA-7 Scores:      1/2/2024    12:00 PM   ZENIA-7 SCORE   Total Score 5       Any other recent substance use:     Denies    NICOTINE-Yes: vape + cigs  If using nicotine, ready to quit? No    Side effects related to medications pt would like to discuss with provider (constipation, dry mouth, HA, GI upset, sedation?) No     Narcan currently available: Yes    Primary care provider: [facility] provider: \" soon \". Waiting when he is done with outpatient treatment   (follow up on  referral if needed)    Any housing, insurance deficits?: denies    Contact information up to date? yes    [employer] NA (please obtain LILIAN if pt would like to include)        Juliana Caraballo LPN  January 29, 2024  9:39 AM  "

## 2024-03-25 ENCOUNTER — VIRTUAL VISIT (OUTPATIENT)
Dept: ADDICTION MEDICINE | Facility: CLINIC | Age: 34
End: 2024-03-25
Payer: COMMERCIAL

## 2024-03-25 DIAGNOSIS — F51.01 PRIMARY INSOMNIA: ICD-10-CM

## 2024-03-25 DIAGNOSIS — F10.20 ALCOHOL USE DISORDER, SEVERE, DEPENDENCE (H): ICD-10-CM

## 2024-03-25 DIAGNOSIS — F11.20 OPIOID USE DISORDER, SEVERE, DEPENDENCE (H): Primary | ICD-10-CM

## 2024-03-25 DIAGNOSIS — F41.9 ANXIETY: ICD-10-CM

## 2024-03-25 PROCEDURE — G2211 COMPLEX E/M VISIT ADD ON: HCPCS | Mod: 95 | Performed by: NURSE PRACTITIONER

## 2024-03-25 PROCEDURE — 99214 OFFICE O/P EST MOD 30 MIN: CPT | Mod: 95 | Performed by: NURSE PRACTITIONER

## 2024-03-25 RX ORDER — ALBUTEROL SULFATE 0.83 MG/ML
2.5 SOLUTION RESPIRATORY (INHALATION)
Status: CANCELLED | OUTPATIENT
Start: 2024-03-25

## 2024-03-25 RX ORDER — ALBUTEROL SULFATE 90 UG/1
1-2 AEROSOL, METERED RESPIRATORY (INHALATION)
Status: CANCELLED
Start: 2024-03-25

## 2024-03-25 RX ORDER — BUPRENORPHINE AND NALOXONE 8; 2 MG/1; MG/1
1 FILM, SOLUBLE BUCCAL; SUBLINGUAL DAILY
Qty: 30 FILM | Refills: 1 | Status: SHIPPED | OUTPATIENT
Start: 2024-03-25

## 2024-03-25 RX ORDER — METHYLPREDNISOLONE SODIUM SUCCINATE 125 MG/2ML
125 INJECTION, POWDER, LYOPHILIZED, FOR SOLUTION INTRAMUSCULAR; INTRAVENOUS
Status: CANCELLED
Start: 2024-03-25

## 2024-03-25 RX ORDER — MIRTAZAPINE 15 MG/1
15 TABLET, FILM COATED ORAL AT BEDTIME
Qty: 30 TABLET | Refills: 1 | Status: SHIPPED | OUTPATIENT
Start: 2024-03-25 | End: 2024-05-13

## 2024-03-25 RX ORDER — BUPRENORPHINE AND NALOXONE 12; 3 MG/1; MG/1
1 FILM, SOLUBLE BUCCAL; SUBLINGUAL 2 TIMES DAILY
Qty: 60 FILM | Refills: 1 | Status: SHIPPED | OUTPATIENT
Start: 2024-03-25 | End: 2024-05-13

## 2024-03-25 RX ORDER — EPINEPHRINE 1 MG/ML
0.3 INJECTION, SOLUTION, CONCENTRATE INTRAVENOUS EVERY 5 MIN PRN
Status: CANCELLED | OUTPATIENT
Start: 2024-03-25

## 2024-03-25 RX ORDER — MEPERIDINE HYDROCHLORIDE 25 MG/ML
25 INJECTION INTRAMUSCULAR; INTRAVENOUS; SUBCUTANEOUS EVERY 30 MIN PRN
Status: CANCELLED | OUTPATIENT
Start: 2024-03-25

## 2024-03-25 RX ORDER — LIDOCAINE HYDROCHLORIDE 10 MG/ML
2 INJECTION, SOLUTION EPIDURAL; INFILTRATION; INTRACAUDAL; PERINEURAL ONCE
Status: CANCELLED | OUTPATIENT
Start: 2024-03-25 | End: 2024-03-25

## 2024-03-25 RX ORDER — DIPHENHYDRAMINE HYDROCHLORIDE 50 MG/ML
50 INJECTION INTRAMUSCULAR; INTRAVENOUS
Status: CANCELLED
Start: 2024-03-25

## 2024-03-25 RX ORDER — GABAPENTIN 300 MG/1
900 CAPSULE ORAL 3 TIMES DAILY
Qty: 270 CAPSULE | Refills: 1 | Status: SHIPPED | OUTPATIENT
Start: 2024-03-25 | End: 2024-05-13

## 2024-03-25 RX ORDER — CLONIDINE HYDROCHLORIDE 0.1 MG/1
.1-.2 TABLET ORAL 3 TIMES DAILY PRN
Qty: 180 TABLET | Refills: 1 | Status: SHIPPED | OUTPATIENT
Start: 2024-03-25 | End: 2024-05-13

## 2024-03-25 ASSESSMENT — ANXIETY QUESTIONNAIRES
GAD7 TOTAL SCORE: 4
IF YOU CHECKED OFF ANY PROBLEMS ON THIS QUESTIONNAIRE, HOW DIFFICULT HAVE THESE PROBLEMS MADE IT FOR YOU TO DO YOUR WORK, TAKE CARE OF THINGS AT HOME, OR GET ALONG WITH OTHER PEOPLE: NOT DIFFICULT AT ALL
1. FEELING NERVOUS, ANXIOUS, OR ON EDGE: SEVERAL DAYS
3. WORRYING TOO MUCH ABOUT DIFFERENT THINGS: SEVERAL DAYS
7. FEELING AFRAID AS IF SOMETHING AWFUL MIGHT HAPPEN: NOT AT ALL
7. FEELING AFRAID AS IF SOMETHING AWFUL MIGHT HAPPEN: NOT AT ALL
2. NOT BEING ABLE TO STOP OR CONTROL WORRYING: NOT AT ALL
4. TROUBLE RELAXING: SEVERAL DAYS
6. BECOMING EASILY ANNOYED OR IRRITABLE: SEVERAL DAYS
GAD7 TOTAL SCORE: 4
8. IF YOU CHECKED OFF ANY PROBLEMS, HOW DIFFICULT HAVE THESE MADE IT FOR YOU TO DO YOUR WORK, TAKE CARE OF THINGS AT HOME, OR GET ALONG WITH OTHER PEOPLE?: NOT DIFFICULT AT ALL
GAD7 TOTAL SCORE: 4
5. BEING SO RESTLESS THAT IT IS HARD TO SIT STILL: NOT AT ALL

## 2024-03-25 ASSESSMENT — PATIENT HEALTH QUESTIONNAIRE - PHQ9
SUM OF ALL RESPONSES TO PHQ QUESTIONS 1-9: 4
SUM OF ALL RESPONSES TO PHQ QUESTIONS 1-9: 4
10. IF YOU CHECKED OFF ANY PROBLEMS, HOW DIFFICULT HAVE THESE PROBLEMS MADE IT FOR YOU TO DO YOUR WORK, TAKE CARE OF THINGS AT HOME, OR GET ALONG WITH OTHER PEOPLE: NOT DIFFICULT AT ALL

## 2024-03-25 ASSESSMENT — PAIN SCALES - GENERAL: PAINLEVEL: NO PAIN (0)

## 2024-03-25 NOTE — PROGRESS NOTES
Virtual Visit Details    Type of service:  Video Visit   Video Start Time:  1057  Video End Time:11:22 AM    Originating Location (pt. Location): Home    Distant Location (provider location):  Off-site  Platform used for Video Visit: [employer] [facility]    A/P                                                    ASSESSMENT/PLAN  Diagnoses and all orders for this visit:  Opioid use disorder, severe, dependence (H)  Controlled with suboxone 32 mg Would like to transfer to sublocade in future.   Sublocade therapy plan ordered, first injection schedule 5/6.   Continue suboxone 32 mg TDD until 24 hours following the injection. Then may take 6-12 mg daily as needed.   Providing access to narcan  Continue recovery supports.   -     naloxone (NARCAN) 4 MG/0.1ML nasal spray; Spray 1 spray (4 mg) into one nostril alternating nostrils as needed for opioid reversal every 2-3 minutes until assistance arrives  -     Buprenorphine HCl-Naloxone HCl (SUBOXONE) 12-3 MG FILM per film; Place 1 Film under the tongue 2 times daily  -     buprenorphine HCl-naloxone HCl (SUBOXONE) 8-2 MG per film; Place 1 Film under the tongue daily  Anxiety  Primary insomnia  Sleeping well with mirtazapine, continue.   -     mirtazapine (REMERON) 15 MG tablet; Take 1 tablet (15 mg) by mouth at bedtime  Other orders  -     lidocaine (PF) (XYLOCAINE) 1 % injection 2 mL  -     buprenorphine ER (SUBLOCADE) syringe 300 mg  -     buprenorphine ER (SUBLOCADE) syringe 100 mg  -     diphenhydrAMINE (BENADRYL) injection 50 mg  -     famotidine (PEPCID) injection 20 mg  -     methylPREDNISolone sodium succinate (solu-MEDROL) injection 125 mg  -     EPINEPHrine PF (ADRENALIN) injection 0.3 mg  -     sodium chloride 0.9% BOLUS 500 mL  -     albuterol (PROVENTIL HFA/VENTOLIN HFA) inhaler  -     albuterol (PROVENTIL) neb solution 2.5 mg  -     meperidine (DEMEROL) injection 25 mg    Orders Placed This Encounter   Medications    DISCONTD:  naloxone (NARCAN) 4 MG/0.1ML nasal spray     Sig: Spray 1 spray (4 mg) into one nostril alternating nostrils as needed for opioid reversal every 2-3 minutes until assistance arrives     Dispense:  0.2 mL     Refill:  11    mirtazapine (REMERON) 15 MG tablet     Sig: Take 1 tablet (15 mg) by mouth at bedtime     Dispense:  30 tablet     Refill:  1    naloxone (NARCAN) 4 MG/0.1ML nasal spray     Sig: Spray 1 spray (4 mg) into one nostril alternating nostrils as needed for opioid reversal every 2-3 minutes until assistance arrives     Dispense:  0.2 mL     Refill:  11     Dispense as brand or generic based on insurance    Buprenorphine HCl-Naloxone HCl (SUBOXONE) 12-3 MG FILM per film     Sig: Place 1 Film under the tongue 2 times daily     Dispense:  60 Film     Refill:  1    buprenorphine HCl-naloxone HCl (SUBOXONE) 8-2 MG per film     Sig: Place 1 Film under the tongue daily     Dispense:  30 Film     Refill:  1       Problem list updated Mar 25, 2024   Problem   Opioid Use Disorder, Severe, Dependence (H)             PDMP Review         Value Time User    State PDMP site checked  Yes 3/25/2024 11:21 AM Mandy Castillo CNP              RTC  Return in about 7 weeks (around 5/13/2024) for Follow up, with me, in person 1pm.      Counseled the patient on the importance of having a recovery program in addition to medication to manage recovery.  Components include avoiding isolating, having willingness to change, avoiding triggers and managing cravings. Encouraged having some type of sober network and practicing honesty with trusted support person(s). Encouraged other services such as counseling, 12 step or other self-help organizations.      Opioid warning reviewed.  Risk of overdose following a period of abstinence due to decrease tolerance was discussed including risk of death.  Strongly recommended abstain from alcohol, benzodiazepines, THC, opioids and other drugs of abuse.  Increased risk of return to opioid use  "after use of these substances discussed.  Increased risk of overdose/death with use of other substances particularly benzodiazepines/alcohol reviewed.        SUBJECTIVE                                                    Troy Mccoy is a 33 year old male with PMH  HX of hepatitis C s/p treatment with [facility] 2022, seizures related to alcohol withdrawal, chronic LBP, anxiety,  alcohol use disorder, methamphetamine use disorder, and opioid use disorder on buprenorphine who [facility] the [facility] for return visit.      Brief History:  Troy Mccoy was first seen in [facility] Clinic on 07/13/23. They were referred by staff at [facility] to assist pt in starting buprenorphine.  He has been using heroin/fentnayl 0.5-1 gram daily, last use was 7/2/23. He has history of daily IV meth use, last use 2018, and history of drinking 1 gallon of vodka daily until 2018 when he was hospitalized and in ICU for seizures. He maintained 1 year of abstinence, and returned to alcohol use. He began using fentanyl/heroin IV Summer 2022,  and decreased his alcohol use to \"few drinks\", couple times per month. He entered residential treatment at [facility], was started on Suboxone at initial visit.   Returned to  9/12/23 after graduating from [facility].  Had been prescribed buprenorphine by Elías Read NP while in [facility], unable to continue after graduation due to distance to [facility] for in person visits.  Started OP with [facility] 9/2023.   10/10/23 buprenorphine increased to 32mg TDD.             Substance Use History :  Opioids:   Age at first use: 14-15 years.   Current use: substance: fentanyl, heroin; quantity 0.5-1 gram daily route: inhaled and IV; timing of last use: approx week of 7/2/223 ;                 IV drug use: Yes:   History of overdose: Yes: 12 times  Previous residential or outpatient treatments for addiction : Yes: [facility] 7/12/23  Previous medication treatments for " "addiction: No  Longest period of sobriety: 1 year, 2018  Medical complications related to substance use: no  Hepatitis C: Received treatment with [facility] 2022; 7/20/23 HCV RNA not detected  HIV: 7/20/23 HIV ag/ab nonreactive     Other Addiction History:  Stimulants   Meth IV, hx of daily use before 2018. Last use was in 2018  Sedatives/hypnotics/anxiolytics:   BDZ occasional  Alcohol:   Yes, HX of drinking 1 gallon a day of vodka until 2018. Since then drinking few drinks couple times monthly.   Nicotine:   vaping  Cannabis:   Yes, smokes couple of hits here and there.   Hallucinogens/Dissociatives:   Yes, none recently  Eating disorder:  no  Gambling:              no        PAST PSYCHIATRIC HISTORY:  Diagnoses- anxiety  Suicide Attempts: Yes  approx 2021  Hospitalizations: Yes 2021     Social History  Housing status: sober house  Employment status: Unemployed, not seeking work  Relationship status: Single  Children: no children  Legal: none  Insurance needs: active  Contact information up to date? yes          Recent HPI Details:1/29/24  Still at [facility], plans to stay there for  while.   Will be completing IOP in March.   Going to meetings regularly. 2 meetings weekly, prefers [facility].   Still taking suboxone 32 mg TDD. Denies SE, constipation managed with miralax.   Still gabapentin 900 mg TID for anxiety  Describes mood as \"good\".   Feels ready to move on from [facility]. Planning on doing the \"Step Down\" program  Went shopping and was able to buy new shoes and clothes for a change. Also enjoys disc golf.  Work is going, enjoys going to work and his coworkers. Is working as a  in [place].      1. Opioid use disorder, severe, dependence (H)  Troy continues to work on his recovery. He is still in IOP at [facility], which he will complete in the beginning of March. He has 6 months of abstinence.   Controlled with suboxone 32 ,mg TDD. Continue suboxone without changes.   Continue programming at [facility], encouraged " plans to remain in sober living and transfer to Step down program offered at [facility]  Continue meetings attendance  Confirms narcan access.   - Drugs of Abuse Screen Urine (POC CUPS) POCT  - Buprenorphine HCl-Naloxone HCl (SUBOXONE) 12-3 MG FILM per film; Place 1 Film under the tongue 2 times daily  Dispense: 60 Film; Refill: 1  - buprenorphine HCl-naloxone HCl (SUBOXONE) 8-2 MG per film; Place 1 Film under the tongue daily  Dispense: 30 Film; Refill: 1     2. Alcohol use disorder, severe, dependence (H)  Denies cravings or return to use. Gabapentin  helps, refills provided.   Continue AA attendance  - gabapentin (NEURONTIN) 300 MG capsule; Take 3 capsules (900 mg) by mouth 3 times daily  Dispense: 270 capsule; Refill: 1     3. Anxiety  Symptoms controlled with gabapentin and clonidine, continue.   - gabapentin (NEURONTIN) 300 MG capsule; Take 3 capsules (900 mg) by mouth 3 times daily  Dispense: 270 capsule; Refill: 1  - cloNIDine (CATAPRES) 0.1 MG tablet; Take 1-2 tablets (0.1-0.2 mg) by mouth 3 times daily as needed (anxiety)  Dispense: 180 tablet; Refill: 1  - mirtazapine (REMERON) 15 MG tablet; Take 1 tablet (15 mg) by mouth at bedtime  Dispense: 30 tablet; Refill: 1     4. Therapeutic opioid-induced constipation (OIC)  Managed with miralax, refills provided.   - polyethylene glycol (MIRALAX) 17 GM/Dose powder; Take 17 g (1 Capful) by mouth 2 times daily as needed for constipation  Dispense: 578 g; Refill: 11     5. Primary insomnia  Sleeping well with clonidine and Remeron, continue.   - mirtazapine (REMERON) 15 MG tablet; Take 1 tablet (15 mg) by mouth at bedtime  Dispense: 30 tablet; Refill: 1       TODAY'S VISIT  HPI Mar 25, 2024  -Graduated from IOP last month. Is now doing aftercare thru [facility] 3 days per week.   -Going to his meetings weekly.   Work is going good.   No medications changes  Denies cravings or urges, suboxone 32 mg. Wants to transfer to sublocade  Constipation is managed with miralax.            1/2/2024    12:00 PM 1/29/2024    12:00 PM 3/25/2024    10:51 AM   PHQ   PHQ-9 Total Score 7 5 4   Q9: Thoughts of better off dead/self-harm past 2 weeks Not at all Not at all Not at all       OBJECTIVE                                                    PHYSICAL EXAM:  There were no vitals taken for this visit.      Physical Exam  Pulmonary:      Effort: Pulmonary effort is normal. No respiratory distress.   Neurological:      General: No focal deficit present.      Mental Status: He is alert and oriented to person, place, and time.   Psychiatric:         Attention and Perception: Attention normal.         Mood and Affect: Mood normal.         Speech: Speech normal.         Behavior: Behavior is cooperative.         Thought Content: Thought content normal. Thought content does not include suicidal ideation.         Judgment: Judgment normal.         LAB      HISTORY                                                    Problem list reviewed & adjusted, as indicated.  Patient Active Problem List   Diagnosis    Opioid use disorder, severe, dependence (H)         MEDICATION LIST (prior to visit)  cloNIDine (CATAPRES) 0.1 MG tablet, Take 1-2 tablets (0.1-0.2 mg) by mouth 3 times daily as needed (anxiety)  gabapentin (NEURONTIN) 300 MG capsule, Take 3 capsules (900 mg) by mouth 3 times daily  polyethylene glycol (MIRALAX) 17 GM/Dose powder, Take 17 g (1 Capful) by mouth 2 times daily as needed for constipation  multivitamin w/minerals (MULTIVITAMINS W/MINERALS) tablet, Take 1 tablet by mouth daily (Patient not taking: Reported on 1/2/2024)  thiamine (B-1) 100 MG tablet, Take 1 tablet (100 mg) by mouth daily (Patient not taking: Reported on 1/2/2024)    No current facility-administered medications on file prior to visit.      MEDICATION LIST (after visit)  Current Outpatient Medications   Medication    Buprenorphine HCl-Naloxone HCl (SUBOXONE) 12-3 MG FILM per film    buprenorphine HCl-naloxone HCl (SUBOXONE) 8-2  MG per film    cloNIDine (CATAPRES) 0.1 MG tablet    gabapentin (NEURONTIN) 300 MG capsule    mirtazapine (REMERON) 15 MG tablet    naloxone (NARCAN) 4 MG/0.1ML nasal spray    polyethylene glycol (MIRALAX) 17 GM/Dose powder    multivitamin w/minerals (MULTIVITAMINS W/MINERALS) tablet    thiamine (B-1) 100 MG tablet     No current facility-administered medications for this visit.         Allergies   Allergen Reactions    Cephalosporins Hives    Cefaclor Rash        Continued Complex Management  The longitudinal plan of care for Opioid Use Disorder (OUD) was addressed during this visit. Due to the added complexity in care, I will continue to support Troy in the subsequent management and with ongoing continuity of care.          Mandy Castillo, POLINA,MSN, AGNP-C  [facility]   [address] , [address] [place] [id number]   Phone # [phone]

## 2024-03-25 NOTE — PATIENT INSTRUCTIONS
Continue suboxone 12 mg Bid and 8 mg midday until 24 hours after sublocade. Then may take 6-12 mg daily as needed for cravings or withdrawal.     Injection is scheduled for 5/6 at 1230 pm at [facility].

## 2024-03-25 NOTE — NURSING NOTE
Is the patient currently in the state of [place]? YES    Visit mode:VIDEO    If the visit is dropped, the patient can be reconnected by: VIDEO VISIT: Text to cell phone:   Telephone Information:   Mobile [phone]       Will anyone else be joining the visit? NO  (If patient encounters technical issues they should call [phone]    How would you like to obtain your AVS? MyChart  Patient will get their mychart up and running    Are changes needed to the allergy or medication list? Pt stated no changes to allergies and Pt stated no med changes  Patient asked for a can of Narcan nasal spray    Reason for visit: CASEY RAMÍREZF

## 2024-04-14 ENCOUNTER — HEALTH MAINTENANCE LETTER (OUTPATIENT)
Age: 34
End: 2024-04-14

## 2024-05-06 ENCOUNTER — INFUSION THERAPY VISIT (OUTPATIENT)
Dept: INFUSION THERAPY | Facility: CLINIC | Age: 34
End: 2024-05-06
Attending: NURSE PRACTITIONER
Payer: COMMERCIAL

## 2024-05-06 VITALS — HEART RATE: 80 BPM | TEMPERATURE: 98.6 F | SYSTOLIC BLOOD PRESSURE: 123 MMHG | DIASTOLIC BLOOD PRESSURE: 69 MMHG

## 2024-05-06 DIAGNOSIS — F11.20 OPIOID USE DISORDER, SEVERE, DEPENDENCE (H): Primary | ICD-10-CM

## 2024-05-06 PROCEDURE — 250N000011 HC RX IP 250 OP 636: Mod: JZ | Performed by: NURSE PRACTITIONER

## 2024-05-06 PROCEDURE — 96372 THER/PROPH/DIAG INJ SC/IM: CPT | Performed by: NURSE PRACTITIONER

## 2024-05-06 PROCEDURE — 250N000009 HC RX 250: Performed by: NURSE PRACTITIONER

## 2024-05-06 RX ORDER — EPINEPHRINE 1 MG/ML
0.3 INJECTION, SOLUTION, CONCENTRATE INTRAVENOUS EVERY 5 MIN PRN
Status: CANCELLED | OUTPATIENT
Start: 2024-06-03

## 2024-05-06 RX ORDER — LIDOCAINE HYDROCHLORIDE 10 MG/ML
2 INJECTION, SOLUTION EPIDURAL; INFILTRATION; INTRACAUDAL; PERINEURAL ONCE
Status: COMPLETED | OUTPATIENT
Start: 2024-05-06 | End: 2024-05-06

## 2024-05-06 RX ORDER — LIDOCAINE HYDROCHLORIDE 10 MG/ML
2 INJECTION, SOLUTION EPIDURAL; INFILTRATION; INTRACAUDAL; PERINEURAL ONCE
Status: CANCELLED | OUTPATIENT
Start: 2024-06-03 | End: 2024-06-03

## 2024-05-06 RX ORDER — DIPHENHYDRAMINE HYDROCHLORIDE 50 MG/ML
50 INJECTION INTRAMUSCULAR; INTRAVENOUS
Status: CANCELLED
Start: 2024-06-03

## 2024-05-06 RX ORDER — METHYLPREDNISOLONE SODIUM SUCCINATE 125 MG/2ML
125 INJECTION, POWDER, LYOPHILIZED, FOR SOLUTION INTRAMUSCULAR; INTRAVENOUS
Status: CANCELLED
Start: 2024-06-03

## 2024-05-06 RX ORDER — MEPERIDINE HYDROCHLORIDE 25 MG/ML
25 INJECTION INTRAMUSCULAR; INTRAVENOUS; SUBCUTANEOUS EVERY 30 MIN PRN
Status: CANCELLED | OUTPATIENT
Start: 2024-06-03

## 2024-05-06 RX ORDER — ALBUTEROL SULFATE 90 UG/1
1-2 AEROSOL, METERED RESPIRATORY (INHALATION)
Status: CANCELLED
Start: 2024-06-03

## 2024-05-06 RX ORDER — ALBUTEROL SULFATE 0.83 MG/ML
2.5 SOLUTION RESPIRATORY (INHALATION)
Status: CANCELLED | OUTPATIENT
Start: 2024-06-03

## 2024-05-06 RX ADMIN — BUPRENORPHINE 300 MG: 300 SOLUTION SUBCUTANEOUS at 12:47

## 2024-05-06 RX ADMIN — LIDOCAINE HYDROCHLORIDE 2 ML: 10 INJECTION, SOLUTION EPIDURAL; INFILTRATION; INTRACAUDAL; PERINEURAL at 12:41

## 2024-05-06 ASSESSMENT — PAIN SCALES - GENERAL: PAINLEVEL: NO PAIN (0)

## 2024-05-06 NOTE — PROGRESS NOTES
Infusion Nursing Note:  Troy Mccoy presents today for sublocade injection (first dose).    Patient seen by provider today: No   present during visit today: Not Applicable.    Note: N/A.      Intravenous Access:  No Intravenous access/labs at this visit.    Treatment Conditions:  Denies relapse.  States he is taking suboxone as directed.      Post Infusion Assessment:  Patient tolerated injection without incident.  Given in LLQ after 2ml subcutaneous xylocaine administered for local anesthesia.       Discharge Plan:   Discharge instructions reviewed with: Patient.  Reviewed and provided with printed info and wallet card for sublocade.  Patient discharged in stable condition accompanied by: self.  Departure Mode: Ambulatory.  RTC 6/3/24.  Reviewed upcoming appointment with provider.

## 2024-05-12 DIAGNOSIS — F51.01 PRIMARY INSOMNIA: ICD-10-CM

## 2024-05-12 DIAGNOSIS — F10.20 ALCOHOL USE DISORDER, SEVERE, DEPENDENCE (H): ICD-10-CM

## 2024-05-12 DIAGNOSIS — F41.9 ANXIETY: ICD-10-CM

## 2024-05-12 DIAGNOSIS — F11.20 OPIOID USE DISORDER, SEVERE, DEPENDENCE (H): ICD-10-CM

## 2024-05-13 ENCOUNTER — OFFICE VISIT (OUTPATIENT)
Dept: ADDICTION MEDICINE | Facility: CLINIC | Age: 34
End: 2024-05-13
Payer: COMMERCIAL

## 2024-05-13 VITALS
OXYGEN SATURATION: 97 % | HEART RATE: 49 BPM | WEIGHT: 315 LBS | RESPIRATION RATE: 16 BRPM | TEMPERATURE: 98.1 F | BODY MASS INDEX: 39.17 KG/M2 | HEIGHT: 75 IN | SYSTOLIC BLOOD PRESSURE: 140 MMHG | DIASTOLIC BLOOD PRESSURE: 70 MMHG

## 2024-05-13 DIAGNOSIS — F41.9 ANXIETY: ICD-10-CM

## 2024-05-13 DIAGNOSIS — Z79.891 ENCOUNTER FOR MONITORING OPIOID MAINTENANCE THERAPY: ICD-10-CM

## 2024-05-13 DIAGNOSIS — T40.2X5A THERAPEUTIC OPIOID-INDUCED CONSTIPATION (OIC): ICD-10-CM

## 2024-05-13 DIAGNOSIS — F11.20 OPIOID USE DISORDER, SEVERE, DEPENDENCE (H): Primary | ICD-10-CM

## 2024-05-13 DIAGNOSIS — K59.03 THERAPEUTIC OPIOID-INDUCED CONSTIPATION (OIC): ICD-10-CM

## 2024-05-13 DIAGNOSIS — F10.20 ALCOHOL USE DISORDER, SEVERE, DEPENDENCE (H): ICD-10-CM

## 2024-05-13 DIAGNOSIS — Z51.81 ENCOUNTER FOR MONITORING OPIOID MAINTENANCE THERAPY: ICD-10-CM

## 2024-05-13 PROCEDURE — 99214 OFFICE O/P EST MOD 30 MIN: CPT | Performed by: NURSE PRACTITIONER

## 2024-05-13 PROCEDURE — G2211 COMPLEX E/M VISIT ADD ON: HCPCS | Performed by: NURSE PRACTITIONER

## 2024-05-13 PROCEDURE — 80305 DRUG TEST PRSMV DIR OPT OBS: CPT | Performed by: NURSE PRACTITIONER

## 2024-05-13 RX ORDER — BUPRENORPHINE HYDROCHLORIDE, NALOXONE HYDROCHLORIDE 8; 2 MG/1; MG/1
FILM, SOLUBLE BUCCAL; SUBLINGUAL
Qty: 30 FILM | Refills: 1 | OUTPATIENT
Start: 2024-05-13

## 2024-05-13 RX ORDER — BUPRENORPHINE AND NALOXONE 12; 3 MG/1; MG/1
1 FILM, SOLUBLE BUCCAL; SUBLINGUAL DAILY PRN
Qty: 30 FILM | Refills: 1 | Status: SHIPPED | OUTPATIENT
Start: 2024-05-20 | End: 2024-05-13

## 2024-05-13 RX ORDER — GABAPENTIN 300 MG/1
CAPSULE ORAL
Qty: 270 CAPSULE | Refills: 1 | OUTPATIENT
Start: 2024-05-13

## 2024-05-13 RX ORDER — BUPRENORPHINE HYDROCHLORIDE, NALOXONE HYDROCHLORIDE 12; 3 MG/1; MG/1
FILM, SOLUBLE BUCCAL; SUBLINGUAL
Qty: 60 FILM | Refills: 1 | OUTPATIENT
Start: 2024-05-13

## 2024-05-13 RX ORDER — MIRTAZAPINE 15 MG/1
15 TABLET, FILM COATED ORAL AT BEDTIME
Qty: 30 TABLET | Refills: 1 | Status: SHIPPED | OUTPATIENT
Start: 2024-05-13 | End: 2024-06-28

## 2024-05-13 RX ORDER — GABAPENTIN 300 MG/1
900 CAPSULE ORAL 3 TIMES DAILY
Qty: 360 CAPSULE | Refills: 1 | Status: SHIPPED | OUTPATIENT
Start: 2024-05-13 | End: 2024-06-28

## 2024-05-13 RX ORDER — CLONIDINE HYDROCHLORIDE 0.1 MG/1
0.1-0.2 TABLET ORAL 3 TIMES DAILY PRN
Qty: 180 TABLET | Refills: 1 | OUTPATIENT
Start: 2024-05-13

## 2024-05-13 RX ORDER — BUPRENORPHINE AND NALOXONE 12; 3 MG/1; MG/1
1 FILM, SOLUBLE BUCCAL; SUBLINGUAL DAILY PRN
Qty: 30 FILM | Refills: 1 | Status: SHIPPED | OUTPATIENT
Start: 2024-05-20 | End: 2024-05-17

## 2024-05-13 RX ORDER — CLONIDINE HYDROCHLORIDE 0.1 MG/1
.1-.2 TABLET ORAL 3 TIMES DAILY PRN
Qty: 180 TABLET | Refills: 1 | Status: SHIPPED | OUTPATIENT
Start: 2024-05-13 | End: 2024-06-28

## 2024-05-13 ASSESSMENT — ANXIETY QUESTIONNAIRES
4. TROUBLE RELAXING: MORE THAN HALF THE DAYS
GAD7 TOTAL SCORE: 10
5. BEING SO RESTLESS THAT IT IS HARD TO SIT STILL: SEVERAL DAYS
GAD7 TOTAL SCORE: 10
7. FEELING AFRAID AS IF SOMETHING AWFUL MIGHT HAPPEN: NOT AT ALL
3. WORRYING TOO MUCH ABOUT DIFFERENT THINGS: NOT AT ALL
GAD7 TOTAL SCORE: 10
2. NOT BEING ABLE TO STOP OR CONTROL WORRYING: MORE THAN HALF THE DAYS
1. FEELING NERVOUS, ANXIOUS, OR ON EDGE: MORE THAN HALF THE DAYS
7. FEELING AFRAID AS IF SOMETHING AWFUL MIGHT HAPPEN: NOT AT ALL
6. BECOMING EASILY ANNOYED OR IRRITABLE: NEARLY EVERY DAY

## 2024-05-13 ASSESSMENT — PATIENT HEALTH QUESTIONNAIRE - PHQ9
SUM OF ALL RESPONSES TO PHQ QUESTIONS 1-9: 7
SUM OF ALL RESPONSES TO PHQ QUESTIONS 1-9: 7
10. IF YOU CHECKED OFF ANY PROBLEMS, HOW DIFFICULT HAVE THESE PROBLEMS MADE IT FOR YOU TO DO YOUR WORK, TAKE CARE OF THINGS AT HOME, OR GET ALONG WITH OTHER PEOPLE: SOMEWHAT DIFFICULT

## 2024-05-13 ASSESSMENT — PAIN SCALES - GENERAL: PAINLEVEL: NO PAIN (0)

## 2024-05-13 NOTE — TELEPHONE ENCOUNTER
Patient seen by provider today for a visit. Routing Mirtazapine to provider for review.    Heidy Negron RN on 5/13/2024 at 4:12 PM

## 2024-05-13 NOTE — PROGRESS NOTES
[facility] - Addiction Medicine       Rooming information:    Point of care urine drug screen positive for:  Lab Results   Component Value Date    BUP Screen Positive (A) 05/13/2024    BZO Negative 05/13/2024    BAR Negative 05/13/2024    DELROY Negative 05/13/2024    MAMP Negative 05/13/2024    AMP Negative 05/13/2024    MDMA Negative 05/13/2024    MTD Negative 05/13/2024    ZDK558 Negative 05/13/2024    OXY Negative 05/13/2024    PCP Negative 05/13/2024    THC Negative 05/13/2024    TEMP 92 F 05/13/2024    SGPOCT 1.015 05/13/2024       *POC urine drug screen does not screen for Fentanyl    PHQ-9 Scores:       1/29/2024    12:00 PM 3/25/2024    10:51 AM 5/13/2024     1:31 PM   PHQ   PHQ-9 Total Score 5 4 7   Q9: Thoughts of better off dead/self-harm past 2 weeks Not at all Not at all Not at all     ZENIA-7 Scores:      1/29/2024    12:00 PM 3/25/2024    10:52 AM 5/13/2024     1:31 PM   ZENIA-7 SCORE   Total Score  4 (minimal anxiety) 10 (moderate anxiety)   Total Score 4 4 10     BP machine was not able to read pt's blood pressure on either bicep. RN was able to get BP on R forearm while RN elevated pt's arm to heart level. 140/70. Pt's pulse resulted as 50 on vitals machine. RN checked manually and pulse was 49. Pt denies feeling dizzy or light-headed. Provider notified.     Rooming aside from UDS and vitals was completed during Office Visit per provider request.     Ayaka Henderson RN on 5/13/2024 at 1:31 PM

## 2024-05-13 NOTE — PROGRESS NOTES
[facility]    A/P                                                    ASSESSMENT/PLAN  1. Opioid use disorder, severe, dependence (H)  Needs improvement. Is experiencing withdrawal symptoms since he received his first sublocade last week. He has been taking additional 12-16 mg daily to treat his symptoms but reports his reluctance in taking suboxone. Explained that since he was taking 32 mg of suboxone prior to getting the injection, it is expected that he might need additional suboxone to treat his symptoms and encouraged continued use.   Providing 12 mg suboxone films for him to take 1-1.5 films daily as needed.   Continue IOP through [facility] and meeting attendance.   Has narcan  - Buprenorphine HCl-Naloxone HCl (SUBOXONE) 12-3 MG FILM per film; Place 1 Film under the tongue daily as needed (withdrawal cravings)  Dispense: 30 Film; Refill: 1    2. Alcohol use disorder, severe, dependence (H)  Denies cravings or return to use, continue gabapentin.   Continue programming at [facility] and meeting attendance  - gabapentin (NEURONTIN) 300 MG capsule; Take 3 capsules (900 mg) by mouth 3 times daily May take an additional 900 mg as needed for anxiety  Dispense: 360 capsule; Refill: 1    3. Anxiety  Reports increased anxiety and insomnia related to opiate withdrawal, requested an additional dose of gabapentin and clonidine.   May take an additional 900 mg of gabapentin  Declined increase to clonidine today, continue clonidine unchanged. Is experience some bradycardia today which is unusual for him. Is asymptomatic.   - gabapentin (NEURONTIN) 300 MG capsule; Take 3 capsules (900 mg) by mouth 3 times daily May take an additional 900 mg as needed for anxiety  Dispense: 360 capsule; Refill: 1  - cloNIDine (CATAPRES) 0.1 MG tablet; Take 1-2 tablets (0.1-0.2 mg) by mouth 3 times daily as needed (anxiety)  Dispense: 180 tablet; Refill: 1    4. Encounter for monitoring opioid maintenance therapy  - Drugs of  Abuse Screen Urine (POC CUPS) POCT    5. Therapeutic opioid-induced constipation (OIC)  Is experiencing more constipation since getting sublocade, is taking miralax and fiber daily. Providing fiber refill today.   - Fiber 500 MG CAPS; Take 2 capsules by mouth daily  Dispense: 60 capsule; Refill: 1    Orders Placed This Encounter   Medications    DISCONTD: Buprenorphine HCl-Naloxone HCl (SUBOXONE) 12-3 MG FILM per film     Sig: Place 1 Film under the tongue daily as needed (withdrawal cravings)     Dispense:  30 Film     Refill:  1    gabapentin (NEURONTIN) 300 MG capsule     Sig: Take 3 capsules (900 mg) by mouth 3 times daily May take an additional 900 mg as needed for anxiety     Dispense:  360 capsule     Refill:  1    cloNIDine (CATAPRES) 0.1 MG tablet     Sig: Take 1-2 tablets (0.1-0.2 mg) by mouth 3 times daily as needed (anxiety)     Dispense:  180 tablet     Refill:  1    Fiber 500 MG CAPS     Sig: Take 2 capsules by mouth daily     Dispense:  60 capsule     Refill:  1    Buprenorphine HCl-Naloxone HCl (SUBOXONE) 12-3 MG FILM per film     Sig: Place 1 Film under the tongue daily as needed (withdrawal cravings)     Dispense:  30 Film     Refill:  1     Dispense as brand or generic based on insurance       Problem list updated May 13, 2024   No problems updated.          PDMP Review         Value Time User    State PDMP site checked  Yes 5/13/2024 12:54 PM Mandy Castillo CNP          04/22/2024 03/25/2024 4 Suboxone 12 Mg-3 Mg Sl Film 60.00 30 He Bat 8395 Equ (1193) 1/1 24.00 mg [place] Ins [place]   04/22/2024 03/25/2024 4 Suboxone 8 Mg-2 Mg Sl Film 30.00 30 He Bat 8394 Equ (1193) 1/1 8.00 mg Comm Ins        RTC  Return in about 5 weeks (around 6/18/2024) for Follow up, using a video visit at 1100 am.      Counseled the patient on the importance of having a recovery program in addition to medication to manage recovery.  Components include avoiding isolating, having willingness to change, avoiding triggers and  "managing cravings. Encouraged having some type of sober network and practicing honesty with trusted support person(s). Encouraged other services such as counseling, 12 step or other self-help organizations.      Opioid warning reviewed.  Risk of overdose following a period of abstinence due to decrease tolerance was discussed including risk of death.  Strongly recommended abstain from alcohol, benzodiazepines, THC, opioids and other drugs of abuse.  Increased risk of return to opioid use after use of these substances discussed.  Increased risk of overdose/death with use of other substances particularly benzodiazepines/alcohol reviewed.        SUBJECTIVE                                                    Troy Mccoy is a 33 year old male with PMH  HX of hepatitis C s/p treatment with [facility] 2022, seizures related to alcohol withdrawal, chronic LBP, anxiety,  alcohol use disorder, methamphetamine use disorder, and opioid use disorder on buprenorphine who [facility] for return visit.      Brief History:  Troy Mccoy was first seen in [facility] Clinic on 07/13/23. They were referred by staff at [facility] to assist pt in starting buprenorphine.  He has been using heroin/fentnayl 0.5-1 gram daily, last use was 7/2/23. He has history of daily IV meth use, last use 2018, and history of drinking 1 gallon of vodka daily until 2018 when he was hospitalized and in ICU for seizures. He maintained 1 year of abstinence, and returned to alcohol use. He began using fentanyl/heroin IV Summer 2022,  and decreased his alcohol use to \"few drinks\", couple times per month. He entered residential treatment at [facility], was started on Suboxone at initial visit.   Returned to  9/12/23 after graduating from [facility].  Had been prescribed buprenorphine by Elías Read NP while in [facility], unable to continue after graduation due to distance to [place] for in person visits.  " Started OP with [facility] 9/2023.   10/10/23 buprenorphine increased to 32mg TDD.   [facility]            Substance Use History :  Opioids:   Age at first use: 14-15 years.   Current use: substance: fentanyl, heroin; quantity 0.5-1 gram daily route: inhaled and IV; timing of last use: approx week of 7/2/223 ;                 IV drug use: Yes:   History of overdose: Yes: 12 times  Previous residential or outpatient treatments for addiction : Yes: [facility] 7/12/23  Previous medication treatments for addiction: No  Longest period of sobriety: 1 year, 2018  Medical complications related to substance use: no  Hepatitis C: Received treatment with [facility] 2022; 7/20/23 HCV RNA not detected  HIV: 7/20/23 HIV ag/ab nonreactive     Other Addiction History:  Stimulants   Meth IV, hx of daily use before 2018. Last use was in 2018  Sedatives/hypnotics/anxiolytics:   BDZ occasional  Alcohol:   Yes, HX of drinking 1 gallon a day of vodka until 2018. Since then drinking few drinks couple times monthly.   Nicotine:   vaping  Cannabis:   Yes, smokes couple of hits here and there.   Hallucinogens/Dissociatives:   Yes, none recently  Eating disorder:  no  Gambling:              no        PAST PSYCHIATRIC HISTORY:  Diagnoses- anxiety  Suicide Attempts: Yes  approx 2021  Hospitalizations: Yes 2021     Social History  Housing status: sober house  Employment status: Unemployed, not seeking work  Relationship status: Single  Children: no children  Legal: none  Insurance needs: active  Contact information up to date? yes       Recent HPI Details:3/25/2024  -Graduated from [facility] last month. Is now doing aftercare thru [facility] 3 days per week.   -Going to his meetings weekly.   Work is going good.   No medications changes  Denies cravings or urges, suboxone 32 mg. Wants to transfer to [facility]  Constipation is managed with miralax.   Opioid use disorder, severe, dependence (H)  Controlled with suboxone 32  mg Would like to transfer to sublocade in future.   Sublocade therapy plan ordered, first injection schedule 5/6.   Continue suboxone 32 mg TDD until 24 hours following the injection. Then may take 6-12 mg daily as needed.   Providing access to narcan  Continue recovery supports.   -     naloxone (NARCAN) 4 MG/0.1ML nasal spray; Spray 1 spray (4 mg) into one nostril alternating nostrils as needed for opioid reversal every 2-3 minutes until assistance arrives  -     Buprenorphine HCl-Naloxone HCl (SUBOXONE) 12-3 MG FILM per film; Place 1 Film under the tongue 2 times daily  -     buprenorphine HCl-naloxone HCl (SUBOXONE) 8-2 MG per film; Place 1 Film under the tongue daily  Anxiety  Primary insomnia  Sleeping well with mirtazapine, continue.   -     mirtazapine (REMERON) 15 MG tablet; Take 1 tablet (15 mg) by mouth at bedtime  Other orders  -     lidocaine (PF) (XYLOCAINE) 1 % injection 2 mL  -     buprenorphine ER (SUBLOCADE) syringe 300 mg  -     buprenorphine ER (SUBLOCADE) syringe 100 mg  -     diphenhydrAMINE (BENADRYL) injection 50 mg  -     famotidine (PEPCID) injection 20 mg  -     methylPREDNISolone sodium succinate (solu-MEDROL) injection 125 mg  -     EPINEPHrine PF (ADRENALIN) injection 0.3 mg  -     sodium chloride 0.9% BOLUS 500 mL  -     albuterol (PROVENTIL HFA/VENTOLIN HFA) inhaler  -     albuterol (PROVENTIL) neb solution 2.5 mg  -     meperidine (DEMEROL) injection 25 mg    TODAY'S VISIT  HPI May 13, 2024  Received his first sublocade last week . Injection went well, did not hurt at all. No injection site irritation.   Has been experiencing withdrawal since getting his sublocade last week. Mainly mild irritability, abdominal cramps, insomnia. Tried going without taking additional suboxone but has needed to take 12-16 mg daily to treat symptoms.    Some increased constipation since getting sublocade  Wants additional gabapentin and clonidine to take at bedtime to help him sleep.           1/29/2024  "   12:00 PM 3/25/2024    10:51 AM 5/13/2024     1:31 PM   PHQ   PHQ-9 Total Score 5 4 7   Q9: Thoughts of better off dead/self-harm past 2 weeks Not at all Not at all Not at all       OBJECTIVE                                                    PHYSICAL EXAM:  BP (!) 140/70 (BP Location: Other (Comment), Patient Position: Sitting, Cuff Size: Adult Regular)   Pulse (!) 49   Temp 98.1  F (36.7  C) (Oral)   Resp 16   Ht 1.905 m (6' 3\")   Wt 148 kg (326 lb 4 oz)   SpO2 97%   BMI 40.78 kg/m        Physical Exam  Cardiovascular:      Rate and Rhythm: Bradycardia present.   Pulmonary:      Effort: Pulmonary effort is normal. No respiratory distress.   Neurological:      General: No focal deficit present.      Mental Status: He is alert.   Psychiatric:         Attention and Perception: Attention normal.         Mood and Affect: Mood normal.         Speech: Speech normal.         Behavior: Behavior is cooperative.         Thought Content: Thought content normal.         Judgment: Judgment normal.         LAB  Results for orders placed or performed in visit on 05/13/24   Drugs of Abuse Screen Urine (POC CUPS) POCT     Status: Abnormal   Result Value Ref Range    POCT Kit Lot Number [id number]     POCT Kit Expiration Date 08/22/2025     Temperature Urine POCT 92 F 90 F, 92 F, 94 F, 96 F, 98 F, 100 F    Specific [place] POCT 1.015 1.005, 1.015, 1.025    pH Qual Urine POCT 7 pH 4 pH, 5 pH, 7 pH, 9 pH    Creatinine Qual Urine POCT 100 mg/dL 20 mg/dL, 50 mg/dL, 100 mg/dL, 200 mg/dL    Internal QC Qual Urine POCT Valid Valid    Amphetamine Qual Urine POCT Negative Negative    Barbiturate Qual Urine POCT Negative Negative    Buprenorphine Qual Urine POCT Screen Positive (A) Negative    Benzodiazepine Qual Urine POCT Negative Negative    Cocaine Qual Urine POCT Negative Negative    Methamphetamine Qual Urine POCT Negative Negative    MDMA Qual Urine POCT Negative Negative    Methadone Qual Urine POCT Negative Negative    Opiate " Qual Urine POCT Negative Negative    Oxycodone Qual Urine POCT Negative Negative    Phencyclidine Qual Urine POCT Negative Negative    THC Qual Urine POCT Negative Negative         HISTORY                                                    Problem list reviewed & adjusted, as indicated.  Patient Active Problem List   Diagnosis    Opioid use disorder, severe, dependence (H)         MEDICATION LIST (prior to visit)  Current Outpatient Medications   Medication Sig Dispense Refill    [START ON 5/20/2024] Buprenorphine HCl-Naloxone HCl (SUBOXONE) 12-3 MG FILM per film Place 1 Film under the tongue daily as needed (withdrawal cravings) 30 Film 1    cloNIDine (CATAPRES) 0.1 MG tablet Take 1-2 tablets (0.1-0.2 mg) by mouth 3 times daily as needed (anxiety) 180 tablet 1    Fiber 500 MG CAPS Take 2 capsules by mouth daily 60 capsule 1    gabapentin (NEURONTIN) 300 MG capsule Take 3 capsules (900 mg) by mouth 3 times daily May take an additional 900 mg as needed for anxiety 360 capsule 1    buprenorphine HCl-naloxone HCl (SUBOXONE) 8-2 MG per film Place 1 Film under the tongue daily 30 Film 1    mirtazapine (REMERON) 15 MG tablet Take 1 tablet (15 mg) by mouth at bedtime 30 tablet 1    multivitamin w/minerals (MULTIVITAMINS W/MINERALS) tablet Take 1 tablet by mouth daily (Patient not taking: Reported on 1/2/2024) 90 tablet 3    naloxone (NARCAN) 4 MG/0.1ML nasal spray Spray 1 spray (4 mg) into one nostril alternating nostrils as needed for opioid reversal every 2-3 minutes until assistance arrives (Patient not taking: Reported on 5/6/2024) 0.2 mL 11    polyethylene glycol (MIRALAX) 17 GM/Dose powder Take 17 g (1 Capful) by mouth 2 times daily as needed for constipation 578 g 11    thiamine (B-1) 100 MG tablet Take 1 tablet (100 mg) by mouth daily (Patient not taking: Reported on 1/2/2024) 90 tablet 3     No current facility-administered medications for this visit.       MEDICATION LIST (after visit)  Current Outpatient  Medications   Medication Sig Dispense Refill    [START ON 5/20/2024] Buprenorphine HCl-Naloxone HCl (SUBOXONE) 12-3 MG FILM per film Place 1 Film under the tongue daily as needed (withdrawal cravings) 30 Film 1    cloNIDine (CATAPRES) 0.1 MG tablet Take 1-2 tablets (0.1-0.2 mg) by mouth 3 times daily as needed (anxiety) 180 tablet 1    Fiber 500 MG CAPS Take 2 capsules by mouth daily 60 capsule 1    gabapentin (NEURONTIN) 300 MG capsule Take 3 capsules (900 mg) by mouth 3 times daily May take an additional 900 mg as needed for anxiety 360 capsule 1    buprenorphine HCl-naloxone HCl (SUBOXONE) 8-2 MG per film Place 1 Film under the tongue daily 30 Film 1    mirtazapine (REMERON) 15 MG tablet Take 1 tablet (15 mg) by mouth at bedtime 30 tablet 1    multivitamin w/minerals (MULTIVITAMINS W/MINERALS) tablet Take 1 tablet by mouth daily (Patient not taking: Reported on 1/2/2024) 90 tablet 3    naloxone (NARCAN) 4 MG/0.1ML nasal spray Spray 1 spray (4 mg) into one nostril alternating nostrils as needed for opioid reversal every 2-3 minutes until assistance arrives (Patient not taking: Reported on 5/6/2024) 0.2 mL 11    polyethylene glycol (MIRALAX) 17 GM/Dose powder Take 17 g (1 Capful) by mouth 2 times daily as needed for constipation 578 g 11    thiamine (B-1) 100 MG tablet Take 1 tablet (100 mg) by mouth daily (Patient not taking: Reported on 1/2/2024) 90 tablet 3     No current facility-administered medications for this visit.         Allergies   Allergen Reactions    Cephalosporins Hives    Cefaclor Rash        Continued Complex Management  The longitudinal plan of care for Opioid Use Disorder (OUD) was addressed during this visit. Due to the added complexity in care, I will continue to support Troy in the subsequent management and with ongoing continuity of care.    Mandy Castillo, DNP,MSN, AGNP-C  [facility]   [address], [address]   [place] [address]   Phone #  [phone]

## 2024-05-17 ENCOUNTER — MYC MEDICAL ADVICE (OUTPATIENT)
Dept: ADDICTION MEDICINE | Facility: CLINIC | Age: 34
End: 2024-05-17
Payer: COMMERCIAL

## 2024-05-17 RX ORDER — BUPRENORPHINE AND NALOXONE 12; 3 MG/1; MG/1
1 FILM, SOLUBLE BUCCAL; SUBLINGUAL DAILY PRN
Qty: 45 FILM | Refills: 1 | Status: SHIPPED | OUTPATIENT
Start: 2024-05-20 | End: 2024-05-17

## 2024-05-17 RX ORDER — BUPRENORPHINE AND NALOXONE 12; 3 MG/1; MG/1
1 FILM, SOLUBLE BUCCAL; SUBLINGUAL DAILY PRN
Qty: 45 FILM | Refills: 1 | Status: SHIPPED | OUTPATIENT
Start: 2024-05-20 | End: 2024-08-20

## 2024-05-17 NOTE — TELEPHONE ENCOUNTER
See My chart message. He is wondering about getting another prescription for the suboxone 8 mg in addition to the 12 mg in case he needs more until his next injection. He doesn't think his insurance will let him fill the 12 mg before the 28 days is up.     Last visit:   . Opioid use disorder, severe, dependence (H)  Needs improvement. Is experiencing withdrawal symptoms since he received his first sublocade last week. He has been taking additional 12-16 mg daily to treat his symptoms but reports his reluctance in taking suboxone. Explained that since he was taking 32 mg of suboxone prior to getting the injection, it is expected that he might need additional suboxone to treat his symptoms and encouraged continued use.   Providing 12 mg suboxone films for him to take 1-1.5 films daily as needed.   Continue IOP through [facility] and meeting attendance.   Has joie Horner RN on 5/17/2024 at 11:05 AM

## 2024-05-17 NOTE — TELEPHONE ENCOUNTER
Spoke with patient. He is requesting 8 mg films prescription to avoid running out of suboxone next month since he is consistently taking 12-16 mg of additional buprenorphine daily. Explained that I sent in 12 mg films and he could take 12-18 mg daily. He does feel that will be enough buprenorphine but was concerned that only 30 films were sent to pharmacy, which he has not picked up yet. Sent in a new prescription for #45 films to avoid running out of suboxone.

## 2024-06-03 ENCOUNTER — INFUSION THERAPY VISIT (OUTPATIENT)
Dept: INFUSION THERAPY | Facility: CLINIC | Age: 34
End: 2024-06-03
Attending: NURSE PRACTITIONER
Payer: COMMERCIAL

## 2024-06-03 VITALS — DIASTOLIC BLOOD PRESSURE: 69 MMHG | TEMPERATURE: 98.5 F | SYSTOLIC BLOOD PRESSURE: 112 MMHG | HEART RATE: 56 BPM

## 2024-06-03 DIAGNOSIS — F11.20 OPIOID USE DISORDER, SEVERE, DEPENDENCE (H): Primary | ICD-10-CM

## 2024-06-03 PROCEDURE — 250N000011 HC RX IP 250 OP 636: Mod: JZ | Performed by: NURSE PRACTITIONER

## 2024-06-03 PROCEDURE — 250N000009 HC RX 250: Performed by: NURSE PRACTITIONER

## 2024-06-03 PROCEDURE — 96372 THER/PROPH/DIAG INJ SC/IM: CPT | Performed by: NURSE PRACTITIONER

## 2024-06-03 RX ORDER — METHYLPREDNISOLONE SODIUM SUCCINATE 125 MG/2ML
125 INJECTION, POWDER, LYOPHILIZED, FOR SOLUTION INTRAMUSCULAR; INTRAVENOUS
Status: CANCELLED
Start: 2024-06-30

## 2024-06-03 RX ORDER — ALBUTEROL SULFATE 90 UG/1
1-2 AEROSOL, METERED RESPIRATORY (INHALATION)
Status: CANCELLED
Start: 2024-06-30

## 2024-06-03 RX ORDER — ALBUTEROL SULFATE 0.83 MG/ML
2.5 SOLUTION RESPIRATORY (INHALATION)
Status: CANCELLED | OUTPATIENT
Start: 2024-06-30

## 2024-06-03 RX ORDER — LIDOCAINE HYDROCHLORIDE 10 MG/ML
2 INJECTION, SOLUTION EPIDURAL; INFILTRATION; INTRACAUDAL; PERINEURAL ONCE
Status: COMPLETED | OUTPATIENT
Start: 2024-06-03 | End: 2024-06-03

## 2024-06-03 RX ORDER — EPINEPHRINE 1 MG/ML
0.3 INJECTION, SOLUTION, CONCENTRATE INTRAVENOUS EVERY 5 MIN PRN
Status: CANCELLED | OUTPATIENT
Start: 2024-06-30

## 2024-06-03 RX ORDER — MEPERIDINE HYDROCHLORIDE 25 MG/ML
25 INJECTION INTRAMUSCULAR; INTRAVENOUS; SUBCUTANEOUS EVERY 30 MIN PRN
Status: CANCELLED | OUTPATIENT
Start: 2024-06-30

## 2024-06-03 RX ORDER — LIDOCAINE HYDROCHLORIDE 10 MG/ML
2 INJECTION, SOLUTION EPIDURAL; INFILTRATION; INTRACAUDAL; PERINEURAL ONCE
Status: CANCELLED | OUTPATIENT
Start: 2024-06-30 | End: 2024-06-30

## 2024-06-03 RX ORDER — DIPHENHYDRAMINE HYDROCHLORIDE 50 MG/ML
50 INJECTION INTRAMUSCULAR; INTRAVENOUS
Status: CANCELLED
Start: 2024-06-30

## 2024-06-03 RX ADMIN — BUPRENORPHINE 300 MG: 300 SOLUTION SUBCUTANEOUS at 12:24

## 2024-06-03 RX ADMIN — LIDOCAINE HYDROCHLORIDE 2 ML: 10 INJECTION, SOLUTION EPIDURAL; INFILTRATION; INTRACAUDAL; PERINEURAL at 12:30

## 2024-06-03 ASSESSMENT — PAIN SCALES - GENERAL: PAINLEVEL: NO PAIN (0)

## 2024-06-03 NOTE — PROGRESS NOTES
Infusion Nursing Note:  Troy Mccoy presents today for sublocade 300 mg  (#2).    Patient seen by provider today: No   present during visit today: Not Applicable.    Note: N/A.      Intravenous Access:  No Intravenous access/labs at this visit.    Treatment Conditions:  Denies relapse.  No S/S infection or tampering at previous injection sites.      Post Infusion Assessment:  Patient tolerated injection without incident.  Given in RLQ after 2ml subcutaneous xylocaine administered for local anesthesia.       Discharge Plan:   Patient discharged in stable condition accompanied by: self.  Departure Mode: Ambulatory.  RTC 7/1/24.      Stephanie Smith

## 2024-06-18 ENCOUNTER — MYC MEDICAL ADVICE (OUTPATIENT)
Dept: ADDICTION MEDICINE | Facility: CLINIC | Age: 34
End: 2024-06-18

## 2024-06-18 NOTE — TELEPHONE ENCOUNTER
Spoke with Troy who is still experiencing mild withdrawal symptoms He has significantly reduced his need for additional SL buprenorphine. Patient would like to continue sublocade 300 mg for one more injection before reducing to 100 mg monthly which seems reasonable given the persistent withdrawal symptoms. Therapy plan update, will receive 300 mg x 1 on 7/1 then begin 100 mg monthly. Has follow up 7/1/2024 following the injection.

## 2024-06-23 DIAGNOSIS — F41.9 ANXIETY: ICD-10-CM

## 2024-06-23 DIAGNOSIS — F10.20 ALCOHOL USE DISORDER, SEVERE, DEPENDENCE (H): ICD-10-CM

## 2024-06-24 RX ORDER — GABAPENTIN 300 MG/1
CAPSULE ORAL
Qty: 360 CAPSULE | Refills: 1 | OUTPATIENT
Start: 2024-06-24

## 2024-06-24 NOTE — TELEPHONE ENCOUNTER
Date of Last Office Visit: 7/1/24  Date of Next Office Visit: 5/13/24  No shows since last visit: 0  Cancellations since last visit: 0    Medication requested:    Pending Prescriptions:                       Disp   Refills    gabapentin (NEURONTIN) 300 MG capsule [Ph*360 ca*1            Sig: TAKE THREE CAPSULES BY MOUTH THREE TIMES DAILY           *may take 3 CAPSULES AS NEEDED FOR ANXIETY *      [place] of [place] ?: No. This RN is not an authorized delegate on     Lapse in medication adherence greater than 5 days?: No  If yes, call patient and gather details: NA  Medication refill request verified as identical to current order?: Yes  Result of Last DAM, VPA, Li+ Level, CBC, or Carbamazepine Level (at or since last visit): N/A    Last visit treatment plan: 5/13/24  2. Alcohol use disorder, severe, dependence (H)  Denies cravings or return to use, continue gabapentin.   Continue programming at [facility] and meeting attendance  - gabapentin (NEURONTIN) 300 MG capsule; Take 3 capsules (900 mg) by mouth 3 times daily May take an additional 900 mg as needed for anxiety  Dispense: 360 capsule; Refill: 1       RTC  Return in about 5 weeks (around 6/18/2024) for Follow up, using a video visit at 1100 am.         []Medication refilled per  Medication Refill in Ambulatory Care  policy.  [x]Medication unable to be refilled by RN due to criteria not met as indicated below:    []Eligibility - not seen in the last year   []Supervision - no future appointment   []Compliance - no shows, cancellations or lapse in therapy   []Verification - order discrepancy   [x]Controlled medication   []Medication not included in policy   []90-day supply request   [x]Other - Medication is too soon to fill. Next fill due around 7/5/24. Patient has appointment on 7/1/24

## 2024-06-27 ENCOUNTER — MYC REFILL (OUTPATIENT)
Dept: ADDICTION MEDICINE | Facility: CLINIC | Age: 34
End: 2024-06-27
Payer: COMMERCIAL

## 2024-06-27 DIAGNOSIS — F10.20 ALCOHOL USE DISORDER, SEVERE, DEPENDENCE (H): ICD-10-CM

## 2024-06-27 DIAGNOSIS — F51.01 PRIMARY INSOMNIA: ICD-10-CM

## 2024-06-27 DIAGNOSIS — F41.9 ANXIETY: ICD-10-CM

## 2024-06-27 RX ORDER — GABAPENTIN 300 MG/1
900 CAPSULE ORAL 3 TIMES DAILY
Qty: 360 CAPSULE | Refills: 1 | OUTPATIENT
Start: 2024-06-27

## 2024-06-27 RX ORDER — MIRTAZAPINE 15 MG/1
15 TABLET, FILM COATED ORAL AT BEDTIME
Qty: 30 TABLET | Refills: 1 | OUTPATIENT
Start: 2024-06-27

## 2024-06-27 RX ORDER — CLONIDINE HYDROCHLORIDE 0.1 MG/1
.1-.2 TABLET ORAL 3 TIMES DAILY PRN
Qty: 180 TABLET | Refills: 1 | OUTPATIENT
Start: 2024-06-27

## 2024-06-27 NOTE — TELEPHONE ENCOUNTER
MyC refill message -  Refills have been requested for the following medications:         mirtazapine (REMERON) 15 MG tablet [Mandy Castillo]         gabapentin (NEURONTIN) 300 MG capsule [Mandy Castillo]       Patient Comment: on this bottle its not a full refill, [facility] care will only let me fill 270 at a time, but I can fill every three weeks with the extra dose so I would still like it.         cloNIDine (CATAPRES) 0.1 MG tablet [Mandy Castillo]       Patient Comment: on my bottles it says no refills. fill date is three days after are visit.     Preferred pharmacy: [facility] - [place], [place] - [address]       Refill Information -   Date of Last Office Visit: 5/13/2024  Date of Next Office Visit: 7/1/2024  No shows since last visit: 6/18/2024 left before being seen   Cancellations since last visit: none    Medication requested: mirtazapine (REMERON) 15 MG tablet  Date last ordered: 5/13/2024 Qty: 30 Refills: 1  TAKE 1 Tablet BY MOUTH EVERY NIGHT AT BEDTIME      Medication requested: gabapentin (NEURONTIN) 300 MG capsule  Date last ordered: 5/13/2024 Qty: 360 Refills: 1  Take 3 capsules (900 mg) by mouth 3 times daily May take an additional 900 mg as needed for anxiety     Medication requested: clonidine (CATAPRES) 0.1 MG tablet  Date last ordered: 5/13/2024 Qty: 180 Refills: 1  Take 1-2 tablets (0.1-0.2 mg) by mouth 3 times daily as needed (anxiety)       [place] of [place] ?: Yes  Filled  Written  Sold  ID  Drug  QTY  Days  Prescriber  RX #  Dispenser  Refill  Daily Dose*  Pymt Type      06/24/2024 05/17/2024 06/24/2024 1 Suboxone 12 Mg-3 Mg Sl Film 30.00 30 He Bat [id number] Equ (1193) 1/1 12.00 mg [place]   06/10/2024 05/13/2024 06/10/2024 1 Gabapentin 300 Mg Capsule 270.00 23 He Bat 11926 Equ (1193) 1/1  [place]   05/20/2024 05/13/2024 05/20/2024 1 Gabapentin 300 Mg Capsule 270.00 23 He Bat 39975 Equ (1193) 0/1  [place]   05/20/2024 05/17/2024 05/20/2024 1 Suboxone 12 Mg-3  Mg Sl Film 30.00 30 He Bat 05266 Equ (1193) 0/1 12.00 mg [place]   04/22/2024 03/25/2024 04/22/2024 1 Suboxone 8 Mg-2 Mg Sl Film 30.00 30 He Bat 8394 Equ (1193) 1/1 8.00 mg [place]   04/22/2024 03/25/2024 04/22/2024 1 Suboxone 12 Mg-3 Mg Sl Film 60.00 30 Hermelindo Briscoe 8395 Equ (1193) 1/1 24.00 mg [place]   04/22/2024 03/25/2024 04/22/2024 1 Gabapentin 300 Mg Capsule 270.00 30 He Luis Felipe             RN called [facility] at [phone] and spoke with [place] pharmacy technician  The patient has the following on file at the pharmacy-  1) mirtazapine (REMERON) 15 MG tablet  has one 30-dayrefill on file  2) gabapentin (NEURONTIN) 300 MG capsule  still has 180 capsules on file from the 720 that were sent, the pt can only get 270 capsules at a time.  3) clonidine (CATAPRES) 0.1 MG tablet  has no refills on file, last refill was a 30-day supply and picked up on 6/10/2024    The pharmacy will fill the last refill on file of mirtazapine 15 mg tablet, #180 capsules of the gabapentin 300 mg capsules and the pt should have enough clonidine 0.1 mg tablets to last until 7/1/2024 appt with Mandy as a 30-day supply was picked up on 6/1/02024       RN sent a MyC message with the above pharmacy information/findings to the patient.    Juliet Roy RN on 6/27/2024 at 11:39 AM

## 2024-06-28 RX ORDER — CLONIDINE HYDROCHLORIDE 0.1 MG/1
.1-.2 TABLET ORAL 3 TIMES DAILY PRN
Qty: 180 TABLET | Refills: 0 | Status: SHIPPED | OUTPATIENT
Start: 2024-06-28 | End: 2024-07-01

## 2024-06-28 RX ORDER — GABAPENTIN 300 MG/1
900 CAPSULE ORAL 3 TIMES DAILY
Qty: 270 CAPSULE | Refills: 0 | Status: SHIPPED | OUTPATIENT
Start: 2024-06-28 | End: 2024-07-01

## 2024-06-28 RX ORDER — MIRTAZAPINE 15 MG/1
15 TABLET, FILM COATED ORAL AT BEDTIME
Qty: 30 TABLET | Refills: 0 | Status: SHIPPED | OUTPATIENT
Start: 2024-06-28 | End: 2024-08-20

## 2024-06-28 NOTE — TELEPHONE ENCOUNTER
MyC messages from the pt after RN denied refills base on protocol.  -I don't want to refill my gabapentin as a partial, I don't have the means to go to the pharmacy every week..... also are appointment is at three in the afternoon and I would like to fill same day.. If you could please pass this along with my original messages that would be great. thank you.   AND  -Usually she will give me refills when we meet, I missed an appointment last week on Tuesday. thats why I'm reaching out. ahead of time. so I'm not screwed on monday.       RN pended a 30 day supply of mirtazapine and clonidine along with a quantity of 270 capsules of gabapentin as that is all that is allowed by his insurance to  at one time and routed to Mandy Castillo CNP for review.    Juliet Roy RN on 6/28/2024 at 7:46 AM

## 2024-07-01 ENCOUNTER — INFUSION THERAPY VISIT (OUTPATIENT)
Dept: INFUSION THERAPY | Facility: CLINIC | Age: 34
End: 2024-07-01
Attending: NURSE PRACTITIONER
Payer: COMMERCIAL

## 2024-07-01 ENCOUNTER — VIRTUAL VISIT (OUTPATIENT)
Dept: ADDICTION MEDICINE | Facility: CLINIC | Age: 34
End: 2024-07-01
Payer: COMMERCIAL

## 2024-07-01 VITALS
SYSTOLIC BLOOD PRESSURE: 140 MMHG | DIASTOLIC BLOOD PRESSURE: 74 MMHG | WEIGHT: 315 LBS | BODY MASS INDEX: 39.17 KG/M2 | HEIGHT: 75 IN

## 2024-07-01 VITALS
TEMPERATURE: 98.2 F | RESPIRATION RATE: 18 BRPM | DIASTOLIC BLOOD PRESSURE: 77 MMHG | SYSTOLIC BLOOD PRESSURE: 145 MMHG | HEART RATE: 58 BPM | OXYGEN SATURATION: 96 %

## 2024-07-01 DIAGNOSIS — F11.20 OPIOID USE DISORDER, SEVERE, DEPENDENCE (H): Primary | ICD-10-CM

## 2024-07-01 DIAGNOSIS — F41.9 ANXIETY: ICD-10-CM

## 2024-07-01 DIAGNOSIS — Z79.891 ENCOUNTER FOR MONITORING OPIOID MAINTENANCE THERAPY: ICD-10-CM

## 2024-07-01 DIAGNOSIS — Z51.81 ENCOUNTER FOR MONITORING OPIOID MAINTENANCE THERAPY: ICD-10-CM

## 2024-07-01 DIAGNOSIS — F10.20 ALCOHOL USE DISORDER, SEVERE, DEPENDENCE (H): ICD-10-CM

## 2024-07-01 PROCEDURE — 99214 OFFICE O/P EST MOD 30 MIN: CPT | Mod: 95 | Performed by: NURSE PRACTITIONER

## 2024-07-01 PROCEDURE — G2211 COMPLEX E/M VISIT ADD ON: HCPCS | Mod: 95 | Performed by: NURSE PRACTITIONER

## 2024-07-01 PROCEDURE — 96372 THER/PROPH/DIAG INJ SC/IM: CPT | Performed by: NURSE PRACTITIONER

## 2024-07-01 PROCEDURE — 250N000009 HC RX 250: Performed by: NURSE PRACTITIONER

## 2024-07-01 PROCEDURE — 250N000011 HC RX IP 250 OP 636: Performed by: NURSE PRACTITIONER

## 2024-07-01 RX ORDER — EPINEPHRINE 1 MG/ML
0.3 INJECTION, SOLUTION, CONCENTRATE INTRAVENOUS EVERY 5 MIN PRN
Status: CANCELLED | OUTPATIENT
Start: 2024-07-29

## 2024-07-01 RX ORDER — DIPHENHYDRAMINE HYDROCHLORIDE 50 MG/ML
50 INJECTION INTRAMUSCULAR; INTRAVENOUS
Status: CANCELLED
Start: 2024-07-29

## 2024-07-01 RX ORDER — ALBUTEROL SULFATE 90 UG/1
1-2 AEROSOL, METERED RESPIRATORY (INHALATION)
Status: CANCELLED
Start: 2024-07-29

## 2024-07-01 RX ORDER — MEPERIDINE HYDROCHLORIDE 25 MG/ML
25 INJECTION INTRAMUSCULAR; INTRAVENOUS; SUBCUTANEOUS EVERY 30 MIN PRN
Status: CANCELLED | OUTPATIENT
Start: 2024-07-29

## 2024-07-01 RX ORDER — LIDOCAINE HYDROCHLORIDE 10 MG/ML
2 INJECTION, SOLUTION EPIDURAL; INFILTRATION; INTRACAUDAL; PERINEURAL ONCE
Status: COMPLETED | OUTPATIENT
Start: 2024-07-01 | End: 2024-07-01

## 2024-07-01 RX ORDER — GABAPENTIN 300 MG/1
900 CAPSULE ORAL 3 TIMES DAILY
Qty: 270 CAPSULE | Refills: 2 | Status: SHIPPED | OUTPATIENT
Start: 2024-07-01 | End: 2024-09-30

## 2024-07-01 RX ORDER — ALBUTEROL SULFATE 0.83 MG/ML
2.5 SOLUTION RESPIRATORY (INHALATION)
Status: CANCELLED | OUTPATIENT
Start: 2024-07-29

## 2024-07-01 RX ORDER — CLONIDINE HYDROCHLORIDE 0.1 MG/1
.1-.2 TABLET ORAL 3 TIMES DAILY PRN
Qty: 180 TABLET | Refills: 2 | Status: SHIPPED | OUTPATIENT
Start: 2024-07-01 | End: 2024-09-01

## 2024-07-01 RX ORDER — METHYLPREDNISOLONE SODIUM SUCCINATE 125 MG/2ML
125 INJECTION, POWDER, LYOPHILIZED, FOR SOLUTION INTRAMUSCULAR; INTRAVENOUS
Status: CANCELLED
Start: 2024-07-29

## 2024-07-01 RX ORDER — LIDOCAINE HYDROCHLORIDE 10 MG/ML
2 INJECTION, SOLUTION EPIDURAL; INFILTRATION; INTRACAUDAL; PERINEURAL ONCE
Status: CANCELLED | OUTPATIENT
Start: 2024-07-29 | End: 2024-07-29

## 2024-07-01 RX ADMIN — LIDOCAINE HYDROCHLORIDE 2 ML: 10 INJECTION, SOLUTION EPIDURAL; INFILTRATION; INTRACAUDAL; PERINEURAL at 12:27

## 2024-07-01 RX ADMIN — BUPRENORPHINE 300 MG: 300 SOLUTION SUBCUTANEOUS at 12:31

## 2024-07-01 ASSESSMENT — PAIN SCALES - GENERAL
PAINLEVEL: NO PAIN (0)
PAINLEVEL: NO PAIN (0)

## 2024-07-01 NOTE — NURSING NOTE
Is the patient currently in the state of [place]? YES    Visit mode:VIDEO    If the visit is dropped, the patient can be reconnected by: VIDEO VISIT: Text to cell phone:   Telephone Information:   Mobile [phone]       Will anyone else be joining the visit? NO  (If patient encounters technical issues they should call [phone] :[phone])    How would you like to obtain your AVS? MyChart    Are changes needed to the allergy or medication list? No    Are refills needed on medications prescribed by this physician? NO    Reason for visit: RECHECK    Care team has reviewed attendance agreement with patient. Patient advised that two failed appointments within 6 months may lead to termination of current episode of care.    Larissa RAMÍREZF

## 2024-07-01 NOTE — PROGRESS NOTES
Infusion Nursing Note:  Troy Mccoy presents today for sublocade 300mg.    Patient seen by provider today: Yes: virtual visit this afternoon with Mandy Castillo   present during visit today: Not Applicable.    Note: Patient states he started getting cravings after 3 weeks.  Was using suboxone and subocade dose increased to 300mg.  Denies relapse.  No evidence of tampering noted.      Intravenous Access:  No Intravenous access/labs at this visit.    Treatment Conditions:  Not Applicable.      Post Infusion Assessment:  Patient tolerated injection without incident.  Lidocaine given prior to sublocade.  Sublocade injection given in the same trajectory without difficulty.  LLQ      Discharge Plan:   Patient discharged in stable condition accompanied by: self.  Departure Mode: Ambulatory.  Will return to clinic in 4 weeks, appointment scheduled.    Chapis Chun RN

## 2024-07-01 NOTE — PROGRESS NOTES
Virtual Visit Details    Type of service:  Video Visit   Video Start Time:  1551  Video End Time:4:05 PM    Originating Location (pt. Location): Home    Distant Location (provider location):  On-site  Platform used for Video Visit: [facility]    A/P                                                    ASSESSMENT/PLAN  1. Opioid use disorder, severe, dependence (H)  Celebrating 1 year of abstinence this week. Received #3 sublocade (300 mg) today. Reports improvement to his withdrawal symptoms after receiving his second sublcoade injection. He did not require an additional buprenorphine until a week ago. Has been taking 8-16 mg daily for the last few days.   Continue monthly sublocade, starts 100 mg injection on 7/29.  Continue sl buprenorphine as needed. NO refill needed.   Confirms narcan access  Continue recovery supports    2. Alcohol use disorder, severe, dependence (H)  Denies cravings or return to use. Has 1 year without alcohol this week.   Continue recovery supports  - gabapentin (NEURONTIN) 300 MG capsule; Take 3 capsules (900 mg) by mouth 3 times daily May take an additional 900 mg as needed for anxiety  Dispense: 270 capsule; Refill: 2  - AST; Future  - ALT; Future    3. Anxiety  Symptoms managed with gabapentin and clonidine, continue.  - cloNIDine (CATAPRES) 0.1 MG tablet; Take 1-2 tablets (0.1-0.2 mg) by mouth 3 times daily as needed (anxiety)  Dispense: 180 tablet; Refill: 2  - gabapentin (NEURONTIN) 300 MG capsule; Take 3 capsules (900 mg) by mouth 3 times daily May take an additional 900 mg as needed for anxiety  Dispense: 270 capsule; Refill: 2    4. Encounter for monitoring opioid maintenance therapy  - AST; Future  - ALT; Future    Orders Placed This Encounter   Medications    cloNIDine (CATAPRES) 0.1 MG tablet     Sig: Take 1-2 tablets (0.1-0.2 mg) by mouth 3 times daily as needed (anxiety)     Dispense:  180 tablet     Refill:  2    gabapentin  (NEURONTIN) 300 MG capsule     Sig: Take 3 capsules (900 mg) by mouth 3 times daily May take an additional 900 mg as needed for anxiety     Dispense:  270 capsule     Refill:  2       Problem list updated Jul 1, 2024   No problems updated.          PDMP Review         Value Time User    State PDMP site checked  Yes 7/2/2024 12:33 PM Mandy Castillo, BELLE          05/17/2024 5 Suboxone 12 Mg-3 Mg Sl Film 30.00 30 He Bat [id number] Equ (1193) 1/1 12.00 mg Comm Ins [place]   06/10/2024 05/13/2024 5 Gabapentin 300 Mg Capsule 270.00 23 He Bat 08817 Equ (11            RTC  Return in about 13 weeks (around 9/30/2024) for Follow up, with me, in person 1130.      Counseled the patient on the importance of having a recovery program in addition to medication to manage recovery.  Components include avoiding isolating, having willingness to change, avoiding triggers and managing cravings. Encouraged having some type of sober network and practicing honesty with trusted support person(s). Encouraged other services such as counseling, 12 step or other self-help organizations.      Opioid warning reviewed.  Risk of overdose following a period of abstinence due to decrease tolerance was discussed including risk of death.  Strongly recommended abstain from alcohol, benzodiazepines, THC, opioids and other drugs of abuse.  Increased risk of return to opioid use after use of these substances discussed.  Increased risk of overdose/death with use of other substances particularly benzodiazepines/alcohol reviewed.        SUBJECTIVE                                                    Troy Mccoy is a 33 year old male with PMH  HX of hepatitis C s/p treatment with [facility] 2022, seizures related to alcohol withdrawal, chronic LBP, anxiety,  alcohol use disorder, methamphetamine use disorder, and opioid use disorder on buprenorphine who [facility].      Brief History:  Troy Mccoy was first seen in [facility] on 07/13/23. They were referred by staff at [facility] to assist pt in starting buprenorphine.  He has been using heroin/fentnayl 0.5-1 gram daily, last use was 7/2/23. He has history of daily IV meth use, last use 2018, and history of drinking 1 gallon of vodka daily until 2018 when he was hospitalized and in ICU for seizures. He maintained 1 year of abstinence, and returned to alcohol use. He began using fentanyl/heroin IV Summer 2022,  and decreased his alcohol use to \"few drinks\", couple times per month. He entered residential treatment at [facility], was started on Suboxone at initial visit.   Returned to  9/12/23 after graduating from [facility].  Had been prescribed buprenorphine by Elías Read NP while in [facility], unable to continue after graduation due to distance to [facility] for in person visits.  Started OP with [facility] 9/2023.   10/10/23 buprenorphine increased to 32mg TDD.   [facility]            Substance Use History :  Opioids:   Age at first use: 14-15 years.   Current use: substance: fentanyl, heroin; quantity 0.5-1 gram daily route: inhaled and IV; timing of last use: approx week of 7/2/223 ;                 IV drug use: Yes:   History of overdose: Yes: 12 times  Previous residential or outpatient treatments for addiction : Yes: [facility] 7/12/23  Previous medication treatments for addiction: No  Longest period of sobriety: 1 year, 2018  Medical complications related to substance use: no  Hepatitis C: Received treatment with [facility] 2022; 7/20/23 HCV RNA not detected  HIV: 7/20/23 HIV ag/ab nonreactive     Other Addiction History:  Stimulants   Meth IV, hx of daily use before 2018. Last use was in 2018  Sedatives/hypnotics/anxiolytics:   BDZ occasional  Alcohol:   Yes, HX of drinking 1 gallon a day of vodka until 2018. Since then drinking few drinks couple times monthly.   Nicotine:   vaping  Cannabis:   Yes, " smokes couple of hits here and there.   Hallucinogens/Dissociatives:   Yes, none recently  Eating disorder:  no  Gambling:              no        PAST PSYCHIATRIC HISTORY:  Diagnoses- anxiety  Suicide Attempts: Yes  approx 2021  Hospitalizations: Yes 2021     Social History  Housing status: sober house  Employment status: Unemployed, not seeking work  Relationship status: Single  Children: no children  Legal: none  Insurance needs: active  Contact information up to date? yes       Recent HPI Details:5/13/2024  Received his first sublocade last week . Injection went well, did not hurt at all. No injection site irritation.   Has been experiencing withdrawal since getting his sublocade last week. Mainly mild irritability, abdominal cramps, insomnia. Tried going without taking additional suboxone but has needed to take 12-16 mg daily to treat symptoms.    Some increased constipation since getting sublocade  Wants additional gabapentin and clonidine to take at bedtime to help him sleep.   1. Opioid use disorder, severe, dependence (H)  Needs improvement. Is experiencing withdrawal symptoms since he received his first sublocade last week. He has been taking additional 12-16 mg daily to treat his symptoms but reports his reluctance in taking suboxone. Explained that since he was taking 32 mg of suboxone prior to getting the injection, it is expected that he might need additional suboxone to treat his symptoms and encouraged continued use.   Providing 12 mg suboxone films for him to take 1-1.5 films daily as needed.   Continue IOP through [facility] and meeting attendance.   Has narcan  - Buprenorphine HCl-Naloxone HCl (SUBOXONE) 12-3 MG FILM per film; Place 1 Film under the tongue daily as needed (withdrawal cravings)  Dispense: 30 Film; Refill: 1     2. Alcohol use disorder, severe, dependence (H)  Denies cravings or return to use, continue gabapentin.   Continue programming at [facility] and meeting attendance  - gabapentin  (NEURONTIN) 300 MG capsule; Take 3 capsules (900 mg) by mouth 3 times daily May take an additional 900 mg as needed for anxiety  Dispense: 360 capsule; Refill: 1     3. Anxiety  Reports increased anxiety and insomnia related to opiate withdrawal, requested an additional dose of gabapentin and clonidine.   May take an additional 900 mg of gabapentin  Declined increase to clonidine today, continue clonidine unchanged. Is experience some bradycardia today which is unusual for him. Is asymptomatic.   - gabapentin (NEURONTIN) 300 MG capsule; Take 3 capsules (900 mg) by mouth 3 times daily May take an additional 900 mg as needed for anxiety  Dispense: 360 capsule; Refill: 1  - cloNIDine (CATAPRES) 0.1 MG tablet; Take 1-2 tablets (0.1-0.2 mg) by mouth 3 times daily as needed (anxiety)  Dispense: 180 tablet; Refill: 1     4. Encounter for monitoring opioid maintenance therapy  - Drugs of Abuse Screen Urine (POC CUPS) POCT     5. Therapeutic opioid-induced constipation (OIC)  Is experiencing more constipation since getting sublocade, is taking miralax and fiber daily. Providing fiber refill today.   - Fiber 500 MG CAPS; Take 2 capsules by mouth daily  Dispense: 60 capsule; Refill: 1    TODAY'S VISIT  HPI Jul 1, 2024    Received #3 sublocade (300 mg) today. States he just started taking additional suboxone to treat withdrawal in the last week.   After his second injection did not need any additional buprenorphine for about 3 weeks, then began to experience withdrawal.   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 Busy in the Summer  Has been playing a lot of disc golf this summer.   Still attending his meetings and living in sober house  Will have 1 year 7/3/2024. [facility] to celebrate a few birthdays in the house           1/29/2024    12:00 PM 3/25/2024    10:51 AM 5/13/2024     1:31 PM   PHQ   PHQ-9 Total Score 5 4 7   Q9: Thoughts of better off dead/self-harm past 2 weeks Not at  "all Not at all Not at all       OBJECTIVE                                                    PHYSICAL EXAM:  BP (!) 140/74   Ht 1.905 m (6' 3\")   Wt 142.9 kg (315 lb)   BMI 39.37 kg/m        Physical Exam  Cardiovascular:      Rate and Rhythm: Normal rate.   Neurological:      General: No focal deficit present.      Mental Status: He is alert and oriented to person, place, and time.   Psychiatric:         Attention and Perception: Attention normal.         Mood and Affect: Mood normal.         Speech: Speech normal.         Behavior: Behavior is cooperative.         Thought Content: Thought content normal.         Judgment: Judgment normal.         LAB      HISTORY                                                    Problem list reviewed & adjusted, as indicated.  Patient Active Problem List   Diagnosis    Opioid use disorder, severe, dependence (H)         MEDICATION LIST (prior to visit)  Current Outpatient Medications   Medication Sig Dispense Refill    cloNIDine (CATAPRES) 0.1 MG tablet Take 1-2 tablets (0.1-0.2 mg) by mouth 3 times daily as needed (anxiety) 180 tablet 2    gabapentin (NEURONTIN) 300 MG capsule Take 3 capsules (900 mg) by mouth 3 times daily May take an additional 900 mg as needed for anxiety 270 capsule 2    Buprenorphine HCl-Naloxone HCl (SUBOXONE) 12-3 MG FILM per film Place 1 Film under the tongue daily as needed (withdrawal cravings) 45 Film 1    buprenorphine HCl-naloxone HCl (SUBOXONE) 8-2 MG per film Place 1 Film under the tongue daily 30 Film 1    Fiber 500 MG CAPS Take 2 capsules by mouth daily 60 capsule 1    mirtazapine (REMERON) 15 MG tablet Take 1 tablet (15 mg) by mouth at bedtime 30 tablet 0    multivitamin w/minerals (MULTIVITAMINS W/MINERALS) tablet Take 1 tablet by mouth daily (Patient not taking: Reported on 1/2/2024) 90 tablet 3    naloxone (NARCAN) 4 MG/0.1ML nasal spray Spray 1 spray (4 mg) into one nostril alternating nostrils as needed for opioid reversal every 2-3 " minutes until assistance arrives (Patient not taking: Reported on 5/6/2024) 0.2 mL 11    polyethylene glycol (MIRALAX) 17 GM/Dose powder Take 17 g (1 Capful) by mouth 2 times daily as needed for constipation 578 g 11    thiamine (B-1) 100 MG tablet Take 1 tablet (100 mg) by mouth daily (Patient not taking: Reported on 1/2/2024) 90 tablet 3     No current facility-administered medications for this visit.       MEDICATION LIST (after visit)  Current Outpatient Medications   Medication Sig Dispense Refill    cloNIDine (CATAPRES) 0.1 MG tablet Take 1-2 tablets (0.1-0.2 mg) by mouth 3 times daily as needed (anxiety) 180 tablet 2    gabapentin (NEURONTIN) 300 MG capsule Take 3 capsules (900 mg) by mouth 3 times daily May take an additional 900 mg as needed for anxiety 270 capsule 2    Buprenorphine HCl-Naloxone HCl (SUBOXONE) 12-3 MG FILM per film Place 1 Film under the tongue daily as needed (withdrawal cravings) 45 Film 1    buprenorphine HCl-naloxone HCl (SUBOXONE) 8-2 MG per film Place 1 Film under the tongue daily 30 Film 1    Fiber 500 MG CAPS Take 2 capsules by mouth daily 60 capsule 1    mirtazapine (REMERON) 15 MG tablet Take 1 tablet (15 mg) by mouth at bedtime 30 tablet 0    multivitamin w/minerals (MULTIVITAMINS W/MINERALS) tablet Take 1 tablet by mouth daily (Patient not taking: Reported on 1/2/2024) 90 tablet 3    naloxone (NARCAN) 4 MG/0.1ML nasal spray Spray 1 spray (4 mg) into one nostril alternating nostrils as needed for opioid reversal every 2-3 minutes until assistance arrives (Patient not taking: Reported on 5/6/2024) 0.2 mL 11    polyethylene glycol (MIRALAX) 17 GM/Dose powder Take 17 g (1 Capful) by mouth 2 times daily as needed for constipation 578 g 11    thiamine (B-1) 100 MG tablet Take 1 tablet (100 mg) by mouth daily (Patient not taking: Reported on 1/2/2024) 90 tablet 3     No current facility-administered medications for this visit.         Allergies   Allergen Reactions    Cephalosporins  Hives    Cefaclor Rash        Continued Complex Management  The longitudinal plan of care for Opioid Use Disorder (OUD) and Alcohol Use Disorder (AUD) was addressed during this visit. Due to the added complexity in care, I will continue to support Troy in the subsequent management and with ongoing continuity of care.          Mandy Castillo DNP,MSN, AGNP-C  [facility]   [address] , [address]   [place] [address]   Phone # [phone]

## 2024-07-29 ENCOUNTER — INFUSION THERAPY VISIT (OUTPATIENT)
Dept: INFUSION THERAPY | Facility: CLINIC | Age: 34
End: 2024-07-29
Attending: NURSE PRACTITIONER
Payer: COMMERCIAL

## 2024-07-29 VITALS — HEART RATE: 84 BPM | DIASTOLIC BLOOD PRESSURE: 74 MMHG | SYSTOLIC BLOOD PRESSURE: 120 MMHG

## 2024-07-29 DIAGNOSIS — F11.20 OPIOID USE DISORDER, SEVERE, DEPENDENCE (H): Primary | ICD-10-CM

## 2024-07-29 PROCEDURE — 96372 THER/PROPH/DIAG INJ SC/IM: CPT | Performed by: NURSE PRACTITIONER

## 2024-07-29 PROCEDURE — 250N000011 HC RX IP 250 OP 636: Performed by: NURSE PRACTITIONER

## 2024-07-29 PROCEDURE — 250N000009 HC RX 250: Performed by: NURSE PRACTITIONER

## 2024-07-29 RX ORDER — MEPERIDINE HYDROCHLORIDE 25 MG/ML
25 INJECTION INTRAMUSCULAR; INTRAVENOUS; SUBCUTANEOUS EVERY 30 MIN PRN
Status: CANCELLED | OUTPATIENT
Start: 2024-08-26

## 2024-07-29 RX ORDER — METHYLPREDNISOLONE SODIUM SUCCINATE 125 MG/2ML
125 INJECTION, POWDER, LYOPHILIZED, FOR SOLUTION INTRAMUSCULAR; INTRAVENOUS
Status: CANCELLED
Start: 2024-08-26

## 2024-07-29 RX ORDER — LIDOCAINE HYDROCHLORIDE 10 MG/ML
2 INJECTION, SOLUTION EPIDURAL; INFILTRATION; INTRACAUDAL; PERINEURAL ONCE
Status: COMPLETED | OUTPATIENT
Start: 2024-07-29 | End: 2024-07-29

## 2024-07-29 RX ORDER — EPINEPHRINE 1 MG/ML
0.3 INJECTION, SOLUTION, CONCENTRATE INTRAVENOUS EVERY 5 MIN PRN
Status: CANCELLED | OUTPATIENT
Start: 2024-08-26

## 2024-07-29 RX ORDER — ALBUTEROL SULFATE 0.83 MG/ML
2.5 SOLUTION RESPIRATORY (INHALATION)
Status: CANCELLED | OUTPATIENT
Start: 2024-08-26

## 2024-07-29 RX ORDER — DIPHENHYDRAMINE HYDROCHLORIDE 50 MG/ML
50 INJECTION INTRAMUSCULAR; INTRAVENOUS
Status: CANCELLED
Start: 2024-08-26

## 2024-07-29 RX ORDER — ALBUTEROL SULFATE 90 UG/1
1-2 AEROSOL, METERED RESPIRATORY (INHALATION)
Status: CANCELLED
Start: 2024-08-26

## 2024-07-29 RX ORDER — LIDOCAINE HYDROCHLORIDE 10 MG/ML
2 INJECTION, SOLUTION EPIDURAL; INFILTRATION; INTRACAUDAL; PERINEURAL ONCE
Status: CANCELLED | OUTPATIENT
Start: 2024-08-26 | End: 2024-08-26

## 2024-07-29 RX ADMIN — BUPRENORPHINE 100 MG: 100 SOLUTION SUBCUTANEOUS at 12:38

## 2024-07-29 RX ADMIN — LIDOCAINE HYDROCHLORIDE 2 ML: 10 INJECTION, SOLUTION EPIDURAL; INFILTRATION; INTRACAUDAL; PERINEURAL at 12:32

## 2024-07-29 ASSESSMENT — PAIN SCALES - GENERAL: PAINLEVEL: NO PAIN (0)

## 2024-07-29 NOTE — PROGRESS NOTES
Infusion Nursing Note:  Troy Mccoy presents today for sublocade 100 mg injection.    Patient seen by provider today: No   present during visit today: Not Applicable.    Note: N/A.      Intravenous Access:  No Intravenous access/labs at this visit.    Treatment Conditions:  Denies relapse.  No S/S infection or tampering at previous injection sites.      Post Infusion Assessment:  Patient tolerated injection without incident.  Given in RLQ after 2ml subcutaneous xylocaine administered for local anesthesia.       Discharge Plan:   Patient discharged in stable condition accompanied by: self.  Departure Mode: Ambulatory.  RTC 8/26/24.      Stephanie Smith

## 2024-08-11 DIAGNOSIS — F11.20 OPIOID USE DISORDER, SEVERE, DEPENDENCE (H): ICD-10-CM

## 2024-08-12 RX ORDER — BUPRENORPHINE HYDROCHLORIDE, NALOXONE HYDROCHLORIDE 12; 3 MG/1; MG/1
FILM, SOLUBLE BUCCAL; SUBLINGUAL
Qty: 45 FILM | Refills: 1 | OUTPATIENT
Start: 2024-08-12

## 2024-08-12 NOTE — TELEPHONE ENCOUNTER
1) Reviewed refill request for Suboxone 12mg-3mg film. No access to [facility] as a delegate for provider. Dispense report indicates that the Suboxone was last dispensed on 7/22/24 for a 30 day supply.     2) The patient should have enough Suboxone to last until around 8/22/24    3) Called and spoke with the patient. He reports that he has about #10 strips of Suboxone at home and doesn't need a refill at this time. He will reach out when he is closer to being out.     LOLITA LAURENT RN on 8/12/2024 at 10:27 AM

## 2024-08-20 ENCOUNTER — MYC REFILL (OUTPATIENT)
Dept: ADDICTION MEDICINE | Facility: CLINIC | Age: 34
End: 2024-08-20
Payer: COMMERCIAL

## 2024-08-20 DIAGNOSIS — F41.9 ANXIETY: ICD-10-CM

## 2024-08-20 DIAGNOSIS — F51.01 PRIMARY INSOMNIA: ICD-10-CM

## 2024-08-20 DIAGNOSIS — F11.20 OPIOID USE DISORDER, SEVERE, DEPENDENCE (H): ICD-10-CM

## 2024-08-21 RX ORDER — BUPRENORPHINE AND NALOXONE 12; 3 MG/1; MG/1
1 FILM, SOLUBLE BUCCAL; SUBLINGUAL DAILY PRN
Qty: 30 FILM | Refills: 0 | Status: SHIPPED | OUTPATIENT
Start: 2024-08-21

## 2024-08-21 RX ORDER — MIRTAZAPINE 15 MG/1
15 TABLET, FILM COATED ORAL AT BEDTIME
Qty: 30 TABLET | Refills: 1 | Status: SHIPPED | OUTPATIENT
Start: 2024-08-21 | End: 2024-09-30

## 2024-08-21 NOTE — TELEPHONE ENCOUNTER
Date of Last Office Visit: 7/1/24  Date of Next Office Visit:  9/30/24  No shows since last visit: No  More than one patient-initiated cancellation (with reschedule) since last seen in clinic? No    []Medication refilled per  Medication Refill in Ambulatory Care  policy.  [x]Medication unable to be refilled by RN due to criteria not met as indicated below:    []Eligibility: has not had a provider visit within last 6 months   []Supervision: no future appointment; < 7 days before next appointment   []Compliance: no shows; cancellations; lapse in therapy   []Verification: order discrepancy; may need modification...   [] > 30-day supply request   []Advanced refill request: > 7 days before refill date   [x]Controlled medication   []Medication not included in policy   []Review: new med; med adjusted ? 30 days; safety alert; requires lab monitoring...   []Scope of Practice: refill request processed by LPN/MA   []Other:      Medication(s) requested:     -  mirtazapine (REMERON) 15 MG tablet   Date last ordered: 6/28/24  Qty: 30  Refills: 0  Appropriate for refill? Provider to review.      -  Buprenorphine HCl-Naloxone HCl (SUBOXONE) 12-3 MG FILM per film   Date last ordered: 5/20/24  Qty: 45  Refills: 1  Appropriate for refill? Provider to review.        Any Controlled Substance(s)? Yes   [place]  checked? No; not current delegate      Requested medication(s) verified as identical to current order? Yes    Any lapse in adherence to medication(s) greater than 5 days? Unknown     Additional action taken? contacted patient via phone at [phone] .Patient reports that he is due for next Sublocade on Monday, he would like to talk to provider about doing 300 mg dose.  Patient not using Mirtazapine nightly.     Patient Comment: I'm on the last week of my 100 dose shot of sublocade and dealing with discomfort. Cramps and bad rebound pain in my back and knees. Could you please also have Mandy call me about my next dose of  sublocade. Thank you.       Last visit treatment plan:   ASSESSMENT/PLAN  1. Opioid use disorder, severe, dependence (H)  Celebrating 1 year of abstinence this week. Received #3 sublocade (300 mg) today. Reports improvement to his withdrawal symptoms after receiving his second sublcoade injection. He did not require an additional buprenorphine until a week ago. Has been taking 8-16 mg daily for the last few days.   Continue monthly sublocade, starts 100 mg injection on 7/29.  Continue sl buprenorphine as needed. NO refill needed.   Confirms narcan access  Continue recovery supports     2. Alcohol use disorder, severe, dependence (H)  Denies cravings or return to use. Has 1 year without alcohol this week.   Continue recovery supports  - gabapentin (NEURONTIN) 300 MG capsule; Take 3 capsules (900 mg) by mouth 3 times daily May take an additional 900 mg as needed for anxiety  Dispense: 270 capsule; Refill: 2  - AST; Future  - ALT; Future     3. Anxiety  Symptoms managed with gabapentin and clonidine, continue.  - cloNIDine (CATAPRES) 0.1 MG tablet; Take 1-2 tablets (0.1-0.2 mg) by mouth 3 times daily as needed (anxiety)  Dispense: 180 tablet; Refill: 2  - gabapentin (NEURONTIN) 300 MG capsule; Take 3 capsules (900 mg) by mouth 3 times daily May take an additional 900 mg as needed for anxiety  Dispense: 270 capsule; Refill: 2     4. Encounter for monitoring opioid maintenance therapy  - AST; Future  - ALT; Future          Orders Placed This Encounter   Medications    cloNIDine (CATAPRES) 0.1 MG tablet       Sig: Take 1-2 tablets (0.1-0.2 mg) by mouth 3 times daily as needed (anxiety)       Dispense:  180 tablet       Refill:  2    gabapentin (NEURONTIN) 300 MG capsule       Sig: Take 3 capsules (900 mg) by mouth 3 times daily May take an additional 900 mg as needed for anxiety       Dispense:  270 capsule       Refill:  2         Problem list updated Jul 1, 2024   No problems updated.              PDMP Review            "Value Time User     [facility]  Yes 7/2/2024 12:33 PM Mandy Castillo, CNP             05/17/2024 5 Suboxone 12 Mg-3 Mg Sl Film 30.00 30 He Bat [id number] Equ (1193) 1/1 12.00 mg Comm Ins [place]   06/10/2024 05/13/2024 5 Gabapentin 300 Mg Capsule 270.00 23 He Bat 96994 Equ (11                  RTC  Return in about 13 weeks (around 9/30/2024) for Follow up, with me, in person 1130.    Any medication(s) require lab monitoring? Yes   ADDICTION MED   Last POC UDS Results:   Lab Results   Component Value Date    BUP Screen Positive (A) 05/13/2024    BZO Negative 05/13/2024    BAR Negative 05/13/2024    DELROY Negative 05/13/2024    MAMP Negative 05/13/2024    AMP Negative 05/13/2024    MDMA Negative 05/13/2024    MTD Negative 05/13/2024    HAF346 Negative 05/13/2024    OXY Negative 05/13/2024    PCP Negative 05/13/2024    THC Negative 05/13/2024    TEMP 92 F 05/13/2024    SGPOCT 1.015 05/13/2024         Last Drug Confirmation Results: No results found for: \"NALTX\", \"NALTXR\", \"UCRR\", \"GPENT\", \"FEN\", \"FENR\", \"MORPN\", \"MORPNR\", \"NFEN\", \"NFENR\", \"XYLAZINE\"    Last Drugs of Abuse Screening: No results found for: \"THC13\", \"PCP13\", \"COC13\", \"MAMP13\", \"OPI13\", \"AMP13\", \"BZO13\", \"TCA13\", \"MTD13\", \"BAR13\", \"OXY13\", \"PPX13\", \"BUP13\"     Last Fentanyl Qualitative Urine:   Fentanyl Qual Urine   Date Value Ref Range Status   12/04/2023 Screen Negative Screen Negative Final     Comment:     Cutoff for negative fentanyl is less than 5 ng/mL.        Last Ethyl Alcohol Level: No results found for: \"ETOH\"              "

## 2024-08-26 ENCOUNTER — INFUSION THERAPY VISIT (OUTPATIENT)
Dept: INFUSION THERAPY | Facility: CLINIC | Age: 34
End: 2024-08-26
Attending: NURSE PRACTITIONER
Payer: COMMERCIAL

## 2024-08-26 VITALS — DIASTOLIC BLOOD PRESSURE: 65 MMHG | SYSTOLIC BLOOD PRESSURE: 124 MMHG | HEART RATE: 45 BPM | TEMPERATURE: 98.6 F

## 2024-08-26 DIAGNOSIS — F11.20 OPIOID USE DISORDER, SEVERE, DEPENDENCE (H): Primary | ICD-10-CM

## 2024-08-26 PROCEDURE — 96372 THER/PROPH/DIAG INJ SC/IM: CPT | Performed by: NURSE PRACTITIONER

## 2024-08-26 PROCEDURE — 250N000009 HC RX 250: Performed by: NURSE PRACTITIONER

## 2024-08-26 PROCEDURE — 250N000011 HC RX IP 250 OP 636: Performed by: NURSE PRACTITIONER

## 2024-08-26 RX ORDER — ALBUTEROL SULFATE 0.83 MG/ML
2.5 SOLUTION RESPIRATORY (INHALATION)
Status: CANCELLED | OUTPATIENT
Start: 2024-09-23

## 2024-08-26 RX ORDER — ALBUTEROL SULFATE 90 UG/1
1-2 AEROSOL, METERED RESPIRATORY (INHALATION)
Status: CANCELLED
Start: 2024-09-23

## 2024-08-26 RX ORDER — METHYLPREDNISOLONE SODIUM SUCCINATE 125 MG/2ML
125 INJECTION, POWDER, LYOPHILIZED, FOR SOLUTION INTRAMUSCULAR; INTRAVENOUS
Status: CANCELLED
Start: 2024-09-23

## 2024-08-26 RX ORDER — LIDOCAINE HYDROCHLORIDE 10 MG/ML
2 INJECTION, SOLUTION EPIDURAL; INFILTRATION; INTRACAUDAL; PERINEURAL ONCE
Status: CANCELLED | OUTPATIENT
Start: 2024-09-23 | End: 2024-09-23

## 2024-08-26 RX ORDER — DIPHENHYDRAMINE HYDROCHLORIDE 50 MG/ML
50 INJECTION INTRAMUSCULAR; INTRAVENOUS
Status: CANCELLED
Start: 2024-09-23

## 2024-08-26 RX ORDER — LIDOCAINE HYDROCHLORIDE 10 MG/ML
2 INJECTION, SOLUTION EPIDURAL; INFILTRATION; INTRACAUDAL; PERINEURAL ONCE
Status: COMPLETED | OUTPATIENT
Start: 2024-08-26 | End: 2024-08-26

## 2024-08-26 RX ORDER — MEPERIDINE HYDROCHLORIDE 25 MG/ML
25 INJECTION INTRAMUSCULAR; INTRAVENOUS; SUBCUTANEOUS EVERY 30 MIN PRN
Status: CANCELLED | OUTPATIENT
Start: 2024-09-23

## 2024-08-26 RX ORDER — EPINEPHRINE 1 MG/ML
0.3 INJECTION, SOLUTION, CONCENTRATE INTRAVENOUS EVERY 5 MIN PRN
Status: CANCELLED | OUTPATIENT
Start: 2024-09-23

## 2024-08-26 RX ADMIN — LIDOCAINE HYDROCHLORIDE 2 ML: 10 INJECTION, SOLUTION EPIDURAL; INFILTRATION; INTRACAUDAL; PERINEURAL at 12:14

## 2024-08-26 RX ADMIN — BUPRENORPHINE 100 MG: 100 SOLUTION SUBCUTANEOUS at 12:23

## 2024-08-26 ASSESSMENT — PAIN SCALES - GENERAL: PAINLEVEL: NO PAIN (0)

## 2024-08-26 NOTE — PROGRESS NOTES
Infusion Nursing Note:  Troy Mccoy presents today for sublocade 100 mg injection.    Patient seen by provider today: No   present during visit today: Not Applicable.    Note: Had trouble with withdrawal symptoms but was able to speak to Jonathan and get suboxone, which relieved them.  Also: noted to have low heart rate which is irregular radially.  Denies dizzines, syncope.      Intravenous Access:  No Intravenous access/labs at this visit.    Treatment Conditions:  Denies relapse. No S/S infection or tampering at previous injection sites.      Post Infusion Assessment:  Patient tolerated injection without incident.  Given in LLQ after 2ml subcutaneous xylocaine administered for local anesthesia.       Discharge Plan:   Patient discharged in stable condition accompanied by: self.  Departure Mode: Ambulatory.  RTC 9/23/24.      Stephanie Smith     Detail Level: Generalized

## 2024-09-01 ENCOUNTER — MYC REFILL (OUTPATIENT)
Dept: ADDICTION MEDICINE | Facility: CLINIC | Age: 34
End: 2024-09-01
Payer: COMMERCIAL

## 2024-09-01 DIAGNOSIS — F41.9 ANXIETY: ICD-10-CM

## 2024-09-03 RX ORDER — CLONIDINE HYDROCHLORIDE 0.1 MG/1
.1-.2 TABLET ORAL 3 TIMES DAILY PRN
Qty: 180 TABLET | Refills: 0 | Status: SHIPPED | OUTPATIENT
Start: 2024-09-03 | End: 2024-09-30

## 2024-09-03 NOTE — TELEPHONE ENCOUNTER
Date of Last Office Visit: 7/1/2024  Date of Next Office Visit:  9/30/2024  No shows since last visit: No  More than one patient-initiated cancellation (with reschedule) since last seen in clinic? No    []Medication refilled per  Medication Refill in Ambulatory Care  policy.  [x]Medication unable to be refilled by RN due to criteria not met as indicated below:    []Eligibility: has not had a provider visit within last 6 months   []Supervision: no future appointment; < 7 days before next appointment   []Compliance: no shows; cancellations; lapse in therapy   []Verification: order discrepancy; may need modification...   [] > 30-day supply request   []Advanced refill request: > 7 days before refill date   []Controlled medication   []Medication not included in policy   []Review: new med; med adjusted ? 30 days; safety alert; requires lab monitoring...   []Scope of Practice: refill request processed by LPN/MA   [x]Other: - patient lost medication       Medication(s) requested:   -  clonidine (CATAPRES) 0.1 MG tablet   Date last ordered: 7/1/2024  Qty: 180  Refills: 2  Take 1-2 tablets (0.1-0.2 mg) by mouth 3 times daily as needed (anxiety)     Appropriate for refill? Provider to review.          Any Controlled Substance(s)? No      Requested medication(s) verified as identical to current order? Yes    Any lapse in adherence to medication(s) greater than 5 days? N/A     Additional action taken? routed encounter to provider for review.      Last visit treatment plan:   ASSESSMENT/PLAN  1. Opioid use disorder, severe, dependence (H)  Celebrating 1 year of abstinence this week. Received #3 sublocade (300 mg) today. Reports improvement to his withdrawal symptoms after receiving his second sublcoade injection. He did not require an additional buprenorphine until a week ago. Has been taking 8-16 mg daily for the last few days.   Continue monthly sublocade, starts 100 mg injection on 7/29.  Continue sl buprenorphine as needed.  NO refill needed.   Confirms narcan access  Continue recovery supports     2. Alcohol use disorder, severe, dependence (H)  Denies cravings or return to use. Has 1 year without alcohol this week.   Continue recovery supports  - gabapentin (NEURONTIN) 300 MG capsule; Take 3 capsules (900 mg) by mouth 3 times daily May take an additional 900 mg as needed for anxiety  Dispense: 270 capsule; Refill: 2  - AST; Future  - ALT; Future     3. Anxiety  Symptoms managed with gabapentin and clonidine, continue.  - cloNIDine (CATAPRES) 0.1 MG tablet; Take 1-2 tablets (0.1-0.2 mg) by mouth 3 times daily as needed (anxiety)  Dispense: 180 tablet; Refill: 2  - gabapentin (NEURONTIN) 300 MG capsule; Take 3 capsules (900 mg) by mouth 3 times daily May take an additional 900 mg as needed for anxiety  Dispense: 270 capsule; Refill: 2     4. Encounter for monitoring opioid maintenance therapy  - AST; Future  - ALT; Future          Orders Placed This Encounter   Medications    cloNIDine (CATAPRES) 0.1 MG tablet       Sig: Take 1-2 tablets (0.1-0.2 mg) by mouth 3 times daily as needed (anxiety)       Dispense:  180 tablet       Refill:  2    gabapentin (NEURONTIN) 300 MG capsule       Sig: Take 3 capsules (900 mg) by mouth 3 times daily May take an additional 900 mg as needed for anxiety       Dispense:  270 capsule       Refill:  2         Problem list updated Jul 1, 2024   No problems updated.              PDMP Review           Value Time User     State PDMP site checked  Yes 7/2/2024 12:33 PM Mandy Castillo CNP             05/17/2024 5 Suboxone 12 Mg-3 Mg Sl Film 30.00 30 He Bat 97649 Equ (1193) 1/1 12.00 mg Comm Ins [place]   06/10/2024 05/13/2024 5 Gabapentin 300 Mg Capsule 270.00 23 He Bat 30235 Equ (11                  RTC  Return in about 13 weeks (around 9/30/2024) for Follow up, with me, in person 1130.       Any medication(s) require lab monitoring? No

## 2024-09-23 ENCOUNTER — INFUSION THERAPY VISIT (OUTPATIENT)
Dept: INFUSION THERAPY | Facility: CLINIC | Age: 34
End: 2024-09-23
Attending: NURSE PRACTITIONER
Payer: COMMERCIAL

## 2024-09-23 VITALS
HEART RATE: 69 BPM | DIASTOLIC BLOOD PRESSURE: 80 MMHG | SYSTOLIC BLOOD PRESSURE: 133 MMHG | OXYGEN SATURATION: 96 % | TEMPERATURE: 97.7 F | RESPIRATION RATE: 16 BRPM

## 2024-09-23 DIAGNOSIS — F11.20 OPIOID USE DISORDER, SEVERE, DEPENDENCE (H): Primary | ICD-10-CM

## 2024-09-23 PROCEDURE — 96372 THER/PROPH/DIAG INJ SC/IM: CPT | Performed by: NURSE PRACTITIONER

## 2024-09-23 PROCEDURE — 250N000011 HC RX IP 250 OP 636: Performed by: NURSE PRACTITIONER

## 2024-09-23 PROCEDURE — 250N000009 HC RX 250: Performed by: NURSE PRACTITIONER

## 2024-09-23 RX ORDER — LIDOCAINE HYDROCHLORIDE 10 MG/ML
2 INJECTION, SOLUTION EPIDURAL; INFILTRATION; INTRACAUDAL; PERINEURAL ONCE
OUTPATIENT
Start: 2024-10-21 | End: 2024-10-21

## 2024-09-23 RX ORDER — LIDOCAINE HYDROCHLORIDE 10 MG/ML
2 INJECTION, SOLUTION EPIDURAL; INFILTRATION; INTRACAUDAL; PERINEURAL ONCE
Status: COMPLETED | OUTPATIENT
Start: 2024-09-23 | End: 2024-09-23

## 2024-09-23 RX ORDER — EPINEPHRINE 1 MG/ML
0.3 INJECTION, SOLUTION, CONCENTRATE INTRAVENOUS EVERY 5 MIN PRN
OUTPATIENT
Start: 2024-10-21

## 2024-09-23 RX ORDER — ALBUTEROL SULFATE 0.83 MG/ML
2.5 SOLUTION RESPIRATORY (INHALATION)
OUTPATIENT
Start: 2024-10-21

## 2024-09-23 RX ORDER — MEPERIDINE HYDROCHLORIDE 25 MG/ML
25 INJECTION INTRAMUSCULAR; INTRAVENOUS; SUBCUTANEOUS EVERY 30 MIN PRN
OUTPATIENT
Start: 2024-10-21

## 2024-09-23 RX ORDER — ALBUTEROL SULFATE 90 UG/1
1-2 AEROSOL, METERED RESPIRATORY (INHALATION)
Start: 2024-10-21

## 2024-09-23 RX ORDER — DIPHENHYDRAMINE HYDROCHLORIDE 50 MG/ML
50 INJECTION INTRAMUSCULAR; INTRAVENOUS
Start: 2024-10-21

## 2024-09-23 RX ORDER — METHYLPREDNISOLONE SODIUM SUCCINATE 125 MG/2ML
125 INJECTION, POWDER, LYOPHILIZED, FOR SOLUTION INTRAMUSCULAR; INTRAVENOUS
Start: 2024-10-21

## 2024-09-23 RX ADMIN — BUPRENORPHINE 100 MG: 100 SOLUTION SUBCUTANEOUS at 12:30

## 2024-09-23 RX ADMIN — LIDOCAINE HYDROCHLORIDE 2 ML: 10 INJECTION, SOLUTION EPIDURAL; INFILTRATION; INTRACAUDAL; PERINEURAL at 12:26

## 2024-09-23 ASSESSMENT — PAIN SCALES - GENERAL: PAINLEVEL: NO PAIN (0)

## 2024-09-23 NOTE — PROGRESS NOTES
Infusion Nursing Note:  Troy Mccoy presents today for sublocade 100mg.    Patient seen by provider today: No   present during visit today: Not Applicable.    Note: Patient denies relapse or withdrawal/cravings.  No evidence of tampering noted.      Intravenous Access:  No Intravenous access/labs at this visit.    Treatment Conditions:  Not Applicable.      Post Infusion Assessment:  Patient tolerated injection without incident.  Lidocaine given prior to sublocade.  Sublocade injection given in the same trajectory without difficulty.  RLQ      Discharge Plan:   Patient discharged in stable condition accompanied by: self.  Departure Mode: Ambulatory.  Will return to clinic in 4 weeks, appointment scheduled.    Chapis Chun RN

## 2024-09-30 ENCOUNTER — LAB (OUTPATIENT)
Dept: LAB | Facility: CLINIC | Age: 34
End: 2024-09-30
Payer: COMMERCIAL

## 2024-09-30 ENCOUNTER — OFFICE VISIT (OUTPATIENT)
Dept: ADDICTION MEDICINE | Facility: CLINIC | Age: 34
End: 2024-09-30
Payer: COMMERCIAL

## 2024-09-30 VITALS
HEART RATE: 50 BPM | SYSTOLIC BLOOD PRESSURE: 144 MMHG | DIASTOLIC BLOOD PRESSURE: 82 MMHG | WEIGHT: 315 LBS | BODY MASS INDEX: 39.17 KG/M2 | HEIGHT: 75 IN

## 2024-09-30 DIAGNOSIS — Z79.891 ENCOUNTER FOR MONITORING OPIOID MAINTENANCE THERAPY: ICD-10-CM

## 2024-09-30 DIAGNOSIS — F11.21 OPIOID USE DISORDER, SEVERE, IN SUSTAINED REMISSION (H): Primary | ICD-10-CM

## 2024-09-30 DIAGNOSIS — T40.2X5A THERAPEUTIC OPIOID-INDUCED CONSTIPATION (OIC): ICD-10-CM

## 2024-09-30 DIAGNOSIS — F41.9 ANXIETY: ICD-10-CM

## 2024-09-30 DIAGNOSIS — F51.01 PRIMARY INSOMNIA: ICD-10-CM

## 2024-09-30 DIAGNOSIS — F10.21 ALCOHOL USE DISORDER, SEVERE, IN SUSTAINED REMISSION (H): ICD-10-CM

## 2024-09-30 DIAGNOSIS — F10.20 ALCOHOL USE DISORDER, SEVERE, DEPENDENCE (H): ICD-10-CM

## 2024-09-30 DIAGNOSIS — Z51.81 ENCOUNTER FOR MONITORING OPIOID MAINTENANCE THERAPY: ICD-10-CM

## 2024-09-30 DIAGNOSIS — K59.03 THERAPEUTIC OPIOID-INDUCED CONSTIPATION (OIC): ICD-10-CM

## 2024-09-30 LAB
ALT SERPL W P-5'-P-CCNC: 29 U/L (ref 0–70)
AMPHETAMINE QUAL URINE POCT: NEGATIVE
AST SERPL W P-5'-P-CCNC: 39 U/L (ref 0–45)
BARBITURATE QUAL URINE POCT: NEGATIVE
BENZODIAZEPINE QUAL URINE POCT: NEGATIVE
BUPRENORPHINE QUAL URINE POCT: ABNORMAL
COCAINE QUAL URINE POCT: NEGATIVE
CREAT UR-MCNC: 97 MG/DL
CREATININE QUAL URINE POCT: ABNORMAL
INTERNAL QC QUAL URINE POCT: ABNORMAL
MDMA QUAL URINE POCT: NEGATIVE
METHADONE QUAL URINE POCT: NEGATIVE
METHAMPHETAMINE QUAL URINE POCT: NEGATIVE
OPIATE QUAL URINE POCT: NEGATIVE
OXYCODONE QUAL URINE POCT: NEGATIVE
PH QUAL URINE POCT: ABNORMAL
PHENCYCLIDINE QUAL URINE POCT: NEGATIVE
POCT KIT EXPIRATION DATE: ABNORMAL
POCT KIT LOT NUMBER: ABNORMAL
SPECIFIC GRAVITY POCT: 1.01
TEMPERATURE URINE POCT: ABNORMAL
THC QUAL URINE POCT: NEGATIVE

## 2024-09-30 PROCEDURE — 84450 TRANSFERASE (AST) (SGOT): CPT

## 2024-09-30 PROCEDURE — 36415 COLL VENOUS BLD VENIPUNCTURE: CPT

## 2024-09-30 PROCEDURE — 84460 ALANINE AMINO (ALT) (SGPT): CPT

## 2024-09-30 RX ORDER — MIRTAZAPINE 15 MG/1
15 TABLET, FILM COATED ORAL AT BEDTIME
Qty: 30 TABLET | Refills: 2 | Status: SHIPPED | OUTPATIENT
Start: 2024-09-30

## 2024-09-30 RX ORDER — POLYETHYLENE GLYCOL 3350 17 G/17G
1 POWDER, FOR SOLUTION ORAL 2 TIMES DAILY PRN
Qty: 578 G | Refills: 11 | Status: SHIPPED | OUTPATIENT
Start: 2024-09-30

## 2024-09-30 RX ORDER — GABAPENTIN 300 MG/1
900 CAPSULE ORAL 3 TIMES DAILY
Qty: 270 CAPSULE | Refills: 2 | Status: SHIPPED | OUTPATIENT
Start: 2024-09-30

## 2024-09-30 RX ORDER — CLONIDINE HYDROCHLORIDE 0.1 MG/1
.1-.2 TABLET ORAL 3 TIMES DAILY PRN
Qty: 180 TABLET | Refills: 2 | Status: SHIPPED | OUTPATIENT
Start: 2024-09-30

## 2024-09-30 ASSESSMENT — ANXIETY QUESTIONNAIRES
1. FEELING NERVOUS, ANXIOUS, OR ON EDGE: SEVERAL DAYS
4. TROUBLE RELAXING: SEVERAL DAYS
GAD7 TOTAL SCORE: 3
5. BEING SO RESTLESS THAT IT IS HARD TO SIT STILL: NOT AT ALL
GAD7 TOTAL SCORE: 3
2. NOT BEING ABLE TO STOP OR CONTROL WORRYING: NOT AT ALL
7. FEELING AFRAID AS IF SOMETHING AWFUL MIGHT HAPPEN: NOT AT ALL
3. WORRYING TOO MUCH ABOUT DIFFERENT THINGS: NOT AT ALL
8. IF YOU CHECKED OFF ANY PROBLEMS, HOW DIFFICULT HAVE THESE MADE IT FOR YOU TO DO YOUR WORK, TAKE CARE OF THINGS AT HOME, OR GET ALONG WITH OTHER PEOPLE?: SOMEWHAT DIFFICULT
GAD7 TOTAL SCORE: 3
7. FEELING AFRAID AS IF SOMETHING AWFUL MIGHT HAPPEN: NOT AT ALL
6. BECOMING EASILY ANNOYED OR IRRITABLE: SEVERAL DAYS
IF YOU CHECKED OFF ANY PROBLEMS ON THIS QUESTIONNAIRE, HOW DIFFICULT HAVE THESE PROBLEMS MADE IT FOR YOU TO DO YOUR WORK, TAKE CARE OF THINGS AT HOME, OR GET ALONG WITH OTHER PEOPLE: SOMEWHAT DIFFICULT

## 2024-09-30 ASSESSMENT — PATIENT HEALTH QUESTIONNAIRE - PHQ9
SUM OF ALL RESPONSES TO PHQ QUESTIONS 1-9: 3
10. IF YOU CHECKED OFF ANY PROBLEMS, HOW DIFFICULT HAVE THESE PROBLEMS MADE IT FOR YOU TO DO YOUR WORK, TAKE CARE OF THINGS AT HOME, OR GET ALONG WITH OTHER PEOPLE: NOT DIFFICULT AT ALL
SUM OF ALL RESPONSES TO PHQ QUESTIONS 1-9: 3

## 2024-09-30 NOTE — PROGRESS NOTES
[facility] - Addiction Medicine       Rooming information: Has been getting Sublocade shots, says he hasn't been using any SL Suboxone for the past couple mos    Point of care urine drug screen positive for:  Lab Results   Component Value Date    BUP Screen Positive (A) 09/30/2024    BZO Negative 09/30/2024    BAR Negative 09/30/2024    DELROY Negative 09/30/2024    MAMP Negative 09/30/2024    AMP Negative 09/30/2024    MDMA Negative 09/30/2024    MTD Negative 09/30/2024    HZO398 Negative 09/30/2024    OXY Negative 09/30/2024    PCP Negative 09/30/2024    THC Negative 09/30/2024    TEMP 90 F 09/30/2024    SGPOCT 1.015 09/30/2024       *POC urine drug screen does not screen for Fentanyl    PHQ-9 Scores:       3/25/2024    10:51 AM 5/13/2024     1:31 PM 9/30/2024     9:37 AM   PHQ   PHQ-9 Total Score 4 7 3   Q9: Thoughts of better off dead/self-harm past 2 weeks Not at all Not at all Not at all     ZENIA-7 Scores:      3/25/2024    10:52 AM 5/13/2024     1:31 PM 9/30/2024     9:38 AM   ZENIA-7 SCORE   Total Score 4 (minimal anxiety) 10 (moderate anxiety) 3 (minimal anxiety)   Total Score 4 10 3       Any other recent substance use:     Denies    NICOTINE-Yes: vape  If using nicotine, ready to quit? No    Side effects related to medications pt would like to discuss with provider (constipation, dry mouth, HA, GI upset, sedation?) No     Narcan currently available: Yes    Primary care provider: [facility] provider: none (follow up on MH referral if needed)    Any housing, insurance deficits?: stable    Contact information up to date? yes    [employer] NA (please obtain LILIAN if pt would like to include)      Juliana Caraballo LPN  September 30, 2024  11:25 AM

## 2024-09-30 NOTE — PROGRESS NOTES
[facility] Addiction Medicine    A/P                                                    ASSESSMENT/PLAN  1. Opioid use disorder, severe, in sustained remission (H)  Stable in sustained remission since 7/2/2023 on sublocade. Received sublocade #6 last week. Is reporting symptoms are controlled and does not need additional buprenorphine.   Continue monthly sublocade  Encouraged to complete LFts today.   Continue recovery supports    2. Encounter for monitoring opioid maintenance therapy  - Drugs of Abuse Screen Urine (POC CUPS) POCT  - Drug Confirmation Panel Urine with Creat - lab collect; Future  - Drug Confirmation Panel Urine with Creat - lab collect    3. Alcohol use disorder, severe, in sustained remission (H)  Stable,  in remission since 7/2/2023/   Continue recovery supports  - gabapentin (NEURONTIN) 300 MG capsule; Take 3 capsules (900 mg) by mouth 3 times daily. May take an additional 900 mg as needed for anxiety  Dispense: 270 capsule; Refill: 2    4. Anxiety  Anxiety managed with clonidine and gabapentin, continue  - gabapentin (NEURONTIN) 300 MG capsule; Take 3 capsules (900 mg) by mouth 3 times daily. May take an additional 900 mg as needed for anxiety  Dispense: 270 capsule; Refill: 2  - cloNIDine (CATAPRES) 0.1 MG tablet; Take 1-2 tablets (0.1-0.2 mg) by mouth 3 times daily as needed (anxiety).  Dispense: 180 tablet; Refill: 2  - mirtazapine (REMERON) 15 MG tablet; Take 1 tablet (15 mg) by mouth at bedtime.  Dispense: 30 tablet; Refill: 2    5. Primary insomnia  Sleeping well with mirtazapine, continue  - mirtazapine (REMERON) 15 MG tablet; Take 1 tablet (15 mg) by mouth at bedtime.  Dispense: 30 tablet; Refill: 2    6. Therapeutic opioid-induced constipation (OIC)  Constipation managed with miralax  - polyethylene glycol (MIRALAX) 17 GM/Dose powder; Take 17 g (1 Capful) by mouth 2 times daily as needed for constipation.  Dispense: 578 g; Refill: 11  - Fiber 500 MG CAPS; Take 2 capsules by  mouth daily.  Dispense: 60 capsule; Refill: 2    Orders Placed This Encounter   Medications    gabapentin (NEURONTIN) 300 MG capsule     Sig: Take 3 capsules (900 mg) by mouth 3 times daily. May take an additional 900 mg as needed for anxiety     Dispense:  270 capsule     Refill:  2    cloNIDine (CATAPRES) 0.1 MG tablet     Sig: Take 1-2 tablets (0.1-0.2 mg) by mouth 3 times daily as needed (anxiety).     Dispense:  180 tablet     Refill:  2    mirtazapine (REMERON) 15 MG tablet     Sig: Take 1 tablet (15 mg) by mouth at bedtime.     Dispense:  30 tablet     Refill:  2    polyethylene glycol (MIRALAX) 17 GM/Dose powder     Sig: Take 17 g (1 Capful) by mouth 2 times daily as needed for constipation.     Dispense:  578 g     Refill:  11    Fiber 500 MG CAPS     Sig: Take 2 capsules by mouth daily.     Dispense:  60 capsule     Refill:  2       Problem list updated Sep 30, 2024   No problems updated.          PDMP Review         Value Time User    State PDMP site checked  Yes 8/21/2024  5:28 PM Mandy Castillo CNP              RTC  Return in about 3 months (around 12/30/2024) for Follow up, using a video visit 1100.      Counseled the patient on the importance of having a recovery program in addition to medication to manage recovery.  Components include avoiding isolating, having willingness to change, avoiding triggers and managing cravings. Encouraged having some type of sober network and practicing honesty with trusted support person(s). Encouraged other services such as counseling, 12 step or other self-help organizations.      Opioid warning reviewed.  Risk of overdose following a period of abstinence due to decrease tolerance was discussed including risk of death.  Strongly recommended abstain from alcohol, benzodiazepines, THC, opioids and other drugs of abuse.  Increased risk of return to opioid use after use of these substances discussed.  Increased risk of overdose/death with use of other substances  "particularly benzodiazepines/alcohol reviewed.        SUBJECTIVE                                                    Troy Mccoy is a 33 year old male with PMH  HX of hepatitis C s/p treatment with [facility] 2022, seizures related to alcohol withdrawal, chronic LBP, anxiety,  alcohol use disorder, methamphetamine use disorder, and opioid use disorder on buprenorphine who presents for return visit.      Brief History:  [facility]. They were referred by staff at [facility] to assist pt in starting buprenorphine.  He has been using heroin/fentnayl 0.5-1 gram daily, last use was 7/2/23. He has history of daily IV meth use, last use 2018, and history of drinking 1 gallon of vodka daily until 2018 when he was hospitalized and in ICU for seizures. He maintained 1 year of abstinence, and returned to alcohol use. He began using fentanyl/heroin IV Summer 2022,  and decreased his alcohol use to \"few drinks\", couple times per month. He entered residential treatment at [facility], was started on Suboxone at initial visit.   Returned to  9/12/23 after graduating from [facility].  Had been prescribed buprenorphine by Elías Read NP while in [facility], unable to continue after graduation due to distance to [place] for in person visits.  Started OP with Edith 9/2023.   10/10/23 buprenorphine increased to 32mg TDD.   [facility]  Sublocade initiated 5/6/2024 5/6/24 #1 300 mg  6/3/24 #2 300 mg  7/1/2024 # 3 300 mg  7/29/2024 #4 100 mg  8/26/2024 #5 100 mg  9/23/2024 #6 100 mg            Substance Use History :  Opioids:   Age at first use: 14-15 years.   Current use: substance: fentanyl, heroin; quantity 0.5-1 gram daily route: inhaled and IV; timing of last use: approx week of 7/2/223 ;                 IV drug use: Yes:   History of overdose: Yes: 12 times  Previous residential or outpatient treatments for " addiction : Yes: [facility] 7/12/23  Previous medication treatments for addiction: No  Longest period of sobriety: 1 year, 2018  Medical complications related to substance use: no  Hepatitis C: Received treatment with [facility] 2022; 7/20/23 HCV RNA not detected  HIV: 7/20/23 HIV ag/ab nonreactive     Other Addiction History:  Stimulants   Meth IV, hx of daily use before 2018. Last use was in 2018  Sedatives/hypnotics/anxiolytics:   BDZ occasional  Alcohol:   Yes, HX of drinking 1 gallon a day of vodka until 2018. Since then drinking few drinks couple times monthly.   Nicotine:   vaping  Cannabis:   Yes, smokes couple of hits here and there.   Hallucinogens/Dissociatives:   Yes, none recently  Eating disorder:  no  Gambling:              no        PAST PSYCHIATRIC HISTORY:  Diagnoses- anxiety  Suicide Attempts: Yes  approx 2021  Hospitalizations: Yes 2021     Social History  Housing status: sober house  Employment status: Unemployed, not seeking work  Relationship status: Single  Children: no children  Legal: none  Insurance needs: active  Contact information up to date? yes          Recent HPI Details:7/1/2024  1. Opioid use disorder, severe, dependence (H)  Celebrating 1 year of abstinence this week. Received #3 sublocade (300 mg) today. Reports improvement to his withdrawal symptoms after receiving his second sublcoade injection. He did not require an additional buprenorphine until a week ago. Has been taking 8-16 mg daily for the last few days.   Continue monthly sublocade, starts 100 mg injection on 7/29.  Continue sl buprenorphine as needed. NO refill needed.   Confirms narcan access  Continue recovery supports     2. Alcohol use disorder, severe, dependence (H)  Denies cravings or return to use. Has 1 year without alcohol this week.   Continue recovery supports  - gabapentin (NEURONTIN) 300 MG capsule; Take 3 capsules (900 mg) by mouth 3 times daily May take an additional 900 mg as needed for anxiety   "Dispense: 270 capsule; Refill: 2  - AST; Future  - ALT; Future     3. Anxiety  Symptoms managed with gabapentin and clonidine, continue.  - cloNIDine (CATAPRES) 0.1 MG tablet; Take 1-2 tablets (0.1-0.2 mg) by mouth 3 times daily as needed (anxiety)  Dispense: 180 tablet; Refill: 2  - gabapentin (NEURONTIN) 300 MG capsule; Take 3 capsules (900 mg) by mouth 3 times daily May take an additional 900 mg as needed for anxiety  Dispense: 270 capsule; Refill: 2     4. Encounter for monitoring opioid maintenance therapy  - AST; Future  - ALT; Future    TODAY'S VISIT  HPI Sep 30, 2024    Got his DL back and bought a car.   Working part time.   Still going to meetings weekly.   Playing disc golf.   Still living in sober house.   Received hs#6 sublcoadfe 9/23. Is reporting symptoms are controlled.             3/25/2024    10:51 AM 5/13/2024     1:31 PM 9/30/2024     9:37 AM   PHQ   PHQ-9 Total Score 4 7 3   Q9: Thoughts of better off dead/self-harm past 2 weeks Not at all Not at all Not at all       OBJECTIVE                                                    PHYSICAL EXAM:  BP (!) 144/82 (BP Location: Left arm, Patient Position: Sitting, Cuff Size: Adult Large)   Pulse 50   Ht 1.905 m (6' 3\")   Wt 148.3 kg (327 lb)   BMI 40.87 kg/m        Physical Exam  Cardiovascular:      Rate and Rhythm: Normal rate.   Pulmonary:      Effort: Pulmonary effort is normal. No respiratory distress.   Neurological:      General: No focal deficit present.      Mental Status: He is alert and oriented to person, place, and time.   Psychiatric:         Attention and Perception: Attention normal.         Mood and Affect: Mood normal.         Speech: Speech normal.         Behavior: Behavior is cooperative.         Thought Content: Thought content normal. Thought content does not include suicidal ideation.         Judgment: Judgment normal.         LAB  Results for orders placed or performed in visit on 09/30/24   Drugs of Abuse Screen Urine (POC " CUPS) POCT     Status: Abnormal   Result Value Ref Range    POCT Kit Lot Number [id number]     POCT Kit Expiration Date 2026-02-28     Temperature Urine POCT 90 F 90 F, 92 F, 94 F, 96 F, 98 F, 100 F    Specific [place] POCT 1.015 1.005, 1.015, 1.025    pH Qual Urine POCT 7 pH 4 pH, 5 pH, 7 pH, 9 pH    Creatinine Qual Urine POCT 50 mg/dL 20 mg/dL, 50 mg/dL, 100 mg/dL, 200 mg/dL    Internal QC Qual Urine POCT Valid Valid    Amphetamine Qual Urine POCT Negative Negative    Barbiturate Qual Urine POCT Negative Negative    Buprenorphine Qual Urine POCT Screen Positive (A) Negative    Benzodiazepine Qual Urine POCT Negative Negative    Cocaine Qual Urine POCT Negative Negative    Methamphetamine Qual Urine POCT Negative Negative    MDMA Qual Urine POCT Negative Negative    Methadone Qual Urine POCT Negative Negative    Opiate Qual Urine POCT Negative Negative    Oxycodone Qual Urine POCT Negative Negative    Phencyclidine Qual Urine POCT Negative Negative    THC Qual Urine POCT Negative Negative   Drug Confirmation Panel Urine with Creat - lab collect     Status: None ()    Narrative    The following orders were created for panel order Drug Confirmation Panel Urine with Creat - lab collect.  Procedure                               Abnormality         Status                     ---------                               -----------         ------                     Urine Drug Confirmation ...[[id number]]                                                 Urine Creatinine for [place]...[[id number]]                                                   Please view results for these tests on the individual orders.         HISTORY                                                    Problem list reviewed & adjusted, as indicated.  Patient Active Problem List   Diagnosis    Opioid use disorder, severe, dependence (H)         MEDICATION LIST (prior to visit)  Current Outpatient Medications   Medication Sig Dispense Refill    cloNIDine (CATAPRES)  0.1 MG tablet Take 1-2 tablets (0.1-0.2 mg) by mouth 3 times daily as needed (anxiety). 180 tablet 2    Fiber 500 MG CAPS Take 2 capsules by mouth daily. 60 capsule 2    gabapentin (NEURONTIN) 300 MG capsule Take 3 capsules (900 mg) by mouth 3 times daily. May take an additional 900 mg as needed for anxiety 270 capsule 2    mirtazapine (REMERON) 15 MG tablet Take 1 tablet (15 mg) by mouth at bedtime. 30 tablet 2    polyethylene glycol (MIRALAX) 17 GM/Dose powder Take 17 g (1 Capful) by mouth 2 times daily as needed for constipation. 578 g 11    Buprenorphine HCl-Naloxone HCl (SUBOXONE) 12-3 MG FILM per film Place 1 Film under the tongue daily as needed (withdrawal cravings). (Patient not taking: Reported on 9/23/2024) 30 Film 0    buprenorphine HCl-naloxone HCl (SUBOXONE) 8-2 MG per film Place 1 Film under the tongue daily (Patient not taking: Reported on 9/30/2024) 30 Film 1    naloxone (NARCAN) 4 MG/0.1ML nasal spray Spray 1 spray (4 mg) into one nostril alternating nostrils as needed for opioid reversal every 2-3 minutes until assistance arrives (Patient not taking: Reported on 5/6/2024) 0.2 mL 11     No current facility-administered medications for this visit.       MEDICATION LIST (after visit)  Current Outpatient Medications   Medication Sig Dispense Refill    cloNIDine (CATAPRES) 0.1 MG tablet Take 1-2 tablets (0.1-0.2 mg) by mouth 3 times daily as needed (anxiety). 180 tablet 2    Fiber 500 MG CAPS Take 2 capsules by mouth daily. 60 capsule 2    gabapentin (NEURONTIN) 300 MG capsule Take 3 capsules (900 mg) by mouth 3 times daily. May take an additional 900 mg as needed for anxiety 270 capsule 2    mirtazapine (REMERON) 15 MG tablet Take 1 tablet (15 mg) by mouth at bedtime. 30 tablet 2    polyethylene glycol (MIRALAX) 17 GM/Dose powder Take 17 g (1 Capful) by mouth 2 times daily as needed for constipation. 578 g 11    Buprenorphine HCl-Naloxone HCl (SUBOXONE) 12-3 MG FILM per film Place 1 Film under the  tongue daily as needed (withdrawal cravings). (Patient not taking: Reported on 9/23/2024) 30 Film 0    buprenorphine HCl-naloxone HCl (SUBOXONE) 8-2 MG per film Place 1 Film under the tongue daily (Patient not taking: Reported on 9/30/2024) 30 Film 1    naloxone (NARCAN) 4 MG/0.1ML nasal spray Spray 1 spray (4 mg) into one nostril alternating nostrils as needed for opioid reversal every 2-3 minutes until assistance arrives (Patient not taking: Reported on 5/6/2024) 0.2 mL 11     No current facility-administered medications for this visit.         Allergies   Allergen Reactions    Cephalosporins Hives    Cefaclor Rash        Continued Complex Management  The longitudinal plan of care for Opioid Use Disorder (OUD) and Alcohol Use Disorder (AUD) was addressed during this visit. Due to the added complexity in care, I will continue to support Troy in the subsequent management and with ongoing continuity of care.          Mandy Castillo DNP,MSN, AGNP-C  [facility]   [address], [address] [place] [id number]   Phone # [phone]

## 2024-10-02 LAB
BUPRENORPHINE UR CFM-MCNC: 231 NG/ML
BUPRENORPHINE/CREAT UR: 238 NG/MG {CREAT}
GABAPENTIN UR QL CFM: PRESENT
NORBUPRENORPHINE UR CFM-MCNC: 220 NG/ML
NORBUPRENORPHINE/CREAT UR: 227 NG/MG {CREAT}

## 2024-10-21 ENCOUNTER — INFUSION THERAPY VISIT (OUTPATIENT)
Dept: INFUSION THERAPY | Facility: CLINIC | Age: 34
End: 2024-10-21
Attending: NURSE PRACTITIONER
Payer: COMMERCIAL

## 2024-10-21 VITALS
OXYGEN SATURATION: 97 % | RESPIRATION RATE: 18 BRPM | TEMPERATURE: 98.2 F | HEART RATE: 74 BPM | DIASTOLIC BLOOD PRESSURE: 81 MMHG | SYSTOLIC BLOOD PRESSURE: 134 MMHG

## 2024-10-21 DIAGNOSIS — F11.20 OPIOID USE DISORDER, SEVERE, DEPENDENCE (H): Primary | ICD-10-CM

## 2024-10-21 PROCEDURE — 96372 THER/PROPH/DIAG INJ SC/IM: CPT | Performed by: NURSE PRACTITIONER

## 2024-10-21 PROCEDURE — 250N000011 HC RX IP 250 OP 636: Mod: JZ | Performed by: NURSE PRACTITIONER

## 2024-10-21 PROCEDURE — 250N000009 HC RX 250: Performed by: NURSE PRACTITIONER

## 2024-10-21 RX ORDER — MEPERIDINE HYDROCHLORIDE 25 MG/ML
25 INJECTION INTRAMUSCULAR; INTRAVENOUS; SUBCUTANEOUS EVERY 30 MIN PRN
OUTPATIENT
Start: 2024-11-18

## 2024-10-21 RX ORDER — ALBUTEROL SULFATE 0.83 MG/ML
2.5 SOLUTION RESPIRATORY (INHALATION)
OUTPATIENT
Start: 2024-11-18

## 2024-10-21 RX ORDER — ALBUTEROL SULFATE 90 UG/1
1-2 INHALANT RESPIRATORY (INHALATION)
Start: 2024-11-18

## 2024-10-21 RX ORDER — METHYLPREDNISOLONE SODIUM SUCCINATE 125 MG/2ML
125 INJECTION INTRAMUSCULAR; INTRAVENOUS
Start: 2024-11-18

## 2024-10-21 RX ORDER — LIDOCAINE HYDROCHLORIDE 10 MG/ML
2 INJECTION, SOLUTION EPIDURAL; INFILTRATION; INTRACAUDAL; PERINEURAL ONCE
Status: COMPLETED | OUTPATIENT
Start: 2024-10-21 | End: 2024-10-21

## 2024-10-21 RX ORDER — DIPHENHYDRAMINE HYDROCHLORIDE 50 MG/ML
50 INJECTION INTRAMUSCULAR; INTRAVENOUS
Start: 2024-11-18

## 2024-10-21 RX ORDER — EPINEPHRINE 1 MG/ML
0.3 INJECTION, SOLUTION, CONCENTRATE INTRAVENOUS EVERY 5 MIN PRN
OUTPATIENT
Start: 2024-11-18

## 2024-10-21 RX ORDER — LIDOCAINE HYDROCHLORIDE 10 MG/ML
2 INJECTION, SOLUTION EPIDURAL; INFILTRATION; INTRACAUDAL; PERINEURAL ONCE
OUTPATIENT
Start: 2024-11-18 | End: 2024-11-18

## 2024-10-21 RX ADMIN — BUPRENORPHINE 100 MG: 100 SOLUTION SUBCUTANEOUS at 11:41

## 2024-10-21 RX ADMIN — LIDOCAINE HYDROCHLORIDE 2 ML: 10 INJECTION, SOLUTION EPIDURAL; INFILTRATION; INTRACAUDAL; PERINEURAL at 11:38

## 2024-10-21 ASSESSMENT — PAIN SCALES - GENERAL: PAINLEVEL: NO PAIN (0)

## 2024-10-21 NOTE — PROGRESS NOTES
Infusion Nursing Note:  Troy Mccoy presents today for sublocade 100mg.    Patient seen by provider today: No   present during visit today: Not Applicable.    Note: Patient states he is doing well.  Denies relapse or withdrawal/cravings.  No evidence of tampering noted.      Intravenous Access:  No Intravenous access/labs at this visit.    Treatment Conditions:  Not Applicable.      Post Infusion Assessment:  Patient tolerated injection without incident.  Lidocaine given prior to sublocade.  Sublocade injection given in the same trajectory without difficulty.  LLQ      Discharge Plan:   Patient discharged in stable condition accompanied by: self.  Departure Mode: Ambulatory.  Will return to clinic in 4 weeks, appointment scheduled.    Chapis Chun RN

## 2024-11-18 ENCOUNTER — INFUSION THERAPY VISIT (OUTPATIENT)
Dept: INFUSION THERAPY | Facility: CLINIC | Age: 34
End: 2024-11-18
Attending: NURSE PRACTITIONER
Payer: COMMERCIAL

## 2024-11-18 VITALS
SYSTOLIC BLOOD PRESSURE: 106 MMHG | RESPIRATION RATE: 16 BRPM | HEART RATE: 52 BPM | DIASTOLIC BLOOD PRESSURE: 61 MMHG | TEMPERATURE: 97.2 F | OXYGEN SATURATION: 98 %

## 2024-11-18 DIAGNOSIS — F11.20 OPIOID USE DISORDER, SEVERE, DEPENDENCE (H): Primary | ICD-10-CM

## 2024-11-18 PROCEDURE — 96372 THER/PROPH/DIAG INJ SC/IM: CPT | Performed by: NURSE PRACTITIONER

## 2024-11-18 PROCEDURE — 250N000009 HC RX 250: Performed by: NURSE PRACTITIONER

## 2024-11-18 PROCEDURE — 250N000011 HC RX IP 250 OP 636: Mod: JZ | Performed by: NURSE PRACTITIONER

## 2024-11-18 RX ORDER — METHYLPREDNISOLONE SODIUM SUCCINATE 125 MG/2ML
125 INJECTION INTRAMUSCULAR; INTRAVENOUS
Start: 2024-12-16

## 2024-11-18 RX ORDER — LIDOCAINE HYDROCHLORIDE 10 MG/ML
2 INJECTION, SOLUTION EPIDURAL; INFILTRATION; INTRACAUDAL; PERINEURAL ONCE
Status: COMPLETED | OUTPATIENT
Start: 2024-11-18 | End: 2024-11-18

## 2024-11-18 RX ORDER — ALBUTEROL SULFATE 90 UG/1
1-2 INHALANT RESPIRATORY (INHALATION)
Start: 2024-12-16

## 2024-11-18 RX ORDER — DIPHENHYDRAMINE HYDROCHLORIDE 50 MG/ML
50 INJECTION INTRAMUSCULAR; INTRAVENOUS
Start: 2024-12-16

## 2024-11-18 RX ORDER — ALBUTEROL SULFATE 0.83 MG/ML
2.5 SOLUTION RESPIRATORY (INHALATION)
OUTPATIENT
Start: 2024-12-16

## 2024-11-18 RX ORDER — LIDOCAINE HYDROCHLORIDE 10 MG/ML
2 INJECTION, SOLUTION EPIDURAL; INFILTRATION; INTRACAUDAL; PERINEURAL ONCE
OUTPATIENT
Start: 2024-12-16 | End: 2024-12-16

## 2024-11-18 RX ORDER — MEPERIDINE HYDROCHLORIDE 25 MG/ML
25 INJECTION INTRAMUSCULAR; INTRAVENOUS; SUBCUTANEOUS EVERY 30 MIN PRN
OUTPATIENT
Start: 2024-12-16

## 2024-11-18 RX ORDER — EPINEPHRINE 1 MG/ML
0.3 INJECTION, SOLUTION, CONCENTRATE INTRAVENOUS EVERY 5 MIN PRN
OUTPATIENT
Start: 2024-12-16

## 2024-11-18 RX ADMIN — BUPRENORPHINE 100 MG: 100 SOLUTION SUBCUTANEOUS at 10:30

## 2024-11-18 RX ADMIN — LIDOCAINE HYDROCHLORIDE 2 ML: 10 INJECTION, SOLUTION EPIDURAL; INFILTRATION; INTRACAUDAL; PERINEURAL at 10:27

## 2024-11-18 ASSESSMENT — PAIN SCALES - GENERAL: PAINLEVEL_OUTOF10: NO PAIN (0)

## 2024-11-18 NOTE — PROGRESS NOTES
Infusion Nursing Note:  Troy Mccoy presents today for sublocade 100mg.    Patient seen by provider today: No   present during visit today: Not Applicable.    Note: Patient states he is doing well.  Denies relapse or withdrawal/cravings.  No evidence of tampering noted.      Intravenous Access:  No Intravenous access/labs at this visit.    Treatment Conditions:  Not Applicable.      Post Infusion Assessment:  Patient tolerated injection without incident.  Lidocaine given prior to sublocade. Sublocade injection given in the same trajectory without difficulty.  RLQ      Discharge Plan:   Patient discharged in stable condition accompanied by: self.  Departure Mode: Ambulatory.  Will return to clinic in 4 weeks, appointment scheduled.    Chapis Chun RN

## 2024-12-04 DIAGNOSIS — F41.9 ANXIETY: ICD-10-CM

## 2024-12-05 RX ORDER — CLONIDINE HYDROCHLORIDE 0.1 MG/1
0.1-0.2 TABLET ORAL 3 TIMES DAILY PRN
Qty: 180 TABLET | Refills: 2 | Status: SHIPPED | OUTPATIENT
Start: 2024-12-05

## 2024-12-05 NOTE — TELEPHONE ENCOUNTER
Date of Last Office Visit: 9/30/24  Date of Next Office Visit:  12/30/24  No shows since last visit: No  More than one patient-initiated cancellation (with reschedule) since last seen in clinic? No    []Medication refilled per  Medication Refill in Ambulatory Care  policy.  [x]Medication unable to be refilled by RN due to criteria not met as indicated below:    []Eligibility: has not had a provider visit within last 6 months   []Supervision: no future appointment; < 7 days before next appointment   []Compliance: no shows; cancellations; lapse in therapy   []Verification: order discrepancy; may need modification...   [] > 30-day supply request   [x]Advanced refill request: > 7 days before refill date   []Controlled medication   []Medication not included in policy   []Review: new med; med adjusted <= 30 days; safety alert; requires lab monitoring...   [x]Scope of Practice: refill request processed by LPN/MA   [x]Other: Should have enough supply until next visit      Medication(s) requested:     -  cloNIDine (CATAPRES) 0.1 MG tablet   Date last ordered: 9/30/24  Qty: 180  Refills: 0        Any Controlled Substance(s)? No      Requested medication(s) verified as identical to current order? Yes    Any lapse in adherence to medication(s) greater than 5 days? No      Additional action taken? routed encounter to provider for review.      Last visit treatment plan:   4. Anxiety  Anxiety managed with clonidine and gabapentin, continue  - gabapentin (NEURONTIN) 300 MG capsule; Take 3 capsules (900 mg) by mouth 3 times daily. May take an additional 900 mg as needed for anxiety  Dispense: 270 capsule; Refill: 2  - cloNIDine (CATAPRES) 0.1 MG tablet; Take 1-2 tablets (0.1-0.2 mg) by mouth 3 times daily as needed (anxiety).  Dispense: 180 tablet; Refill: 2  - mirtazapine (REMERON) 15 MG tablet; Take 1 tablet (15 mg) by mouth at bedtime.  Dispense: 30 tablet; Refill: 2    Any medication(s) require lab monitoring? Yes   ADDICTION  "MED   Last POC UDS Results:   Lab Results   Component Value Date    BUP Screen Positive (A) 09/30/2024    BZO Negative 09/30/2024    BAR Negative 09/30/2024    DELROY Negative 09/30/2024    MAMP Negative 09/30/2024    AMP Negative 09/30/2024    MDMA Negative 09/30/2024    MTD Negative 09/30/2024    QAW745 Negative 09/30/2024    OXY Negative 09/30/2024    PCP Negative 09/30/2024    THC Negative 09/30/2024    TEMP 90 F 09/30/2024    SGPOCT 1.015 09/30/2024         Last Drug Confirmation Results:   Creatinine Urine for Drug Screen   Date Value Ref Range Status   09/30/2024 97 mg/dL Final     Comment:     The reference range has not been established for creatinine in random urines. The results should be integrated into the clinical context for interpretation.     Gabapentin (Neurontin)   Date Value Ref Range Status   09/30/2024 Present (A) Absent Final     Comment:     Sources of gabapentin are prescription medications.       Last Drugs of Abuse Screening: No results found for: \"THC13\", \"PCP13\", \"COC13\", \"MAMP13\", \"OPI13\", \"AMP13\", \"BZO13\", \"TCA13\", \"MTD13\", \"BAR13\", \"OXY13\", \"PPX13\", \"BUP13\"     Last Fentanyl Qualitative Urine:   Fentanyl Qual Urine   Date Value Ref Range Status   12/04/2023 Screen Negative Screen Negative Final     Comment:     Cutoff for negative fentanyl is less than 5 ng/mL.        Last Ethyl Alcohol Level: No results found for: \"ETOH\"              "

## 2024-12-17 DIAGNOSIS — F41.9 ANXIETY: ICD-10-CM

## 2024-12-17 DIAGNOSIS — F10.21 ALCOHOL USE DISORDER, SEVERE, IN SUSTAINED REMISSION (H): ICD-10-CM

## 2024-12-17 DIAGNOSIS — F51.01 PRIMARY INSOMNIA: ICD-10-CM

## 2024-12-17 NOTE — TELEPHONE ENCOUNTER
Date of Last Office Visit: 9/30/24  Date of Next Office Visit:  12/30/24  No shows since last visit: No  More than one patient-initiated cancellation (with reschedule) since last seen in clinic? No    []Medication refilled per  Medication Refill in Ambulatory Care  policy.  [x]Medication unable to be refilled by RN due to criteria not met as indicated below:    []Eligibility: has not had a provider visit within last 6 months   []Supervision: no future appointment; < 7 days before next appointment   []Compliance: no shows; cancellations; lapse in therapy   []Verification: order discrepancy; may need modification...   [] > 30-day supply request   []Advanced refill request: > 7 days before refill date   []Controlled medication   [x]Medication not included in policy   []Review: new med; med adjusted <= 30 days; safety alert; requires lab monitoring...   []Scope of Practice: refill request processed by LPN/MA   []Other:      Medication(s) requested:     -  gabapentin (NEURONTIN) 300 MG capsule   Date last ordered: 9/30/24  Qty: 270  Refills: 2  Appropriate for refill? Provider to review.      -  mirtazapine (REMERON) 15 MG tablet   Date last ordered: 9/30/24  Qty: 30  Refills: 2  Appropriate for refill? Provider to review.      Any Controlled Substance(s)? Yes   [place]  checked? N/A      Requested medication(s) verified as identical to current order? Yes    Any lapse in adherence to medication(s) greater than 5 days? No      Additional action taken? cued up medication/order(s) and routed encounter to provider for review.      Last visit treatment plan:   1. Opioid use disorder, severe, in sustained remission (H)  Stable in sustained remission since 7/2/2023 on sublocade. Received sublocade #6 last week. Is reporting symptoms are controlled and does not need additional buprenorphine.   Continue monthly sublocade  Encouraged to complete LFts today.   Continue recovery supports     2. Encounter for monitoring opioid  maintenance therapy  - Drugs of Abuse Screen Urine (POC CUPS) POCT  - Drug Confirmation Panel Urine with Creat - lab collect; Future  - Drug Confirmation Panel Urine with Creat - lab collect     3. Alcohol use disorder, severe, in sustained remission (H)  Stable,  in remission since 7/2/2023/   Continue recovery supports  - gabapentin (NEURONTIN) 300 MG capsule; Take 3 capsules (900 mg) by mouth 3 times daily. May take an additional 900 mg as needed for anxiety  Dispense: 270 capsule; Refill: 2     4. Anxiety  Anxiety managed with clonidine and gabapentin, continue  - gabapentin (NEURONTIN) 300 MG capsule; Take 3 capsules (900 mg) by mouth 3 times daily. May take an additional 900 mg as needed for anxiety  Dispense: 270 capsule; Refill: 2  - cloNIDine (CATAPRES) 0.1 MG tablet; Take 1-2 tablets (0.1-0.2 mg) by mouth 3 times daily as needed (anxiety).  Dispense: 180 tablet; Refill: 2  - mirtazapine (REMERON) 15 MG tablet; Take 1 tablet (15 mg) by mouth at bedtime.  Dispense: 30 tablet; Refill: 2     5. Primary insomnia  Sleeping well with mirtazapine, continue  - mirtazapine (REMERON) 15 MG tablet; Take 1 tablet (15 mg) by mouth at bedtime.  Dispense: 30 tablet; Refill: 2     6. Therapeutic opioid-induced constipation (OIC)  Constipation managed with miralax  - polyethylene glycol (MIRALAX) 17 GM/Dose powder; Take 17 g (1 Capful) by mouth 2 times daily as needed for constipation.  Dispense: 578 g; Refill: 11  - Fiber 500 MG CAPS; Take 2 capsules by mouth daily.  Dispense: 60 capsule; Refill: 2       Any medication(s) require lab monitoring? No

## 2024-12-18 RX ORDER — MIRTAZAPINE 15 MG/1
15 TABLET, FILM COATED ORAL AT BEDTIME
Qty: 30 TABLET | Refills: 0 | Status: SHIPPED | OUTPATIENT
Start: 2024-12-18

## 2024-12-18 RX ORDER — GABAPENTIN 300 MG/1
CAPSULE ORAL
Qty: 27 CAPSULE | Refills: 0 | Status: SHIPPED | OUTPATIENT
Start: 2024-12-18

## 2024-12-18 NOTE — TELEPHONE ENCOUNTER
[facility] was contacted to verify availability of medication. Spoke to pharmacist, she indication that meds can be picked up, Mirtazapine on Friday 12/20 and Gabapentin 12/23 and that patient has been notified.   The pharmacy has Holiday hours and they are working on a way to notify pts of those hours.  This nurse left voice message for patient and directed him to contact pharmacy for specific  times.

## 2024-12-30 ENCOUNTER — OFFICE VISIT (OUTPATIENT)
Dept: ADDICTION MEDICINE | Facility: CLINIC | Age: 34
End: 2024-12-30
Payer: COMMERCIAL

## 2024-12-30 VITALS
HEIGHT: 75 IN | SYSTOLIC BLOOD PRESSURE: 156 MMHG | WEIGHT: 315 LBS | OXYGEN SATURATION: 98 % | HEART RATE: 84 BPM | DIASTOLIC BLOOD PRESSURE: 84 MMHG | BODY MASS INDEX: 39.17 KG/M2

## 2024-12-30 DIAGNOSIS — F11.93 OPIATE WITHDRAWAL (H): ICD-10-CM

## 2024-12-30 DIAGNOSIS — F41.9 ANXIETY: ICD-10-CM

## 2024-12-30 DIAGNOSIS — F11.20 OPIOID USE DISORDER, SEVERE, DEPENDENCE (H): Primary | ICD-10-CM

## 2024-12-30 DIAGNOSIS — F10.21 ALCOHOL USE DISORDER, SEVERE, IN SUSTAINED REMISSION (H): ICD-10-CM

## 2024-12-30 RX ORDER — METHOCARBAMOL 500 MG/1
500 TABLET, FILM COATED ORAL 4 TIMES DAILY PRN
Qty: 10 TABLET | Refills: 0 | Status: SHIPPED | OUTPATIENT
Start: 2024-12-30

## 2024-12-30 RX ORDER — GABAPENTIN 300 MG/1
900 CAPSULE ORAL 3 TIMES DAILY
Qty: 270 CAPSULE | Refills: 0 | Status: SHIPPED | OUTPATIENT
Start: 2024-12-30 | End: 2024-12-30

## 2024-12-30 RX ORDER — GABAPENTIN 300 MG/1
900 CAPSULE ORAL 3 TIMES DAILY
Qty: 270 CAPSULE | Refills: 2 | Status: SHIPPED | OUTPATIENT
Start: 2024-12-30

## 2024-12-30 RX ORDER — MULTIPLE VITAMINS W/ MINERALS TAB 9MG-400MCG
1 TAB ORAL DAILY
Qty: 30 TABLET | Refills: 11 | Status: SHIPPED | OUTPATIENT
Start: 2024-12-30

## 2024-12-30 RX ORDER — CLONIDINE HYDROCHLORIDE 0.1 MG/1
0.1-0.2 TABLET ORAL 3 TIMES DAILY PRN
Qty: 180 TABLET | Refills: 2 | Status: SHIPPED | OUTPATIENT
Start: 2024-12-30

## 2024-12-30 ASSESSMENT — ANXIETY QUESTIONNAIRES
5. BEING SO RESTLESS THAT IT IS HARD TO SIT STILL: NOT AT ALL
IF YOU CHECKED OFF ANY PROBLEMS ON THIS QUESTIONNAIRE, HOW DIFFICULT HAVE THESE PROBLEMS MADE IT FOR YOU TO DO YOUR WORK, TAKE CARE OF THINGS AT HOME, OR GET ALONG WITH OTHER PEOPLE: SOMEWHAT DIFFICULT
7. FEELING AFRAID AS IF SOMETHING AWFUL MIGHT HAPPEN: NOT AT ALL
7. FEELING AFRAID AS IF SOMETHING AWFUL MIGHT HAPPEN: NOT AT ALL
4. TROUBLE RELAXING: SEVERAL DAYS
GAD7 TOTAL SCORE: 5
6. BECOMING EASILY ANNOYED OR IRRITABLE: SEVERAL DAYS
1. FEELING NERVOUS, ANXIOUS, OR ON EDGE: SEVERAL DAYS
GAD7 TOTAL SCORE: 5
8. IF YOU CHECKED OFF ANY PROBLEMS, HOW DIFFICULT HAVE THESE MADE IT FOR YOU TO DO YOUR WORK, TAKE CARE OF THINGS AT HOME, OR GET ALONG WITH OTHER PEOPLE?: SOMEWHAT DIFFICULT
2. NOT BEING ABLE TO STOP OR CONTROL WORRYING: SEVERAL DAYS
3. WORRYING TOO MUCH ABOUT DIFFERENT THINGS: SEVERAL DAYS
GAD7 TOTAL SCORE: 5

## 2024-12-30 ASSESSMENT — PATIENT HEALTH QUESTIONNAIRE - PHQ9
10. IF YOU CHECKED OFF ANY PROBLEMS, HOW DIFFICULT HAVE THESE PROBLEMS MADE IT FOR YOU TO DO YOUR WORK, TAKE CARE OF THINGS AT HOME, OR GET ALONG WITH OTHER PEOPLE: SOMEWHAT DIFFICULT
SUM OF ALL RESPONSES TO PHQ QUESTIONS 1-9: 4
SUM OF ALL RESPONSES TO PHQ QUESTIONS 1-9: 4

## 2024-12-30 ASSESSMENT — PAIN SCALES - GENERAL: PAINLEVEL_OUTOF10: NO PAIN (0)

## 2024-12-30 NOTE — PROGRESS NOTES
[facility] Addiction Medicine    A/P                                                    ASSESSMENT/PLAN  1. Opioid use disorder, severe, dependence (H) (Primary  Stable in remission, reports last use remains 7/2023. Is s/p # 8 sublocade injections, last injection 11/18/2024. Has decided to stop sublocade and taper off of buprenorphine. Reporting muscle aches.leg cramps since missing his injection  Is still living in sober housing and attends meetings regularly.   Encouraged to resume buprenorphine in future if he begins to have urges, explaining that life stressors can trigger thoughts or urges to use.   Continue recovery supports.     2. Opiate withdrawal (H)  Is taking clonidine and gabapentin for anxiety and pain, providing Robaxin to help with leg cramping.   - methocarbamol (ROBAXIN) 500 MG tablet; Take 1 tablet (500 mg) by mouth 4 times daily as needed for muscle spasms.  Dispense: 10 tablet; Refill: 0    3. Alcohol use disorder, severe, in sustained remission (H)  Stable on remission.   Continue recovery supports.   - gabapentin (NEURONTIN) 300 MG capsule; Take 3 capsules (900 mg) by mouth 3 times daily.  Dispense: 270 capsule; Refill: 2  - multivitamin w/minerals (THERA-VIT-M) tablet; Take 1 tablet by mouth daily.  Dispense: 30 tablet; Refill: 11    4. Anxiety  Symptoms managed with clonidine and gabapentin, continue unchanged.   - cloNIDine (CATAPRES) 0.1 MG tablet; Take 1-2 tablets (0.1-0.2 mg) by mouth 3 times daily as needed.  Dispense: 180 tablet; Refill: 2  - gabapentin (NEURONTIN) 300 MG capsule; Take 3 capsules (900 mg) by mouth 3 times daily.  Dispense: 270 capsule; Refill: 2      Orders Placed This Encounter   Medications    DISCONTD: gabapentin (NEURONTIN) 300 MG capsule     Sig: Take 3 capsules (900 mg) by mouth 3 times daily.     Dispense:  270 capsule     Refill:  0    cloNIDine (CATAPRES) 0.1 MG tablet     Sig: Take 1-2 tablets (0.1-0.2 mg) by mouth 3 times daily as needed.      Dispense:  180 tablet     Refill:  2    gabapentin (NEURONTIN) 300 MG capsule     Sig: Take 3 capsules (900 mg) by mouth 3 times daily.     Dispense:  270 capsule     Refill:  2     Disregard prior RX    methocarbamol (ROBAXIN) 500 MG tablet     Sig: Take 1 tablet (500 mg) by mouth 4 times daily as needed for muscle spasms.     Dispense:  10 tablet     Refill:  0    multivitamin w/minerals (THERA-VIT-M) tablet     Sig: Take 1 tablet by mouth daily.     Dispense:  30 tablet     Refill:  11       Problem list updated Dec 30, 2024   No problems updated.          PDMP Review         Value Time User    State PDMP site checked  Yes 12/30/2024 11:17 AM Mandy Castillo CNP          12/23/2024 12/18/2024 1 Gabapentin 300 Mg Capsule 27.00 3 He Bat 20256 Equ (1193) 0/0  [place]   11/27/2024 09/30/2024 1 Gabapentin 300 Mg Capsule 270.00 30 He Bat 00303 Equ (1193) 2/2  [place]   10/30/2024 09/30/2024 1 Gabapentin 300 Mg Capsule 270.00 30 He Bat [id number] Equ (1193) 1/2  [place]   10/02/2024 09/30/2024 1 Gabapentin 300 Mg Capsule 270.00 30 He Bat [id number]            RT  No follow-ups on file.      Counseled the patient on the importance of having a recovery program in addition to medication to manage recovery.  Components include avoiding isolating, having willingness to change, avoiding triggers and managing cravings. Encouraged having some type of sober network and practicing honesty with trusted support person(s). Encouraged other services such as counseling, 12 step or other self-help organizations.      Opioid warning reviewed.  Risk of overdose following a period of abstinence due to decrease tolerance was discussed including risk of death.  Strongly recommended abstain from alcohol, benzodiazepines, THC, opioids and other drugs of abuse.  Increased risk of return to opioid use after use of these substances discussed.  Increased risk of overdose/death with use of other substances particularly  "benzodiazepines/alcohol reviewed.        SUBJECTIVE                                                    Troy Mccoy is a 33 year old male with PMH  HX of hepatitis C s/p treatment with [facility] 2022, seizures related to alcohol withdrawal, chronic LBP, anxiety,  alcohol use disorder, methamphetamine use disorder, and opioid use disorder on buprenorphine who presents for return visit.      Brief History:  [facility]cc[facility]. They were referred by staff at [facility] to assist pt in starting buprenorphine.  He has been using heroin/fentnayl 0.5-1 gram daily, last use was 7/2/23. He has history of daily IV meth use, last use 2018, and history of drinking 1 gallon of vodka daily until 2018 when he was hospitalized and in ICU for seizures. He maintained 1 year of abstinence, and returned to alcohol use. He began using fentanyl/heroin IV Summer 2022,  and decreased his alcohol use to \"few drinks\", couple times per month. He entered residential treatment at [facility], was started on Suboxone at initial visit.   Returned to  9/12/23 after graduating from [facility].  Had been prescribed buprenorphine by Elías Read NP while in [facility], unable to continue after graduation due to distance to [place] for in person visits.  Started OP with [facility] 9/2023.   10/10/23 buprenorphine increased to 32mg TDD.   [facility]  Sublocade initiated 5/6/2024 5/6/24 #1 300 mg  6/3/24 #2 300 mg  7/1/2024 # 3 300 mg  7/29/2024 #4 100 mg  8/26/2024 #5 100 mg  9/23/2024 #6 100 mg  #7  10/21/24 100 MG  #8 11/18/2024 100 MG            Substance Use History :  Opioids:   Age at first use: 14-15 years.   Current use: substance: fentanyl, heroin; quantity 0.5-1 gram daily route: inhaled and IV; timing of last use: approx week of 7/2/2023 ;                 IV drug use: Yes:   History of overdose: Yes: 12 times  Previous residential or " outpatient treatments for addiction : Yes: [facility] 7/12/23  Previous medication treatments for addiction: No  Longest period of sobriety: 1 year, 2018  Medical complications related to substance use: no  Hepatitis C: Received treatment with [facility] 2022; 7/20/23 HCV RNA not detected  HIV: 7/20/23 HIV ag/ab nonreactive     Other Addiction History:  Stimulants   Meth IV, hx of daily use before 2018. Last use was in 2018  Sedatives/hypnotics/anxiolytics:   BDZ occasional  Alcohol:   Yes, HX of drinking 1 gallon a day of vodka until 2018. Since then drinking few drinks couple times monthly.   Nicotine:   vaping  Cannabis:   Yes, smokes couple of hits here and there.   Hallucinogens/Dissociatives:   Yes, none recently  Eating disorder:  no  Gambling:              no        PAST PSYCHIATRIC HISTORY:  Diagnoses- anxiety  Suicide Attempts: Yes  approx 2021  Hospitalizations: Yes 2021     Social History  Housing status: sober house  Employment status: Unemployed, not seeking work  Relationship status: Single  Children: no children  Legal: none  Insurance needs: active  Contact information up to date? yes       Recent HPI Details: 9/30/24  1. Opioid use disorder, severe, in sustained remission (H)  Stable in sustained remission since 7/2/2023 on sublocade. Received sublocade #6 last week. Is reporting symptoms are controlled and does not need additional buprenorphine.   Continue monthly sublocade  Encouraged to complete LFts today.   Continue recovery supports     2. Encounter for monitoring opioid maintenance therapy  - Drugs of Abuse Screen Urine (POC CUPS) POCT  - Drug Confirmation Panel Urine with Creat - lab collect; Future  - Drug Confirmation Panel Urine with Creat - lab collect     3. Alcohol use disorder, severe, in sustained remission (H)  Stable,  in remission since 7/2/2023/   Continue recovery supports  - gabapentin (NEURONTIN) 300 MG capsule; Take 3 capsules (900 mg) by mouth 3 times daily. May take an  "additional 900 mg as needed for anxiety  Dispense: 270 capsule; Refill: 2     4. Anxiety  Anxiety managed with clonidine and gabapentin, continue  - gabapentin (NEURONTIN) 300 MG capsule; Take 3 capsules (900 mg) by mouth 3 times daily. May take an additional 900 mg as needed for anxiety  Dispense: 270 capsule; Refill: 2  - cloNIDine (CATAPRES) 0.1 MG tablet; Take 1-2 tablets (0.1-0.2 mg) by mouth 3 times daily as needed (anxiety).  Dispense: 180 tablet; Refill: 2  - mirtazapine (REMERON) 15 MG tablet; Take 1 tablet (15 mg) by mouth at bedtime.  Dispense: 30 tablet; Refill: 2     5. Primary insomnia  Sleeping well with mirtazapine, continue  - mirtazapine (REMERON) 15 MG tablet; Take 1 tablet (15 mg) by mouth at bedtime.  Dispense: 30 tablet; Refill: 2     6. Therapeutic opioid-induced constipation (OIC)  Constipation managed with miralax  - polyethylene glycol (MIRALAX) 17 GM/Dose powder; Take 17 g (1 Capful) by mouth 2 times daily as needed for constipation.  Dispense: 578 g; Refill: 11  - Fiber 500 MG CAPS; Take 2 capsules by mouth daily.  Dispense: 60 capsule; Refill: 2    TODAY'S VISIT  HPI Dec 30, 2024    Planning to taper off of buprenorphine. Received his last sublocade 11/18/2024.   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   Needs refill of gabapentin and clonidine today.             5/13/2024     1:31 PM 9/30/2024     9:37 AM 12/30/2024    10:19 AM   PHQ   PHQ-9 Total Score 7 3 4    Q9: Thoughts of better off dead/self-harm past 2 weeks Not at all  Not at all Not at all       Patient-reported    Proxy-reported       OBJECTIVE                                                    PHYSICAL EXAM:  BP (!) 156/84 (BP Location: Right arm, Patient Position: Sitting, Cuff Size: Adult Large)   Pulse 84   Ht 1.905 m (6' 3\")   Wt 142.9 kg (315 lb)   SpO2 98%   BMI 39.37 kg/m        Physical Exam  Cardiovascular:      Rate and Rhythm: Normal " rate.   Pulmonary:      Effort: Pulmonary effort is normal. No respiratory distress.   Skin:     Coloration: Skin is not jaundiced.   Neurological:      General: No focal deficit present.      Mental Status: He is alert and oriented to person, place, and time.   Psychiatric:         Attention and Perception: Attention normal.         Mood and Affect: Mood normal.         Speech: Speech normal.         Behavior: Behavior is cooperative.         Thought Content: Thought content normal.         Judgment: Judgment normal.         LAB      HISTORY                                                    Problem list reviewed & adjusted, as indicated.  Patient Active Problem List   Diagnosis    Opioid use disorder, severe, dependence (H)         MEDICATION LIST (prior to visit)  Current Outpatient Medications   Medication Sig Dispense Refill    cloNIDine (CATAPRES) 0.1 MG tablet Take 1-2 tablets (0.1-0.2 mg) by mouth 3 times daily as needed. 180 tablet 2    gabapentin (NEURONTIN) 300 MG capsule Take 3 capsules (900 mg) by mouth 3 times daily. 270 capsule 2    methocarbamol (ROBAXIN) 500 MG tablet Take 1 tablet (500 mg) by mouth 4 times daily as needed for muscle spasms. 10 tablet 0    mirtazapine (REMERON) 15 MG tablet TAKE 1 TABLET BY MOUTH AT BEDTIME 30 tablet 0    multivitamin w/minerals (THERA-VIT-M) tablet Take 1 tablet by mouth daily. 30 tablet 11    polyethylene glycol (MIRALAX) 17 GM/Dose powder Take 17 g (1 Capful) by mouth 2 times daily as needed for constipation. 578 g 11    Buprenorphine HCl-Naloxone HCl (SUBOXONE) 12-3 MG FILM per film Place 1 Film under the tongue daily as needed (withdrawal cravings). (Patient not taking: Reported on 9/23/2024) 30 Film 0    buprenorphine HCl-naloxone HCl (SUBOXONE) 8-2 MG per film Place 1 Film under the tongue daily (Patient not taking: Reported on 9/30/2024) 30 Film 1    Fiber 500 MG CAPS Take 2 capsules by mouth daily. 60 capsule 2    naloxone (NARCAN) 4 MG/0.1ML nasal spray  Spray 1 spray (4 mg) into one nostril alternating nostrils as needed for opioid reversal every 2-3 minutes until assistance arrives (Patient not taking: Reported on 5/6/2024) 0.2 mL 11     No current facility-administered medications for this visit.       MEDICATION LIST (after visit)  Current Outpatient Medications   Medication Sig Dispense Refill    cloNIDine (CATAPRES) 0.1 MG tablet Take 1-2 tablets (0.1-0.2 mg) by mouth 3 times daily as needed. 180 tablet 2    gabapentin (NEURONTIN) 300 MG capsule Take 3 capsules (900 mg) by mouth 3 times daily. 270 capsule 2    methocarbamol (ROBAXIN) 500 MG tablet Take 1 tablet (500 mg) by mouth 4 times daily as needed for muscle spasms. 10 tablet 0    mirtazapine (REMERON) 15 MG tablet TAKE 1 TABLET BY MOUTH AT BEDTIME 30 tablet 0    multivitamin w/minerals (THERA-VIT-M) tablet Take 1 tablet by mouth daily. 30 tablet 11    polyethylene glycol (MIRALAX) 17 GM/Dose powder Take 17 g (1 Capful) by mouth 2 times daily as needed for constipation. 578 g 11    Buprenorphine HCl-Naloxone HCl (SUBOXONE) 12-3 MG FILM per film Place 1 Film under the tongue daily as needed (withdrawal cravings). (Patient not taking: Reported on 9/23/2024) 30 Film 0    buprenorphine HCl-naloxone HCl (SUBOXONE) 8-2 MG per film Place 1 Film under the tongue daily (Patient not taking: Reported on 9/30/2024) 30 Film 1    Fiber 500 MG CAPS Take 2 capsules by mouth daily. 60 capsule 2    naloxone (NARCAN) 4 MG/0.1ML nasal spray Spray 1 spray (4 mg) into one nostril alternating nostrils as needed for opioid reversal every 2-3 minutes until assistance arrives (Patient not taking: Reported on 5/6/2024) 0.2 mL 11     No current facility-administered medications for this visit.         Allergies   Allergen Reactions    Cephalosporins Hives    Cefaclor Rash        Continued Complex Management  The longitudinal plan of care for Opioid Use Disorder (OUD) and Alcohol Use Disorder (AUD) was addressed during this  visit. Due to the added complexity in care, I will continue to support Troy in the subsequent management and with ongoing continuity of care.        Mandy Castillo DNP,MSN, AGNP-C  [facility]   [address] , [address]   [place] [address]   Phone # [phone]

## 2024-12-30 NOTE — PROGRESS NOTES
[facility] Addiction Medicine       Rooming information:   Pt arrived for the appointment late, thought is was virtual.   Accidentally missed his last Sublocade appt, says he is doing well    Point of care urine drug screen positive for: Unable to urinate    Lab Results   Component Value Date    BUP Screen Positive (A) 09/30/2024    BZO Negative 09/30/2024    BAR Negative 09/30/2024    DELROY Negative 09/30/2024    MAMP Negative 09/30/2024    AMP Negative 09/30/2024    MDMA Negative 09/30/2024    MTD Negative 09/30/2024    DWS608 Negative 09/30/2024    OXY Negative 09/30/2024    PCP Negative 09/30/2024    THC Negative 09/30/2024    TEMP 90 F 09/30/2024    SGPOCT 1.015 09/30/2024       *POC urine drug screen does not screen for Fentanyl    PHQ-9 Scores:       5/13/2024     1:31 PM 9/30/2024     9:37 AM 12/30/2024    10:19 AM   PHQ   PHQ-9 Total Score 7 3 4    Q9: Thoughts of better off dead/self-harm past 2 weeks Not at all  Not at all Not at all       Patient-reported    Proxy-reported     ZENIA-7 Scores:      5/13/2024     1:31 PM 9/30/2024     9:38 AM 12/30/2024    10:20 AM   ZENIA-7 SCORE   Total Score 10 (moderate anxiety) 3 (minimal anxiety) 5 (mild anxiety)   Total Score 10 3 5        Patient-reported       Any other recent substance use:     NA    NICOTINE-NA  If using nicotine, ready to quit? NA    Side effects related to medications pt would like to discuss with provider (constipation, dry mouth, HA, GI upset, sedation?) N/A     Narcan currently available: N/A    Primary care provider: [facility] provider: NA (follow up on MH referral if needed)    Any housing, insurance deficits?: NA    Contact information up to date? NA    3[employer] Involvement NA (please obtain LILIAN if pt would like to include)        Juliana Caraballo LPN  December 30, 2024  11:18 AM

## 2025-03-19 ENCOUNTER — TELEPHONE (OUTPATIENT)
Dept: ADDICTION MEDICINE | Facility: CLINIC | Age: 35
End: 2025-03-19
Payer: COMMERCIAL

## 2025-03-19 NOTE — TELEPHONE ENCOUNTER
Sublocade therapy plan discontinued after multiple attempts by infusion center to contact patient were unsuccessful.

## 2025-03-19 NOTE — TELEPHONE ENCOUNTER
Attempts to call patient to discuss plan of care regarding sublocade, last injection was 11/18/2024. Left VM to call back

## 2025-04-16 ENCOUNTER — MYC REFILL (OUTPATIENT)
Dept: ADDICTION MEDICINE | Facility: CLINIC | Age: 35
End: 2025-04-16
Payer: COMMERCIAL

## 2025-04-16 DIAGNOSIS — F41.9 ANXIETY: ICD-10-CM

## 2025-04-16 DIAGNOSIS — F10.21 ALCOHOL USE DISORDER, SEVERE, IN SUSTAINED REMISSION (H): ICD-10-CM

## 2025-04-17 RX ORDER — GABAPENTIN 300 MG/1
900 CAPSULE ORAL 3 TIMES DAILY
Qty: 270 CAPSULE | Refills: 0 | Status: SHIPPED | OUTPATIENT
Start: 2025-04-17

## 2025-04-17 RX ORDER — CLONIDINE HYDROCHLORIDE 0.1 MG/1
0.1-0.2 TABLET ORAL 3 TIMES DAILY PRN
Qty: 180 TABLET | Refills: 0 | Status: SHIPPED | OUTPATIENT
Start: 2025-04-17

## 2025-04-17 NOTE — TELEPHONE ENCOUNTER
Date of Last Office Visit: 12/30/24  Date of Next Office Visit: None; routing for A to assist pt with scheduling.  Still doesn't have insurance.   No shows since last visit: No  More than one patient-initiated cancellation (with reschedule) since last seen in clinic? No    []Medication refilled per  Medication Refill in Ambulatory Care  policy.  []Scope of Practice: refill request processed by LPN/MA  [x]Medication unable to be refilled by RN due to criteria not met as indicated below:    []Eligibility: has not had a provider visit within last 6 months   [x]Supervision: no future appointment; < 7 days before next appointment   []Compliance: no shows; cancellations; lapse in therapy   []Verification: order discrepancy; may need modification...   [] > 30-day supply request   []Advanced refill request: > 7 days before refill date   []Controlled medication   []Medication not included in policy   []Review: new med; med adjusted <= 30 days; safety alert; requires lab monitoring...   []Other:      Medication(s) requested:     -  cloNIDine (CATAPRES) 0.1 MG tablet   Date last ordered: 3/21/25  Qty: 180  Refills: 0  Appropriate for refill? Yes    -  gabapentin (NEURONTIN) 300 MG capsule   Date last ordered: 3/21/25  Qty: 270  Refills: 0  Appropriate for refill? Yes    Any Controlled Substance(s)? Yes   [place]      Requested medication(s) verified as identical to current order? Yes    Any lapse in adherence to medication(s) greater than 5 days? No      Additional action taken? cued up medication/order(s).      Last visit treatment plan:   ASSESSMENT/PLAN  1. Opioid use disorder, severe, dependence (H) (Primary  Stable in remission, reports last use remains 7/2023. Is s/p # 8 sublocade injections, last injection 11/18/2024. Has decided to stop sublocade and taper off of buprenorphine. Reporting muscle aches.leg cramps since missing his injection  Is still living in sober housing and attends meetings regularly.    Encouraged to resume buprenorphine in future if he begins to have urges, explaining that life stressors can trigger thoughts or urges to use.   Continue recovery supports.      2. Opiate withdrawal (H)  Is taking clonidine and gabapentin for anxiety and pain, providing Robaxin to help with leg cramping.   - methocarbamol (ROBAXIN) 500 MG tablet; Take 1 tablet (500 mg) by mouth 4 times daily as needed for muscle spasms.  Dispense: 10 tablet; Refill: 0     3. Alcohol use disorder, severe, in sustained remission (H)  Stable on remission.   Continue recovery supports.   - gabapentin (NEURONTIN) 300 MG capsule; Take 3 capsules (900 mg) by mouth 3 times daily.  Dispense: 270 capsule; Refill: 2  - multivitamin w/minerals (THERA-VIT-M) tablet; Take 1 tablet by mouth daily.  Dispense: 30 tablet; Refill: 11     4. Anxiety  Symptoms managed with clonidine and gabapentin, continue unchanged.   - cloNIDine (CATAPRES) 0.1 MG tablet; Take 1-2 tablets (0.1-0.2 mg) by mouth 3 times daily as needed.  Dispense: 180 tablet; Refill: 2  - gabapentin (NEURONTIN) 300 MG capsule; Take 3 capsules (900 mg) by mouth 3 times daily.  Dispense: 270 capsule; Refill: 2    Any medication(s) require lab monitoring? No    Mayuri Horner RN on 4/17/2025 at 8:52 AM

## 2025-04-19 ENCOUNTER — HEALTH MAINTENANCE LETTER (OUTPATIENT)
Age: 35
End: 2025-04-19

## 2025-05-14 ENCOUNTER — MYC REFILL (OUTPATIENT)
Dept: ADDICTION MEDICINE | Facility: CLINIC | Age: 35
End: 2025-05-14
Payer: COMMERCIAL

## 2025-05-14 DIAGNOSIS — F10.21 ALCOHOL USE DISORDER, SEVERE, IN SUSTAINED REMISSION (H): ICD-10-CM

## 2025-05-14 DIAGNOSIS — F41.9 ANXIETY: ICD-10-CM

## 2025-05-15 RX ORDER — GABAPENTIN 300 MG/1
900 CAPSULE ORAL 3 TIMES DAILY
Qty: 270 CAPSULE | Refills: 0 | Status: SHIPPED | OUTPATIENT
Start: 2025-05-15

## 2025-05-15 RX ORDER — CLONIDINE HYDROCHLORIDE 0.1 MG/1
0.1-0.2 TABLET ORAL 3 TIMES DAILY PRN
Qty: 180 TABLET | Refills: 0 | Status: SHIPPED | OUTPATIENT
Start: 2025-05-15

## 2025-05-15 NOTE — TELEPHONE ENCOUNTER
Date of Last Office Visit: 12/30/24  Date of Next Office Visit: None; routing for A to assist pt with scheduling.    No shows since last visit: No  More than one patient-initiated cancellation (with reschedule) since last seen in clinic? No    []Medication refilled per  Medication Refill in Ambulatory Care  policy.  []Scope of Practice: refill request processed by LPN/MA  [x]Medication unable to be refilled by RN due to criteria not met as indicated below:    []Eligibility: has not had a provider visit within last 6 months   [x]Supervision: no future appointment; < 7 days before next appointment   []Compliance: no shows; cancellations; lapse in therapy   []Verification: order discrepancy; may need modification...   [] > 30-day supply request   []Advanced refill request: > 7 days before refill date   []Controlled medication   []Medication not included in policy   []Review: new med; med adjusted <= 30 days; safety alert; requires lab monitoring...   []Other:      Medication(s) requested:     -  cloNIDine (CATAPRES) 0.1 MG tablet   Date last ordered: 4/17/25  Qty: 180  Refills: 0  Appropriate for refill? Yes    -  gabapentin (NEURONTIN) 300 MG capsule   Date last ordered: 4/17/25  Qty: 270  Refills: 0  Appropriate for refill? Yes    Any Controlled Substance(s)? Yes   [place]      Requested medication(s) verified as identical to current order? Yes    Any lapse in adherence to medication(s) greater than 5 days? No      Additional action taken? cued up medication/order(s) and routed encounter to provider for review.      Last visit treatment plan:   Stable in remission, reports last use remains 7/2023. Is s/p # 8 sublocade injections, last injection 11/18/2024. Has decided to stop sublocade and taper off of buprenorphine. Reporting muscle aches.leg cramps since missing his injection  Is still living in sober housing and attends meetings regularly.   Encouraged to resume buprenorphine in future if he begins  to have urges, explaining that life stressors can trigger thoughts or urges to use.   Continue recovery supports.      2. Opiate withdrawal (H)  Is taking clonidine and gabapentin for anxiety and pain, providing Robaxin to help with leg cramping.   - methocarbamol (ROBAXIN) 500 MG tablet; Take 1 tablet (500 mg) by mouth 4 times daily as needed for muscle spasms.  Dispense: 10 tablet; Refill: 0     3. Alcohol use disorder, severe, in sustained remission (H)  Stable on remission.   Continue recovery supports.   - gabapentin (NEURONTIN) 300 MG capsule; Take 3 capsules (900 mg) by mouth 3 times daily.  Dispense: 270 capsule; Refill: 2  - multivitamin w/minerals (THERA-VIT-M) tablet; Take 1 tablet by mouth daily.  Dispense: 30 tablet; Refill: 11     4. Anxiety  Symptoms managed with clonidine and gabapentin, continue unchanged.   - cloNIDine (CATAPRES) 0.1 MG tablet; Take 1-2 tablets (0.1-0.2 mg) by mouth 3 times daily as needed.  Dispense: 180 tablet; Refill: 2  - gabapentin (NEURONTIN) 300 MG capsule; Take 3 capsules (900 mg) by mouth 3 times daily.  Dispense: 270 capsule; Refill: 2  Planning to taper off of buprenorphine. Received his last sublocade 11/18/2024.   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   Needs refill of gabapentin and clonidine today.     Any medication(s) require lab monitoring? No    Mayuri Horner RN on 5/15/2025 at 11:58 AM

## 2025-06-11 ENCOUNTER — MYC REFILL (OUTPATIENT)
Dept: ADDICTION MEDICINE | Facility: CLINIC | Age: 35
End: 2025-06-11
Payer: COMMERCIAL

## 2025-06-11 DIAGNOSIS — F41.9 ANXIETY: ICD-10-CM

## 2025-06-11 DIAGNOSIS — F10.21 ALCOHOL USE DISORDER, SEVERE, IN SUSTAINED REMISSION (H): ICD-10-CM

## 2025-06-11 RX ORDER — CLONIDINE HYDROCHLORIDE 0.1 MG/1
0.1-0.2 TABLET ORAL 3 TIMES DAILY PRN
Qty: 180 TABLET | Refills: 0 | Status: SHIPPED | OUTPATIENT
Start: 2025-06-11

## 2025-06-11 RX ORDER — GABAPENTIN 300 MG/1
900 CAPSULE ORAL 3 TIMES DAILY
Qty: 270 CAPSULE | Refills: 0 | Status: SHIPPED | OUTPATIENT
Start: 2025-06-11

## 2025-06-11 NOTE — TELEPHONE ENCOUNTER
Patient sent MyC reply regarding insurance. RN sent reply requesting patient schedule.    Heidy Negron RN on 6/11/2025 at 11:07 AM

## 2025-06-11 NOTE — TELEPHONE ENCOUNTER
Patient was without insurance. States it should be active 6/1/25. RN sent MyC message to confirm.    Date of Last Office Visit: 12/30/2024  Date of Next Office Visit: None; routing for A to assist pt with scheduling.    No shows since last visit: No  More than one patient-initiated cancellation (with reschedule) since last seen in clinic? No - One on 3/24/25    [x]Medication unable to be refilled by RN due to criteria not met as indicated below:      [x]Controlled medication    Medication(s) requested:   gabapentin (NEURONTIN) 300 MG capsule 270 capsule 0 5/15/2025 -- No   Sig - Route: Take 3 capsules (900 mg) by mouth 3 times daily. - Oral     cloNIDine (CATAPRES) 0.1 MG tablet 180 tablet 0 5/15/2025 -- No   Sig - Route: Take 1-2 tablets (0.1-0.2 mg) by mouth 3 times daily as needed. - Oral     Last visit treatment plan:   ASSESSMENT/PLAN  1. Opioid use disorder, severe, dependence (H) (Primary  Stable in remission, reports last use remains 7/2023. Is s/p # 8 sublocade injections, last injection 11/18/2024. Has decided to stop sublocade and taper off of buprenorphine. Reporting muscle aches.leg cramps since missing his injection  Is still living in sober housing and attends meetings regularly.   Encouraged to resume buprenorphine in future if he begins to have urges, explaining that life stressors can trigger thoughts or urges to use.   Continue recovery supports.      2. Opiate withdrawal (H)  Is taking clonidine and gabapentin for anxiety and pain, providing Robaxin to help with leg cramping.   - methocarbamol (ROBAXIN) 500 MG tablet; Take 1 tablet (500 mg) by mouth 4 times daily as needed for muscle spasms.  Dispense: 10 tablet; Refill: 0     3. Alcohol use disorder, severe, in sustained remission (H)  Stable on remission.   Continue recovery supports.   - gabapentin (NEURONTIN) 300 MG capsule; Take 3 capsules (900 mg) by mouth 3 times daily.  Dispense: 270 capsule; Refill: 2  - multivitamin w/minerals  (THERA-VIT-M) tablet; Take 1 tablet by mouth daily.  Dispense: 30 tablet; Refill: 11     4. Anxiety  Symptoms managed with clonidine and gabapentin, continue unchanged.   - cloNIDine (CATAPRES) 0.1 MG tablet; Take 1-2 tablets (0.1-0.2 mg) by mouth 3 times daily as needed.  Dispense: 180 tablet; Refill: 2  - gabapentin (NEURONTIN) 300 MG capsule; Take 3 capsules (900 mg) by mouth 3 times daily.  Dispense: 270 capsule; Refill: 2     ASSESSMENT/PLAN  1. Opioid use disorder, severe, dependence (H) (Primary  Stable in remission, reports last use remains 7/2023. Is s/p # 8 sublocade injections, last injection 11/18/2024. Has decided to stop sublocade and taper off of buprenorphine. Reporting muscle aches.leg cramps since missing his injection  Is still living in sober housing and attends meetings regularly.   Encouraged to resume buprenorphine in future if he begins to have urges, explaining that life stressors can trigger thoughts or urges to use.   Continue recovery supports.      2. Opiate withdrawal (H)  Is taking clonidine and gabapentin for anxiety and pain, providing Robaxin to help with leg cramping.   - methocarbamol (ROBAXIN) 500 MG tablet; Take 1 tablet (500 mg) by mouth 4 times daily as needed for muscle spasms.  Dispense: 10 tablet; Refill: 0     3. Alcohol use disorder, severe, in sustained remission (H)  Stable on remission.   Continue recovery supports.   - gabapentin (NEURONTIN) 300 MG capsule; Take 3 capsules (900 mg) by mouth 3 times daily.  Dispense: 270 capsule; Refill: 2  - multivitamin w/minerals (THERA-VIT-M) tablet; Take 1 tablet by mouth daily.  Dispense: 30 tablet; Refill: 11     4. Anxiety  Symptoms managed with clonidine and gabapentin, continue unchanged.   - cloNIDine (CATAPRES) 0.1 MG tablet; Take 1-2 tablets (0.1-0.2 mg) by mouth 3 times daily as needed.  Dispense: 180 tablet; Refill: 2  - gabapentin (NEURONTIN) 300 MG capsule; Take 3 capsules (900 mg) by mouth 3 times daily.  Dispense:  "270 capsule; Refill: 2     TODAY'S VISIT  HPI Dec 30, 2024     Planning to taper off of buprenorphine. Received his last sublocade 11/18/2024.   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   Needs refill of gabapentin and clonidine today.     Any medication(s) require lab monitoring? Yes   ADDICTION MED   Last POC UDS Results:   Lab Results   Component Value Date    BUP Screen Positive (A) 09/30/2024    BZO Negative 09/30/2024    BAR Negative 09/30/2024    DELROY Negative 09/30/2024    MAMP Negative 09/30/2024    AMP Negative 09/30/2024    MDMA Negative 09/30/2024    MTD Negative 09/30/2024    VEY751 Negative 09/30/2024    OXY Negative 09/30/2024    PCP Negative 09/30/2024    THC Negative 09/30/2024    TEMP 90 F 09/30/2024    SGPOCT 1.015 09/30/2024         Last Drug Confirmation Results:   Creatinine Urine for Drug Screen   Date Value Ref Range Status   09/30/2024 97 mg/dL Final     Comment:     The reference range has not been established for creatinine in random urines. The results should be integrated into the clinical context for interpretation.     Gabapentin (Neurontin)   Date Value Ref Range Status   09/30/2024 Present (A) Absent Final     Comment:     Sources of gabapentin are prescription medications.       Last Drugs of Abuse Screening: No results found for: \"THC13\", \"PCP13\", \"COC13\", \"MAMP13\", \"OPI13\", \"AMP13\", \"BZO13\", \"TCA13\", \"MTD13\", \"BAR13\", \"OXY13\", \"PPX13\", \"BUP13\"     Last Fentanyl Qualitative Urine:   Fentanyl Qual Urine   Date Value Ref Range Status   12/04/2023 Screen Negative Screen Negative Final     Comment:     Cutoff for negative fentanyl is less than 5 ng/mL.        Last Ethyl Alcohol Level: No results found for: \"ETOH\"    Heidy Negron RN on 6/11/2025 at 10:41 AM                 "

## 2025-06-16 ENCOUNTER — OFFICE VISIT (OUTPATIENT)
Dept: ADDICTION MEDICINE | Facility: CLINIC | Age: 35
End: 2025-06-16
Payer: COMMERCIAL

## 2025-06-16 VITALS
OXYGEN SATURATION: 98 % | DIASTOLIC BLOOD PRESSURE: 63 MMHG | HEART RATE: 42 BPM | BODY MASS INDEX: 39.17 KG/M2 | WEIGHT: 315 LBS | HEIGHT: 75 IN | SYSTOLIC BLOOD PRESSURE: 113 MMHG

## 2025-06-16 DIAGNOSIS — F10.21 ALCOHOL USE DISORDER, SEVERE, IN SUSTAINED REMISSION (H): ICD-10-CM

## 2025-06-16 DIAGNOSIS — F41.9 ANXIETY: ICD-10-CM

## 2025-06-16 DIAGNOSIS — Z51.81 ENCOUNTER FOR MONITORING OPIOID MAINTENANCE THERAPY: Primary | ICD-10-CM

## 2025-06-16 DIAGNOSIS — F11.21 OPIOID USE DISORDER, SEVERE, IN SUSTAINED REMISSION (H): ICD-10-CM

## 2025-06-16 DIAGNOSIS — Z79.891 ENCOUNTER FOR MONITORING OPIOID MAINTENANCE THERAPY: Primary | ICD-10-CM

## 2025-06-16 PROCEDURE — G2211 COMPLEX E/M VISIT ADD ON: HCPCS | Performed by: NURSE PRACTITIONER

## 2025-06-16 PROCEDURE — 3078F DIAST BP <80 MM HG: CPT | Performed by: NURSE PRACTITIONER

## 2025-06-16 PROCEDURE — 3074F SYST BP LT 130 MM HG: CPT | Performed by: NURSE PRACTITIONER

## 2025-06-16 PROCEDURE — 99214 OFFICE O/P EST MOD 30 MIN: CPT | Performed by: NURSE PRACTITIONER

## 2025-06-16 PROCEDURE — 1126F AMNT PAIN NOTED NONE PRSNT: CPT | Performed by: NURSE PRACTITIONER

## 2025-06-16 RX ORDER — CLONIDINE HYDROCHLORIDE 0.1 MG/1
0.1 TABLET ORAL 3 TIMES DAILY PRN
Qty: 90 TABLET | Refills: 2 | Status: SHIPPED | OUTPATIENT
Start: 2025-06-16

## 2025-06-16 RX ORDER — GABAPENTIN 300 MG/1
900 CAPSULE ORAL 3 TIMES DAILY
Qty: 270 CAPSULE | Refills: 2 | Status: SHIPPED | OUTPATIENT
Start: 2025-06-16

## 2025-06-16 ASSESSMENT — PATIENT HEALTH QUESTIONNAIRE - PHQ9
SUM OF ALL RESPONSES TO PHQ QUESTIONS 1-9: 1
SUM OF ALL RESPONSES TO PHQ QUESTIONS 1-9: 1
10. IF YOU CHECKED OFF ANY PROBLEMS, HOW DIFFICULT HAVE THESE PROBLEMS MADE IT FOR YOU TO DO YOUR WORK, TAKE CARE OF THINGS AT HOME, OR GET ALONG WITH OTHER PEOPLE: NOT DIFFICULT AT ALL

## 2025-06-16 ASSESSMENT — ANXIETY QUESTIONNAIRES
IF YOU CHECKED OFF ANY PROBLEMS ON THIS QUESTIONNAIRE, HOW DIFFICULT HAVE THESE PROBLEMS MADE IT FOR YOU TO DO YOUR WORK, TAKE CARE OF THINGS AT HOME, OR GET ALONG WITH OTHER PEOPLE: NOT DIFFICULT AT ALL
3. WORRYING TOO MUCH ABOUT DIFFERENT THINGS: NOT AT ALL
1. FEELING NERVOUS, ANXIOUS, OR ON EDGE: NOT AT ALL
7. FEELING AFRAID AS IF SOMETHING AWFUL MIGHT HAPPEN: NOT AT ALL
7. FEELING AFRAID AS IF SOMETHING AWFUL MIGHT HAPPEN: NOT AT ALL
6. BECOMING EASILY ANNOYED OR IRRITABLE: SEVERAL DAYS
2. NOT BEING ABLE TO STOP OR CONTROL WORRYING: NOT AT ALL
8. IF YOU CHECKED OFF ANY PROBLEMS, HOW DIFFICULT HAVE THESE MADE IT FOR YOU TO DO YOUR WORK, TAKE CARE OF THINGS AT HOME, OR GET ALONG WITH OTHER PEOPLE?: NOT DIFFICULT AT ALL
GAD7 TOTAL SCORE: 1
4. TROUBLE RELAXING: NOT AT ALL
GAD7 TOTAL SCORE: 1
5. BEING SO RESTLESS THAT IT IS HARD TO SIT STILL: NOT AT ALL
GAD7 TOTAL SCORE: 1

## 2025-06-16 ASSESSMENT — PAIN SCALES - GENERAL: PAINLEVEL_OUTOF10: NO PAIN (0)

## 2025-06-16 NOTE — PROGRESS NOTES
[facility] Addiction Medicine    A/P                                                    ASSESSMENT/PLAN    1. Opioid use disorder, severe, in sustained remission (H)  Stable in sustained remission since 7/2023. Has successfully tapered of off buprenorphine with sublocade. Last injection 11/2024. Experienced intermittent mild withdrawal symptoms that were very manageable.   Remains actively engaged in his recovery by attending meetings weekly. Is still living in sober living, is now the .     2. Alcohol use disorder, severe, in sustained remission (H)  Stable in sustained remission since 7/2023  Continue recovery supports.   - gabapentin (NEURONTIN) 300 MG capsule; Take 3 capsules (900 mg) by mouth 3 times daily.  Dispense: 270 capsule; Refill: 2    3. Encounter for monitoring opioid maintenance therapy (Primary)  - Drugs of Abuse Screen Urine (POC CUPS) POCT; Standing  - Fentanyl and Metabolite Quantitative, Urine; Future  - Drugs of Abuse Screen Urine (POC CUPS) POCT    4. Anxiety  Anxiety managed with clonidine and gabapentin. Is taking 0.2 mg of clonidine TID and presents today with bradycardia, asymptomatic. Discussed adding selective serotonin reuptake inhibitor to treat anxiety, patient declined.   Prefers to continue clonidine and gabapentin. Reducing dose of clonidine to 0.1 mg TID prn, encouraged to take as needed versus scheduled.   Encouraged to avoid clonidine if HR <60.   Continue gabapentin 900 mg Tid unchanged.   Pllacing referral for psychiatry  - Adult Mental Health  Referral; Future  - cloNIDine (CATAPRES) 0.1 MG tablet; Take 1 tablet (0.1 mg) by mouth 3 times daily as needed.  Dispense: 90 tablet; Refill: 2  - gabapentin (NEURONTIN) 300 MG capsule; Take 3 capsules (900 mg) by mouth 3 times daily.  Dispense: 270 capsule; Refill: 2      Orders Placed This Encounter   Medications    cloNIDine (CATAPRES) 0.1 MG tablet     Sig: Take 1 tablet (0.1 mg) by mouth 3 times  daily as needed.     Dispense:  90 tablet     Refill:  2    gabapentin (NEURONTIN) 300 MG capsule     Sig: Take 3 capsules (900 mg) by mouth 3 times daily.     Dispense:  270 capsule     Refill:  2       Problem list updated Dec 30, 2024   No problems updated.          PDMP Review         Value Time User    State PDMP site checked  Yes 6/16/2025  9:56 AM Mandy Castillo CNP              RTC  Return in about 13 weeks (around 9/15/2025) for Follow up, using a video visit 0930.      Counseled the patient on the importance of having a recovery program in addition to medication to manage recovery.  Components include avoiding isolating, having willingness to change, avoiding triggers and managing cravings. Encouraged having some type of sober network and practicing honesty with trusted support person(s). Encouraged other services such as counseling, 12 step or other self-help organizations.      Opioid warning reviewed.  Risk of overdose following a period of abstinence due to decrease tolerance was discussed including risk of death.  Strongly recommended abstain from alcohol, benzodiazepines, THC, opioids and other drugs of abuse.  Increased risk of return to opioid use after use of these substances discussed.  Increased risk of overdose/death with use of other substances particularly benzodiazepines/alcohol reviewed.        SUBJECTIVE                                                    Troy Mccoy is a 33 year old male with PMH  HX of hepatitis C s/p treatment with [facility] 2022, seizures related to alcohol withdrawal, chronic LBP, anxiety,  alcohol use disorder, methamphetamine use disorder, and opioid use disorder on buprenorphine who presents for return visit.      Brief History:  [facility]cc[facility]. They were referred by staff at [facility] to assist pt in starting buprenorphine.  He has been using heroin/fentnayl 0.5-1 gram daily, last use was 7/2/23. He has  "history of daily IV meth use, last use 2018, and history of drinking 1 gallon of vodka daily until 2018 when he was hospitalized and in ICU for seizures. He maintained 1 year of abstinence, and returned to alcohol use. He began using fentanyl/heroin IV Summer 2022,  and decreased his alcohol use to \"few drinks\", couple times per month. He entered residential treatment at [facility], was started on Suboxone at initial visit.   Returned to  9/12/23 after graduating from [facility].  Had been prescribed buprenorphine by Elías Read NP while in [facility], unable to continue after graduation due to distance to [facility] for in person visits.  Started OP with [facility] 9/2023.   10/10/23 buprenorphine increased to 32mg TDD.   [facility]  Sublocade initiated 5/6/2024 5/6/24 #1 300 mg  6/3/24 #2 300 mg  7/1/2024 # 3 300 mg  7/29/2024 #4 100 mg  8/26/2024 #5 100 mg  9/23/2024 #6 100 mg  #7  10/21/24 100 MG  #8 11/18/2024 100 MG            Substance Use History :  Opioids:   Age at first use: 14-15 years.   Current use: substance: fentanyl, heroin; quantity 0.5-1 gram daily route: inhaled and IV; timing of last use: approx week of 7/2/2023 ;                 IV drug use: Yes:   History of overdose: Yes: 12 times  Previous residential or outpatient treatments for addiction : Yes: [facility] 7/12/23  Previous medication treatments for addiction: No  Longest period of sobriety: 1 year, 2018  Medical complications related to substance use: no  Hepatitis C: Received treatment with [facility] 2022; 7/20/23 HCV RNA not detected  HIV: 7/20/23 HIV ag/ab nonreactive     Other Addiction History:  Stimulants   Meth IV, hx of daily use before 2018. Last use was in 2018  Sedatives/hypnotics/anxiolytics:   BDZ occasional  Alcohol:   Yes, HX of drinking 1 gallon a day of vodka until 2018. Since then drinking few drinks couple times monthly.   Nicotine:   vaping  Cannabis:   Yes, " smokes couple of hits here and there.   Hallucinogens/Dissociatives:   Yes, none recently  Eating disorder:  no  Gambling:              no        PAST PSYCHIATRIC HISTORY:  Diagnoses- anxiety  Suicide Attempts: Yes  approx 2021  Hospitalizations: Yes 2021     Social History  Housing status: sober house  Employment status: Unemployed, not seeking work  Relationship status: Single  Children: no children  Legal: none  Insurance needs: active  Contact information up to date? yes     TODAY'S VISIT  HPI Jun 16, 2025    Still sober. S/p # 8 sublocade 11/18/2024. Has successfully tapered off of buprenorphine. Is still active in his recovery. Is now the sober . Still attending meetings.   Is taking clonidine for his anxiety, started at  for withdrawal and has been taking since HR 42, Repeat 46.   Denies episodes pf Dizziness, lightheadedness. Just woke up and took his medications before his appointment.   Has severe social anxiety and clonidine helps. Is taking 0.2 mg TID.   Is not interested in selective serotonin reuptake inhibitor or other antidepressants to treat symptoms.       Recent HPI Details: 12/30/2024  1. Opioid use disorder, severe, dependence (H) (Primary  Stable in remission, reports last use remains 7/2023. Is s/p # 8 sublocade injections, last injection 11/18/2024. Has decided to stop sublocade and taper off of buprenorphine. Reporting muscle aches.leg cramps since missing his injection  Is still living in sober housing and attends meetings regularly.   Encouraged to resume buprenorphine in future if he begins to have urges, explaining that life stressors can trigger thoughts or urges to use.   Continue recovery supports.     2. Opiate withdrawal (H)  Is taking clonidine and gabapentin for anxiety and pain, providing Robaxin to help with leg cramping.   - methocarbamol (ROBAXIN) 500 MG tablet; Take 1 tablet (500 mg) by mouth 4 times daily as needed for muscle spasms.  Dispense: 10 tablet;  "Refill: 0    3. Alcohol use disorder, severe, in sustained remission (H)  Stable on remission.   Continue recovery supports.   - gabapentin (NEURONTIN) 300 MG capsule; Take 3 capsules (900 mg) by mouth 3 times daily.  Dispense: 270 capsule; Refill: 2  - multivitamin w/minerals (THERA-VIT-M) tablet; Take 1 tablet by mouth daily.  Dispense: 30 tablet; Refill: 11    4. Anxiety  Symptoms managed with clonidine and gabapentin, continue unchanged.   - cloNIDine (CATAPRES) 0.1 MG tablet; Take 1-2 tablets (0.1-0.2 mg) by mouth 3 times daily as needed.  Dispense: 180 tablet; Refill: 2  - gabapentin (NEURONTIN) 300 MG capsule; Take 3 capsules (900 mg) by mouth 3 times daily.  Dispense: 270 capsule; Refill: 2        9/30/2024     9:37 AM 12/30/2024    10:19 AM 6/16/2025     9:28 AM   PHQ   PHQ-9 Total Score 3 4  1    Q9: Thoughts of better off dead/self-harm past 2 weeks Not at all Not at all Not at all       Patient-reported       OBJECTIVE                                                    PHYSICAL EXAM:  /63 (BP Location: Right arm, Patient Position: Standing, Cuff Size: Adult Large)   Pulse (!) 42   Ht 1.905 m (6' 3\")   Wt (!) 152 kg (335 lb)   SpO2 98%   BMI 41.87 kg/m        Physical Exam  Cardiovascular:      Rate and Rhythm: Normal rate.   Pulmonary:      Effort: Pulmonary effort is normal. No respiratory distress.   Skin:     Coloration: Skin is not jaundiced.   Neurological:      General: No focal deficit present.      Mental Status: He is alert and oriented to person, place, and time.   Psychiatric:         Attention and Perception: Attention normal.         Mood and Affect: Mood normal.         Speech: Speech normal.         Behavior: Behavior is cooperative.         Thought Content: Thought content normal.         Judgment: Judgment normal.         LAB      HISTORY                                                    Problem list reviewed & adjusted, as indicated.  Patient Active Problem List   Diagnosis "    Opioid use disorder, severe, dependence (H)         MEDICATION LIST (prior to visit)  Current Outpatient Medications   Medication Sig Dispense Refill    cloNIDine (CATAPRES) 0.1 MG tablet Take 1 tablet (0.1 mg) by mouth 3 times daily as needed. 90 tablet 2    gabapentin (NEURONTIN) 300 MG capsule Take 3 capsules (900 mg) by mouth 3 times daily. 270 capsule 2    naloxone (NARCAN) 4 MG/0.1ML nasal spray Spray 1 spray (4 mg) into one nostril alternating nostrils as needed for opioid reversal every 2-3 minutes until assistance arrives 0.2 mL 11    polyethylene glycol (MIRALAX) 17 GM/Dose powder Take 17 g (1 Capful) by mouth 2 times daily as needed for constipation. 578 g 11     No current facility-administered medications for this visit.       MEDICATION LIST (after visit)  Current Outpatient Medications   Medication Sig Dispense Refill    cloNIDine (CATAPRES) 0.1 MG tablet Take 1 tablet (0.1 mg) by mouth 3 times daily as needed. 90 tablet 2    gabapentin (NEURONTIN) 300 MG capsule Take 3 capsules (900 mg) by mouth 3 times daily. 270 capsule 2    naloxone (NARCAN) 4 MG/0.1ML nasal spray Spray 1 spray (4 mg) into one nostril alternating nostrils as needed for opioid reversal every 2-3 minutes until assistance arrives 0.2 mL 11    polyethylene glycol (MIRALAX) 17 GM/Dose powder Take 17 g (1 Capful) by mouth 2 times daily as needed for constipation. 578 g 11     No current facility-administered medications for this visit.         Allergies   Allergen Reactions    Cephalosporins Hives    Cefaclor Rash        Continued Complex Management  The longitudinal plan of care for Opioid Use Disorder (OUD) and Alcohol Use Disorder (AUD) was addressed during this visit. Due to the added complexity in care, I will continue to support Troy in the subsequent management and with ongoing continuity of care.        Mandy Castillo, DNP,MSN, AGNP-C  [facility]   [address], [address]   [place] [address]   Phone # [phone]

## 2025-06-16 NOTE — PROGRESS NOTES
[facility] - Addiction Medicine       Rooming information: Pt is off of Sublocade and Suboxone, doing well. He is here just for recheck.  Pulse is low, provider notified    Point of care urine drug screen positive for: Declined for now, said he just went.    Lab Results   Component Value Date    BUP Screen Positive (A) 09/30/2024    BZO Negative 09/30/2024    BAR Negative 09/30/2024    DELROY Negative 09/30/2024    MAMP Negative 09/30/2024    AMP Negative 09/30/2024    MDMA Negative 09/30/2024    MTD Negative 09/30/2024    DWV238 Negative 09/30/2024    OXY Negative 09/30/2024    PCP Negative 09/30/2024    THC Negative 09/30/2024    TEMP 90 F 09/30/2024    SGPOCT 1.015 09/30/2024       *POC urine drug screen does not screen for Fentanyl    PHQ-9 Scores:       9/30/2024     9:37 AM 12/30/2024    10:19 AM 6/16/2025     9:28 AM   PHQ   PHQ-9 Total Score 3 4  1    Q9: Thoughts of better off dead/self-harm past 2 weeks Not at all Not at all Not at all       Patient-reported     ZENIA-7 Scores:      9/30/2024     9:38 AM 12/30/2024    10:20 AM 6/16/2025     9:29 AM   ZENIA-7 SCORE   Total Score 3 (minimal anxiety) 5 (mild anxiety) 1 (minimal anxiety)   Total Score 3 5  1        Patient-reported       Any other recent substance use:     Denies    NICOTINE- vape  If using nicotine, ready to quit? No    Side effects related to medications pt would like to discuss with provider (constipation, dry mouth, HA, GI upset, sedation?)  NA    Narcan currently available: Yes    Primary care provider: [facility] provider: none (follow up on MH referral if needed)    Any housing, insurance deficits?: got his insurance back     Contact information up to date? yes    [employer] NA (please obtain LILIAN if pt would like to include)      Juliana Caraballo LPN  June 16, 2025  9:34 AM

## 2025-06-17 ENCOUNTER — PATIENT OUTREACH (OUTPATIENT)
Dept: CARE COORDINATION | Facility: CLINIC | Age: 35
End: 2025-06-17
Payer: COMMERCIAL

## 2025-06-19 ENCOUNTER — PATIENT OUTREACH (OUTPATIENT)
Dept: CARE COORDINATION | Facility: CLINIC | Age: 35
End: 2025-06-19
Payer: COMMERCIAL

## 2025-09-03 ENCOUNTER — TELEPHONE (OUTPATIENT)
Dept: ADDICTION MEDICINE | Facility: CLINIC | Age: 35
End: 2025-09-03
Payer: COMMERCIAL

## (undated) RX ORDER — LIDOCAINE HYDROCHLORIDE 10 MG/ML
INJECTION, SOLUTION EPIDURAL; INFILTRATION; INTRACAUDAL; PERINEURAL
Status: DISPENSED
Start: 2024-10-21

## (undated) RX ORDER — LIDOCAINE HYDROCHLORIDE 10 MG/ML
INJECTION, SOLUTION EPIDURAL; INFILTRATION; INTRACAUDAL; PERINEURAL
Status: DISPENSED
Start: 2024-09-23

## (undated) RX ORDER — LIDOCAINE HYDROCHLORIDE 10 MG/ML
INJECTION, SOLUTION EPIDURAL; INFILTRATION; INTRACAUDAL; PERINEURAL
Status: DISPENSED
Start: 2024-06-03

## (undated) RX ORDER — LIDOCAINE HYDROCHLORIDE 10 MG/ML
INJECTION, SOLUTION EPIDURAL; INFILTRATION; INTRACAUDAL; PERINEURAL
Status: DISPENSED
Start: 2024-11-18

## (undated) RX ORDER — LIDOCAINE HYDROCHLORIDE 10 MG/ML
INJECTION, SOLUTION EPIDURAL; INFILTRATION; INTRACAUDAL; PERINEURAL
Status: DISPENSED
Start: 2024-07-29

## (undated) RX ORDER — LIDOCAINE HYDROCHLORIDE 10 MG/ML
INJECTION, SOLUTION EPIDURAL; INFILTRATION; INTRACAUDAL; PERINEURAL
Status: DISPENSED
Start: 2024-05-06

## (undated) RX ORDER — LIDOCAINE HYDROCHLORIDE 10 MG/ML
INJECTION, SOLUTION EPIDURAL; INFILTRATION; INTRACAUDAL; PERINEURAL
Status: DISPENSED
Start: 2024-07-01

## (undated) RX ORDER — LIDOCAINE HYDROCHLORIDE 10 MG/ML
INJECTION, SOLUTION EPIDURAL; INFILTRATION; INTRACAUDAL; PERINEURAL
Status: DISPENSED
Start: 2024-08-26